# Patient Record
Sex: FEMALE | Race: WHITE | Employment: OTHER | URBAN - METROPOLITAN AREA
[De-identification: names, ages, dates, MRNs, and addresses within clinical notes are randomized per-mention and may not be internally consistent; named-entity substitution may affect disease eponyms.]

---

## 2017-11-15 LAB
AVERAGE GLUCOSE: NORMAL
HBA1C MFR BLD: 5.3 %

## 2018-04-25 ENCOUNTER — ANESTHESIA EVENT (OUTPATIENT)
Dept: OPERATING ROOM | Age: 83
DRG: 470 | End: 2018-04-25
Payer: MEDICARE

## 2018-04-25 ENCOUNTER — HOSPITAL ENCOUNTER (OUTPATIENT)
Dept: PREADMISSION TESTING | Age: 83
Discharge: HOME | End: 2018-04-25
Payer: MEDICARE

## 2018-04-25 ENCOUNTER — HOSPITAL ENCOUNTER (OUTPATIENT)
Dept: GENERAL RADIOLOGY | Age: 83
Discharge: HOME | End: 2018-04-27
Payer: MEDICARE

## 2018-04-25 VITALS
TEMPERATURE: 97.7 F | OXYGEN SATURATION: 97 % | HEIGHT: 63 IN | DIASTOLIC BLOOD PRESSURE: 75 MMHG | HEART RATE: 62 BPM | WEIGHT: 154 LBS | SYSTOLIC BLOOD PRESSURE: 177 MMHG | BODY MASS INDEX: 27.29 KG/M2 | RESPIRATION RATE: 16 BRPM

## 2018-04-25 DIAGNOSIS — M17.11 ARTHRITIS OF RIGHT KNEE: ICD-10-CM

## 2018-04-25 PROCEDURE — 71046 X-RAY EXAM CHEST 2 VIEWS: CPT

## 2018-04-25 RX ORDER — HYDROCODONE BITARTRATE AND ACETAMINOPHEN 5; 325 MG/1; MG/1
1 TABLET ORAL EVERY 8 HOURS PRN
Status: ON HOLD | COMMUNITY
End: 2018-05-03 | Stop reason: HOSPADM

## 2018-04-25 RX ORDER — PYRIDOXINE HCL (VITAMIN B6) 50 MG
50 TABLET ORAL DAILY
COMMUNITY
End: 2020-12-29

## 2018-04-25 RX ORDER — ROPIVACAINE HYDROCHLORIDE 5 MG/ML
30 INJECTION, SOLUTION EPIDURAL; INFILTRATION; PERINEURAL ONCE
Status: CANCELLED | OUTPATIENT
Start: 2018-04-25 | End: 2018-04-25

## 2018-04-25 RX ORDER — CELECOXIB 100 MG/1
200 CAPSULE ORAL ONCE
Status: CANCELLED | OUTPATIENT
Start: 2018-04-25 | End: 2018-04-25

## 2018-04-25 RX ORDER — LEVOTHYROXINE SODIUM 0.03 MG/1
25 TABLET ORAL DAILY
COMMUNITY
End: 2019-06-18 | Stop reason: SDUPTHER

## 2018-04-25 RX ORDER — FENTANYL CITRATE 50 UG/ML
50 INJECTION, SOLUTION INTRAMUSCULAR; INTRAVENOUS ONCE
Status: CANCELLED | OUTPATIENT
Start: 2018-04-25 | End: 2018-04-25

## 2018-04-25 RX ORDER — CHOLECALCIFEROL (VITAMIN D3) 125 MCG
500 CAPSULE ORAL DAILY
COMMUNITY
End: 2021-06-29 | Stop reason: ALTCHOICE

## 2018-04-25 RX ORDER — GABAPENTIN 100 MG/1
200 CAPSULE ORAL ONCE
Status: CANCELLED | OUTPATIENT
Start: 2018-04-25 | End: 2018-04-25

## 2018-04-25 RX ORDER — MIDAZOLAM HYDROCHLORIDE 1 MG/ML
1 INJECTION INTRAMUSCULAR; INTRAVENOUS EVERY 5 MIN PRN
Status: CANCELLED | OUTPATIENT
Start: 2018-04-25

## 2018-04-25 RX ORDER — ACETAMINOPHEN 500 MG
1000 TABLET ORAL ONCE
Status: CANCELLED | OUTPATIENT
Start: 2018-04-25 | End: 2018-04-25

## 2018-04-25 RX ORDER — OXYCODONE HYDROCHLORIDE 5 MG/1
5 TABLET ORAL ONCE
Status: CANCELLED | OUTPATIENT
Start: 2018-04-25 | End: 2018-04-25

## 2018-04-25 ASSESSMENT — PAIN DESCRIPTION - LOCATION: LOCATION: KNEE

## 2018-04-25 ASSESSMENT — PAIN DESCRIPTION - PAIN TYPE: TYPE: CHRONIC PAIN

## 2018-04-25 ASSESSMENT — KOOS JR
GOING UP OR DOWN STAIRS: 4
TWISING OR PIVOTING ON KNEE: 3
STANDING UPRIGHT: 3
BENDING TO THE FLOOR TO PICK UP OBJECT: 2
HOW SEVERE IS YOUR KNEE STIFFNESS AFTER FIRST WAKING IN MORNING: 2
STRAIGHTENING KNEE FULLY: 3
RISING FROM SITTING: 2

## 2018-04-25 ASSESSMENT — PAIN DESCRIPTION - ONSET: ONSET: ON-GOING

## 2018-04-25 ASSESSMENT — PAIN DESCRIPTION - DESCRIPTORS: DESCRIPTORS: DISCOMFORT

## 2018-04-25 ASSESSMENT — PAIN DESCRIPTION - ORIENTATION: ORIENTATION: RIGHT

## 2018-04-25 ASSESSMENT — PAIN DESCRIPTION - PROGRESSION: CLINICAL_PROGRESSION: GRADUALLY WORSENING

## 2018-04-25 ASSESSMENT — PAIN SCALES - GENERAL: PAINLEVEL_OUTOF10: 9

## 2018-04-25 ASSESSMENT — PAIN DESCRIPTION - FREQUENCY: FREQUENCY: CONTINUOUS

## 2018-05-01 ENCOUNTER — APPOINTMENT (OUTPATIENT)
Dept: GENERAL RADIOLOGY | Age: 83
DRG: 470 | End: 2018-05-01
Attending: ORTHOPAEDIC SURGERY
Payer: MEDICARE

## 2018-05-01 ENCOUNTER — HOSPITAL ENCOUNTER (INPATIENT)
Age: 83
LOS: 2 days | Discharge: NURSING, REHAB OR LONG-TERM CARE | DRG: 470 | End: 2018-05-03
Attending: ORTHOPAEDIC SURGERY | Admitting: ORTHOPAEDIC SURGERY
Payer: MEDICARE

## 2018-05-01 ENCOUNTER — ANESTHESIA (OUTPATIENT)
Dept: OPERATING ROOM | Age: 83
DRG: 470 | End: 2018-05-01
Payer: MEDICARE

## 2018-05-01 VITALS — SYSTOLIC BLOOD PRESSURE: 95 MMHG | DIASTOLIC BLOOD PRESSURE: 49 MMHG | TEMPERATURE: 93.7 F | OXYGEN SATURATION: 99 %

## 2018-05-01 DIAGNOSIS — M17.11 ARTHRITIS OF RIGHT KNEE: Primary | ICD-10-CM

## 2018-05-01 PROCEDURE — 6360000002 HC RX W HCPCS: Performed by: PHYSICIAN ASSISTANT

## 2018-05-01 PROCEDURE — 88311 DECALCIFY TISSUE: CPT

## 2018-05-01 PROCEDURE — 6370000000 HC RX 637 (ALT 250 FOR IP): Performed by: INTERNAL MEDICINE

## 2018-05-01 PROCEDURE — 7100000000 HC PACU RECOVERY - FIRST 15 MIN: Performed by: ORTHOPAEDIC SURGERY

## 2018-05-01 PROCEDURE — 2500000003 HC RX 250 WO HCPCS: Performed by: PHYSICIAN ASSISTANT

## 2018-05-01 PROCEDURE — 6370000000 HC RX 637 (ALT 250 FOR IP): Performed by: ANESTHESIOLOGY

## 2018-05-01 PROCEDURE — 3600000015 HC SURGERY LEVEL 5 ADDTL 15MIN: Performed by: ORTHOPAEDIC SURGERY

## 2018-05-01 PROCEDURE — 1200000000 HC SEMI PRIVATE

## 2018-05-01 PROCEDURE — 3600000005 HC SURGERY LEVEL 5 BASE: Performed by: ORTHOPAEDIC SURGERY

## 2018-05-01 PROCEDURE — 97165 OT EVAL LOW COMPLEX 30 MIN: CPT

## 2018-05-01 PROCEDURE — 73560 X-RAY EXAM OF KNEE 1 OR 2: CPT

## 2018-05-01 PROCEDURE — 97161 PT EVAL LOW COMPLEX 20 MIN: CPT

## 2018-05-01 PROCEDURE — G8987 SELF CARE CURRENT STATUS: HCPCS

## 2018-05-01 PROCEDURE — 3E0T3BZ INTRODUCTION OF ANESTHETIC AGENT INTO PERIPHERAL NERVES AND PLEXI, PERCUTANEOUS APPROACH: ICD-10-PCS | Performed by: ANESTHESIOLOGY

## 2018-05-01 PROCEDURE — 76942 ECHO GUIDE FOR BIOPSY: CPT | Performed by: ANESTHESIOLOGY

## 2018-05-01 PROCEDURE — 3700000001 HC ADD 15 MINUTES (ANESTHESIA): Performed by: ORTHOPAEDIC SURGERY

## 2018-05-01 PROCEDURE — G8988 SELF CARE GOAL STATUS: HCPCS

## 2018-05-01 PROCEDURE — 2500000003 HC RX 250 WO HCPCS: Performed by: NURSE ANESTHETIST, CERTIFIED REGISTERED

## 2018-05-01 PROCEDURE — C1713 ANCHOR/SCREW BN/BN,TIS/BN: HCPCS | Performed by: ORTHOPAEDIC SURGERY

## 2018-05-01 PROCEDURE — 6360000002 HC RX W HCPCS: Performed by: ORTHOPAEDIC SURGERY

## 2018-05-01 PROCEDURE — 2500000003 HC RX 250 WO HCPCS: Performed by: ORTHOPAEDIC SURGERY

## 2018-05-01 PROCEDURE — 0SRC0J9 REPLACEMENT OF RIGHT KNEE JOINT WITH SYNTHETIC SUBSTITUTE, CEMENTED, OPEN APPROACH: ICD-10-PCS | Performed by: ORTHOPAEDIC SURGERY

## 2018-05-01 PROCEDURE — 88305 TISSUE EXAM BY PATHOLOGIST: CPT

## 2018-05-01 PROCEDURE — 2500000003 HC RX 250 WO HCPCS: Performed by: ANESTHESIOLOGY

## 2018-05-01 PROCEDURE — 3700000000 HC ANESTHESIA ATTENDED CARE: Performed by: ORTHOPAEDIC SURGERY

## 2018-05-01 PROCEDURE — 6360000002 HC RX W HCPCS: Performed by: NURSE ANESTHETIST, CERTIFIED REGISTERED

## 2018-05-01 PROCEDURE — 2580000003 HC RX 258: Performed by: PHYSICIAN ASSISTANT

## 2018-05-01 PROCEDURE — 2720000010 HC SURG SUPPLY STERILE: Performed by: ORTHOPAEDIC SURGERY

## 2018-05-01 PROCEDURE — C1776 JOINT DEVICE (IMPLANTABLE): HCPCS | Performed by: ORTHOPAEDIC SURGERY

## 2018-05-01 PROCEDURE — 7100000001 HC PACU RECOVERY - ADDTL 15 MIN: Performed by: ORTHOPAEDIC SURGERY

## 2018-05-01 PROCEDURE — 2709999900 HC NON-CHARGEABLE SUPPLY: Performed by: ORTHOPAEDIC SURGERY

## 2018-05-01 PROCEDURE — 6360000002 HC RX W HCPCS: Performed by: ANESTHESIOLOGY

## 2018-05-01 PROCEDURE — 6370000000 HC RX 637 (ALT 250 FOR IP): Performed by: PHYSICIAN ASSISTANT

## 2018-05-01 PROCEDURE — 2580000003 HC RX 258: Performed by: NURSE ANESTHETIST, CERTIFIED REGISTERED

## 2018-05-01 DEVICE — BASEPLATE TIB SZ 3 KNEE TRITANIUM CEM PRI LO PROF TRIATHLON: Type: IMPLANTABLE DEVICE | Site: KNEE | Status: FUNCTIONAL

## 2018-05-01 DEVICE — COMPONENT PAT DIA29MM THK9MM SUP INFERIOR KNEE CONVENTIONAL: Type: IMPLANTABLE DEVICE | Site: KNEE | Status: FUNCTIONAL

## 2018-05-01 DEVICE — IMPLANTABLE DEVICE: Type: IMPLANTABLE DEVICE | Site: KNEE | Status: FUNCTIONAL

## 2018-05-01 DEVICE — CEMENT BNE 40 GM RADIOPAQUE BA SIMPLEX P: Type: IMPLANTABLE DEVICE | Status: FUNCTIONAL

## 2018-05-01 DEVICE — COMPONENT PATELLAR KNEE N2VAC: Type: IMPLANTABLE DEVICE | Site: KNEE | Status: FUNCTIONAL

## 2018-05-01 DEVICE — COMPONENT TOT KNEE CAPPED STD STRYKNEES] STRYKER CORP]: Type: IMPLANTABLE DEVICE | Site: KNEE | Status: FUNCTIONAL

## 2018-05-01 RX ORDER — VERAPAMIL HYDROCHLORIDE 240 MG/1
240 TABLET, FILM COATED, EXTENDED RELEASE ORAL DAILY
Status: DISCONTINUED | OUTPATIENT
Start: 2018-05-01 | End: 2018-05-03 | Stop reason: HOSPADM

## 2018-05-01 RX ORDER — ROPIVACAINE HYDROCHLORIDE 5 MG/ML
30 INJECTION, SOLUTION EPIDURAL; INFILTRATION; PERINEURAL ONCE
Status: COMPLETED | OUTPATIENT
Start: 2018-05-01 | End: 2018-05-01

## 2018-05-01 RX ORDER — LIDOCAINE HYDROCHLORIDE 10 MG/ML
INJECTION, SOLUTION INFILTRATION; PERINEURAL
Status: DISCONTINUED
Start: 2018-05-01 | End: 2018-05-01

## 2018-05-01 RX ORDER — SODIUM CHLORIDE 0.9 % (FLUSH) 0.9 %
10 SYRINGE (ML) INJECTION EVERY 12 HOURS SCHEDULED
Status: DISCONTINUED | OUTPATIENT
Start: 2018-05-01 | End: 2018-05-01 | Stop reason: HOSPADM

## 2018-05-01 RX ORDER — OXYCODONE HYDROCHLORIDE 5 MG/1
TABLET ORAL
Status: DISPENSED
Start: 2018-05-01 | End: 2018-05-01

## 2018-05-01 RX ORDER — PROPOFOL 10 MG/ML
INJECTION, EMULSION INTRAVENOUS CONTINUOUS PRN
Status: DISCONTINUED | OUTPATIENT
Start: 2018-05-01 | End: 2018-05-01 | Stop reason: SDUPTHER

## 2018-05-01 RX ORDER — CELECOXIB 100 MG/1
200 CAPSULE ORAL ONCE
Status: COMPLETED | OUTPATIENT
Start: 2018-05-01 | End: 2018-05-01

## 2018-05-01 RX ORDER — BUPIVACAINE HYDROCHLORIDE 7.5 MG/ML
INJECTION, SOLUTION INTRASPINAL PRN
Status: DISCONTINUED | OUTPATIENT
Start: 2018-05-01 | End: 2018-05-01 | Stop reason: SDUPTHER

## 2018-05-01 RX ORDER — OXYCODONE HYDROCHLORIDE 5 MG/1
5 TABLET ORAL ONCE
Status: COMPLETED | OUTPATIENT
Start: 2018-05-01 | End: 2018-05-01

## 2018-05-01 RX ORDER — SODIUM CHLORIDE 9 MG/ML
INJECTION, SOLUTION INTRAVENOUS CONTINUOUS
Status: DISCONTINUED | OUTPATIENT
Start: 2018-05-01 | End: 2018-05-01

## 2018-05-01 RX ORDER — ROPIVACAINE HYDROCHLORIDE 5 MG/ML
INJECTION, SOLUTION EPIDURAL; INFILTRATION; PERINEURAL
Status: DISCONTINUED
Start: 2018-05-01 | End: 2018-05-01

## 2018-05-01 RX ORDER — OXYCODONE HYDROCHLORIDE AND ACETAMINOPHEN 5; 325 MG/1; MG/1
1 TABLET ORAL EVERY 4 HOURS PRN
Status: DISCONTINUED | OUTPATIENT
Start: 2018-05-01 | End: 2018-05-02

## 2018-05-01 RX ORDER — FENTANYL CITRATE 50 UG/ML
50 INJECTION, SOLUTION INTRAMUSCULAR; INTRAVENOUS ONCE
Status: COMPLETED | OUTPATIENT
Start: 2018-05-01 | End: 2018-05-01

## 2018-05-01 RX ORDER — LEVOTHYROXINE SODIUM 0.05 MG/1
50 TABLET ORAL DAILY
Status: ON HOLD | COMMUNITY
Start: 2018-02-27 | End: 2018-05-01

## 2018-05-01 RX ORDER — ACETAMINOPHEN 500 MG
1000 TABLET ORAL ONCE
Status: COMPLETED | OUTPATIENT
Start: 2018-05-01 | End: 2018-05-01

## 2018-05-01 RX ORDER — GABAPENTIN 100 MG/1
CAPSULE ORAL
Status: DISCONTINUED
Start: 2018-05-01 | End: 2018-05-01

## 2018-05-01 RX ORDER — DOCUSATE SODIUM 100 MG/1
100 CAPSULE, LIQUID FILLED ORAL 2 TIMES DAILY
Status: DISCONTINUED | OUTPATIENT
Start: 2018-05-01 | End: 2018-05-03 | Stop reason: HOSPADM

## 2018-05-01 RX ORDER — ACETAMINOPHEN 325 MG/1
650 TABLET ORAL EVERY 4 HOURS PRN
Status: DISCONTINUED | OUTPATIENT
Start: 2018-05-01 | End: 2018-05-03 | Stop reason: HOSPADM

## 2018-05-01 RX ORDER — CELECOXIB 100 MG/1
CAPSULE ORAL
Status: DISCONTINUED
Start: 2018-05-01 | End: 2018-05-01

## 2018-05-01 RX ORDER — ATORVASTATIN CALCIUM 20 MG/1
20 TABLET, FILM COATED ORAL DAILY
Status: DISCONTINUED | OUTPATIENT
Start: 2018-05-01 | End: 2018-05-03 | Stop reason: HOSPADM

## 2018-05-01 RX ORDER — MIDAZOLAM HYDROCHLORIDE 1 MG/ML
INJECTION INTRAMUSCULAR; INTRAVENOUS
Status: DISCONTINUED
Start: 2018-05-01 | End: 2018-05-01

## 2018-05-01 RX ORDER — PROPOFOL 10 MG/ML
INJECTION, EMULSION INTRAVENOUS PRN
Status: DISCONTINUED | OUTPATIENT
Start: 2018-05-01 | End: 2018-05-01 | Stop reason: SDUPTHER

## 2018-05-01 RX ORDER — ONDANSETRON 2 MG/ML
4 INJECTION INTRAMUSCULAR; INTRAVENOUS
Status: DISCONTINUED | OUTPATIENT
Start: 2018-05-01 | End: 2018-05-01 | Stop reason: HOSPADM

## 2018-05-01 RX ORDER — BISACODYL 10 MG
10 SUPPOSITORY, RECTAL RECTAL DAILY PRN
Status: DISCONTINUED | OUTPATIENT
Start: 2018-05-01 | End: 2018-05-03 | Stop reason: HOSPADM

## 2018-05-01 RX ORDER — MIDAZOLAM HYDROCHLORIDE 1 MG/ML
1 INJECTION INTRAMUSCULAR; INTRAVENOUS EVERY 5 MIN PRN
Status: DISCONTINUED | OUTPATIENT
Start: 2018-05-01 | End: 2018-05-01 | Stop reason: HOSPADM

## 2018-05-01 RX ORDER — LIDOCAINE HYDROCHLORIDE 10 MG/ML
5 INJECTION, SOLUTION INFILTRATION; PERINEURAL ONCE
Status: COMPLETED | OUTPATIENT
Start: 2018-05-01 | End: 2018-05-01

## 2018-05-01 RX ORDER — TERAZOSIN 2 MG/1
2 CAPSULE ORAL DAILY
Status: ON HOLD | COMMUNITY
Start: 2018-04-12 | End: 2018-05-01

## 2018-05-01 RX ORDER — SODIUM CHLORIDE 0.9 % (FLUSH) 0.9 %
10 SYRINGE (ML) INJECTION EVERY 12 HOURS SCHEDULED
Status: DISCONTINUED | OUTPATIENT
Start: 2018-05-01 | End: 2018-05-03 | Stop reason: HOSPADM

## 2018-05-01 RX ORDER — FENTANYL CITRATE 50 UG/ML
INJECTION, SOLUTION INTRAMUSCULAR; INTRAVENOUS
Status: DISCONTINUED
Start: 2018-05-01 | End: 2018-05-01

## 2018-05-01 RX ORDER — DEXAMETHASONE SODIUM PHOSPHATE 10 MG/ML
INJECTION, SOLUTION INTRAMUSCULAR; INTRAVENOUS
Status: DISCONTINUED
Start: 2018-05-01 | End: 2018-05-01 | Stop reason: WASHOUT

## 2018-05-01 RX ORDER — PANTOPRAZOLE SODIUM 40 MG/1
40 TABLET, DELAYED RELEASE ORAL
Status: DISCONTINUED | OUTPATIENT
Start: 2018-05-02 | End: 2018-05-03 | Stop reason: HOSPADM

## 2018-05-01 RX ORDER — SODIUM CHLORIDE 0.9 % (FLUSH) 0.9 %
10 SYRINGE (ML) INJECTION PRN
Status: DISCONTINUED | OUTPATIENT
Start: 2018-05-01 | End: 2018-05-03 | Stop reason: HOSPADM

## 2018-05-01 RX ORDER — ONDANSETRON 2 MG/ML
4 INJECTION INTRAMUSCULAR; INTRAVENOUS EVERY 6 HOURS PRN
Status: DISCONTINUED | OUTPATIENT
Start: 2018-05-01 | End: 2018-05-03 | Stop reason: HOSPADM

## 2018-05-01 RX ORDER — GABAPENTIN 100 MG/1
200 CAPSULE ORAL ONCE
Status: COMPLETED | OUTPATIENT
Start: 2018-05-01 | End: 2018-05-01

## 2018-05-01 RX ORDER — FENTANYL CITRATE 50 UG/ML
25 INJECTION, SOLUTION INTRAMUSCULAR; INTRAVENOUS EVERY 5 MIN PRN
Status: DISCONTINUED | OUTPATIENT
Start: 2018-05-01 | End: 2018-05-01 | Stop reason: HOSPADM

## 2018-05-01 RX ORDER — DOXAZOSIN 2 MG/1
2 TABLET ORAL DAILY
Status: DISCONTINUED | OUTPATIENT
Start: 2018-05-01 | End: 2018-05-03 | Stop reason: HOSPADM

## 2018-05-01 RX ORDER — VERAPAMIL HYDROCHLORIDE 240 MG/1
240 CAPSULE, EXTENDED RELEASE ORAL DAILY
Status: ON HOLD | COMMUNITY
Start: 2018-04-12 | End: 2018-05-01

## 2018-05-01 RX ORDER — MORPHINE SULFATE 2 MG/ML
2 INJECTION, SOLUTION INTRAMUSCULAR; INTRAVENOUS
Status: DISCONTINUED | OUTPATIENT
Start: 2018-05-01 | End: 2018-05-03 | Stop reason: HOSPADM

## 2018-05-01 RX ORDER — FENTANYL CITRATE 50 UG/ML
50 INJECTION, SOLUTION INTRAMUSCULAR; INTRAVENOUS EVERY 5 MIN PRN
Status: DISCONTINUED | OUTPATIENT
Start: 2018-05-01 | End: 2018-05-01 | Stop reason: HOSPADM

## 2018-05-01 RX ORDER — SODIUM CHLORIDE, SODIUM LACTATE, POTASSIUM CHLORIDE, CALCIUM CHLORIDE 600; 310; 30; 20 MG/100ML; MG/100ML; MG/100ML; MG/100ML
INJECTION, SOLUTION INTRAVENOUS CONTINUOUS PRN
Status: DISCONTINUED | OUTPATIENT
Start: 2018-05-01 | End: 2018-05-01 | Stop reason: SDUPTHER

## 2018-05-01 RX ORDER — MORPHINE SULFATE 2 MG/ML
1 INJECTION, SOLUTION INTRAMUSCULAR; INTRAVENOUS
Status: DISCONTINUED | OUTPATIENT
Start: 2018-05-01 | End: 2018-05-03 | Stop reason: HOSPADM

## 2018-05-01 RX ORDER — LEVOTHYROXINE SODIUM 0.03 MG/1
25 TABLET ORAL DAILY
Status: DISCONTINUED | OUTPATIENT
Start: 2018-05-01 | End: 2018-05-03 | Stop reason: HOSPADM

## 2018-05-01 RX ORDER — SODIUM CHLORIDE 0.9 % (FLUSH) 0.9 %
10 SYRINGE (ML) INJECTION PRN
Status: DISCONTINUED | OUTPATIENT
Start: 2018-05-01 | End: 2018-05-01 | Stop reason: HOSPADM

## 2018-05-01 RX ORDER — ACETAMINOPHEN 500 MG
TABLET ORAL
Status: DISCONTINUED
Start: 2018-05-01 | End: 2018-05-01

## 2018-05-01 RX ORDER — SODIUM CHLORIDE, SODIUM LACTATE, POTASSIUM CHLORIDE, CALCIUM CHLORIDE 600; 310; 30; 20 MG/100ML; MG/100ML; MG/100ML; MG/100ML
INJECTION, SOLUTION INTRAVENOUS CONTINUOUS
Status: DISCONTINUED | OUTPATIENT
Start: 2018-05-01 | End: 2018-05-02

## 2018-05-01 RX ADMIN — OXYCODONE HYDROCHLORIDE AND ACETAMINOPHEN 1 TABLET: 5; 325 TABLET ORAL at 21:50

## 2018-05-01 RX ADMIN — SERTRALINE 50 MG: 50 TABLET, FILM COATED ORAL at 14:49

## 2018-05-01 RX ADMIN — PHENYLEPHRINE HYDROCHLORIDE 100 MCG: 10 INJECTION INTRAVENOUS at 09:02

## 2018-05-01 RX ADMIN — FENTANYL CITRATE 50 MCG: 50 INJECTION, SOLUTION INTRAMUSCULAR; INTRAVENOUS at 06:58

## 2018-05-01 RX ADMIN — PROPOFOL 80 MCG/KG/MIN: 10 INJECTION, EMULSION INTRAVENOUS at 08:15

## 2018-05-01 RX ADMIN — ONDANSETRON 4 MG: 2 INJECTION INTRAMUSCULAR; INTRAVENOUS at 12:29

## 2018-05-01 RX ADMIN — PROPOFOL 40 MG: 10 INJECTION, EMULSION INTRAVENOUS at 08:15

## 2018-05-01 RX ADMIN — LIDOCAINE HYDROCHLORIDE 5 ML: 10 INJECTION, SOLUTION INFILTRATION; PERINEURAL at 07:00

## 2018-05-01 RX ADMIN — TRANEXAMIC ACID 1000 MG: 100 INJECTION, SOLUTION INTRAVENOUS at 11:03

## 2018-05-01 RX ADMIN — SODIUM CHLORIDE, POTASSIUM CHLORIDE, SODIUM LACTATE AND CALCIUM CHLORIDE: 600; 310; 30; 20 INJECTION, SOLUTION INTRAVENOUS at 09:00

## 2018-05-01 RX ADMIN — SODIUM CHLORIDE: 9 INJECTION, SOLUTION INTRAVENOUS at 07:59

## 2018-05-01 RX ADMIN — ATORVASTATIN CALCIUM 20 MG: 20 TABLET, FILM COATED ORAL at 14:49

## 2018-05-01 RX ADMIN — CEFAZOLIN SODIUM 1 G: 1 SOLUTION INTRAVENOUS at 16:13

## 2018-05-01 RX ADMIN — VERAPAMIL HYDROCHLORIDE 240 MG: 240 TABLET, FILM COATED, EXTENDED RELEASE ORAL at 14:49

## 2018-05-01 RX ADMIN — ROPIVACAINE HYDROCHLORIDE 30 ML: 5 INJECTION, SOLUTION EPIDURAL; INFILTRATION; PERINEURAL at 07:08

## 2018-05-01 RX ADMIN — DOXAZOSIN 2 MG: 2 TABLET ORAL at 14:49

## 2018-05-01 RX ADMIN — DOCUSATE SODIUM 100 MG: 100 CAPSULE, LIQUID FILLED ORAL at 21:50

## 2018-05-01 RX ADMIN — GABAPENTIN 200 MG: 100 CAPSULE ORAL at 06:17

## 2018-05-01 RX ADMIN — MIDAZOLAM HYDROCHLORIDE 1 MG: 1 INJECTION, SOLUTION INTRAMUSCULAR; INTRAVENOUS at 06:58

## 2018-05-01 RX ADMIN — CEFAZOLIN SODIUM 2 G: 2 SOLUTION INTRAVENOUS at 07:59

## 2018-05-01 RX ADMIN — BUPIVACAINE HYDROCHLORIDE IN DEXTROSE 1.4 ML: 7.5 INJECTION, SOLUTION SUBARACHNOID at 08:11

## 2018-05-01 RX ADMIN — ACETAMINOPHEN 1000 MG: 500 TABLET ORAL at 06:16

## 2018-05-01 RX ADMIN — LEVOTHYROXINE SODIUM 25 MCG: 25 TABLET ORAL at 14:49

## 2018-05-01 RX ADMIN — OXYCODONE HYDROCHLORIDE 5 MG: 5 TABLET ORAL at 06:17

## 2018-05-01 RX ADMIN — CELECOXIB 200 MG: 100 CAPSULE ORAL at 06:17

## 2018-05-01 RX ADMIN — OXYCODONE HYDROCHLORIDE AND ACETAMINOPHEN 1 TABLET: 5; 325 TABLET ORAL at 16:29

## 2018-05-01 RX ADMIN — TRANEXAMIC ACID 1 G: 1 INJECTION, SOLUTION INTRAVENOUS at 08:03

## 2018-05-01 ASSESSMENT — PAIN DESCRIPTION - PAIN TYPE
TYPE: SURGICAL PAIN

## 2018-05-01 ASSESSMENT — PULMONARY FUNCTION TESTS
PIF_VALUE: 2
PIF_VALUE: 0
PIF_VALUE: 2
PIF_VALUE: 0
PIF_VALUE: 2
PIF_VALUE: 0
PIF_VALUE: 2
PIF_VALUE: 0
PIF_VALUE: 2
PIF_VALUE: 1
PIF_VALUE: 2
PIF_VALUE: 2
PIF_VALUE: 0
PIF_VALUE: 2
PIF_VALUE: 0
PIF_VALUE: 2
PIF_VALUE: 0
PIF_VALUE: 0
PIF_VALUE: 2
PIF_VALUE: 0
PIF_VALUE: 2
PIF_VALUE: 0
PIF_VALUE: 0
PIF_VALUE: 2
PIF_VALUE: 0
PIF_VALUE: 2
PIF_VALUE: 0
PIF_VALUE: 2
PIF_VALUE: 0
PIF_VALUE: 0
PIF_VALUE: 2
PIF_VALUE: 0
PIF_VALUE: 2
PIF_VALUE: 0
PIF_VALUE: 2
PIF_VALUE: 0
PIF_VALUE: 2
PIF_VALUE: 0
PIF_VALUE: 2
PIF_VALUE: 0
PIF_VALUE: 0
PIF_VALUE: 2
PIF_VALUE: 0
PIF_VALUE: 2
PIF_VALUE: 2
PIF_VALUE: 0
PIF_VALUE: 0
PIF_VALUE: 2
PIF_VALUE: 0
PIF_VALUE: 2

## 2018-05-01 ASSESSMENT — PAIN SCALES - GENERAL
PAINLEVEL_OUTOF10: 3
PAINLEVEL_OUTOF10: 0
PAINLEVEL_OUTOF10: 7
PAINLEVEL_OUTOF10: 5
PAINLEVEL_OUTOF10: 9
PAINLEVEL_OUTOF10: 6
PAINLEVEL_OUTOF10: 9
PAINLEVEL_OUTOF10: 0
PAINLEVEL_OUTOF10: 9
PAINLEVEL_OUTOF10: 7
PAINLEVEL_OUTOF10: 0
PAINLEVEL_OUTOF10: 9

## 2018-05-01 ASSESSMENT — PAIN - FUNCTIONAL ASSESSMENT: PAIN_FUNCTIONAL_ASSESSMENT: 0-10

## 2018-05-01 ASSESSMENT — PAIN DESCRIPTION - LOCATION: LOCATION: KNEE

## 2018-05-01 ASSESSMENT — PAIN DESCRIPTION - ORIENTATION: ORIENTATION: RIGHT

## 2018-05-01 ASSESSMENT — PAIN DESCRIPTION - FREQUENCY: FREQUENCY: CONTINUOUS

## 2018-05-01 ASSESSMENT — PAIN DESCRIPTION - DESCRIPTORS: DESCRIPTORS: DISCOMFORT

## 2018-05-02 PROBLEM — Z96.651 STATUS POST RIGHT KNEE REPLACEMENT: Status: ACTIVE | Noted: 2018-05-02

## 2018-05-02 LAB
ANION GAP SERPL CALCULATED.3IONS-SCNC: 12 MMOL/L (ref 7–16)
BASOPHILS ABSOLUTE: 0.01 E9/L (ref 0–0.2)
BASOPHILS RELATIVE PERCENT: 0.1 % (ref 0–2)
BUN BLDV-MCNC: 17 MG/DL (ref 8–23)
CALCIUM SERPL-MCNC: 8.5 MG/DL (ref 8.6–10.2)
CHLORIDE BLD-SCNC: 103 MMOL/L (ref 98–107)
CO2: 24 MMOL/L (ref 22–29)
CREAT SERPL-MCNC: 0.7 MG/DL (ref 0.5–1)
EOSINOPHILS ABSOLUTE: 0.01 E9/L (ref 0.05–0.5)
EOSINOPHILS RELATIVE PERCENT: 0.1 % (ref 0–6)
GFR AFRICAN AMERICAN: >60
GFR NON-AFRICAN AMERICAN: >60 ML/MIN/1.73
GLUCOSE BLD-MCNC: 150 MG/DL (ref 74–109)
HCT VFR BLD CALC: 35.7 % (ref 34–48)
HEMOGLOBIN: 11.9 G/DL (ref 11.5–15.5)
IMMATURE GRANULOCYTES #: 0.03 E9/L
IMMATURE GRANULOCYTES %: 0.3 % (ref 0–5)
LYMPHOCYTES ABSOLUTE: 0.57 E9/L (ref 1.5–4)
LYMPHOCYTES RELATIVE PERCENT: 5.5 % (ref 20–42)
MCH RBC QN AUTO: 32.8 PG (ref 26–35)
MCHC RBC AUTO-ENTMCNC: 33.3 % (ref 32–34.5)
MCV RBC AUTO: 98.3 FL (ref 80–99.9)
MONOCYTES ABSOLUTE: 1.12 E9/L (ref 0.1–0.95)
MONOCYTES RELATIVE PERCENT: 10.8 % (ref 2–12)
NEUTROPHILS ABSOLUTE: 8.65 E9/L (ref 1.8–7.3)
NEUTROPHILS RELATIVE PERCENT: 83.2 % (ref 43–80)
PDW BLD-RTO: 12.1 FL (ref 11.5–15)
PLATELET # BLD: 156 E9/L (ref 130–450)
PMV BLD AUTO: 11.9 FL (ref 7–12)
POTASSIUM REFLEX MAGNESIUM: 3.7 MMOL/L (ref 3.5–5)
RBC # BLD: 3.63 E12/L (ref 3.5–5.5)
RBC # BLD: NORMAL 10*6/UL
SODIUM BLD-SCNC: 139 MMOL/L (ref 132–146)
WBC # BLD: 10.4 E9/L (ref 4.5–11.5)

## 2018-05-02 PROCEDURE — 97530 THERAPEUTIC ACTIVITIES: CPT

## 2018-05-02 PROCEDURE — 97110 THERAPEUTIC EXERCISES: CPT

## 2018-05-02 PROCEDURE — 6370000000 HC RX 637 (ALT 250 FOR IP): Performed by: PHYSICIAN ASSISTANT

## 2018-05-02 PROCEDURE — 80048 BASIC METABOLIC PNL TOTAL CA: CPT

## 2018-05-02 PROCEDURE — 6370000000 HC RX 637 (ALT 250 FOR IP): Performed by: INTERNAL MEDICINE

## 2018-05-02 PROCEDURE — 36415 COLL VENOUS BLD VENIPUNCTURE: CPT

## 2018-05-02 PROCEDURE — 2580000003 HC RX 258: Performed by: PHYSICIAN ASSISTANT

## 2018-05-02 PROCEDURE — 6360000002 HC RX W HCPCS: Performed by: PHYSICIAN ASSISTANT

## 2018-05-02 PROCEDURE — 85025 COMPLETE CBC W/AUTO DIFF WBC: CPT

## 2018-05-02 PROCEDURE — 97535 SELF CARE MNGMENT TRAINING: CPT

## 2018-05-02 PROCEDURE — 1200000000 HC SEMI PRIVATE

## 2018-05-02 RX ORDER — OXYCODONE HYDROCHLORIDE AND ACETAMINOPHEN 5; 325 MG/1; MG/1
2 TABLET ORAL EVERY 4 HOURS PRN
Status: DISCONTINUED | OUTPATIENT
Start: 2018-05-02 | End: 2018-05-03 | Stop reason: HOSPADM

## 2018-05-02 RX ORDER — LISINOPRIL 10 MG/1
10 TABLET ORAL DAILY
Status: DISCONTINUED | OUTPATIENT
Start: 2018-05-02 | End: 2018-05-03 | Stop reason: HOSPADM

## 2018-05-02 RX ADMIN — PANTOPRAZOLE SODIUM 40 MG: 40 TABLET, DELAYED RELEASE ORAL at 06:02

## 2018-05-02 RX ADMIN — MORPHINE SULFATE 2 MG: 2 INJECTION, SOLUTION INTRAMUSCULAR; INTRAVENOUS at 06:02

## 2018-05-02 RX ADMIN — DOCUSATE SODIUM 100 MG: 100 CAPSULE, LIQUID FILLED ORAL at 08:05

## 2018-05-02 RX ADMIN — DOCUSATE SODIUM 100 MG: 100 CAPSULE, LIQUID FILLED ORAL at 20:46

## 2018-05-02 RX ADMIN — ATORVASTATIN CALCIUM 20 MG: 20 TABLET, FILM COATED ORAL at 08:04

## 2018-05-02 RX ADMIN — OXYCODONE HYDROCHLORIDE AND ACETAMINOPHEN 2 TABLET: 5; 325 TABLET ORAL at 17:11

## 2018-05-02 RX ADMIN — SERTRALINE 50 MG: 50 TABLET, FILM COATED ORAL at 08:05

## 2018-05-02 RX ADMIN — SODIUM CHLORIDE, POTASSIUM CHLORIDE, SODIUM LACTATE AND CALCIUM CHLORIDE: 600; 310; 30; 20 INJECTION, SOLUTION INTRAVENOUS at 03:23

## 2018-05-02 RX ADMIN — DOXAZOSIN 2 MG: 2 TABLET ORAL at 08:05

## 2018-05-02 RX ADMIN — CEFAZOLIN SODIUM 1 G: 1 SOLUTION INTRAVENOUS at 08:05

## 2018-05-02 RX ADMIN — OXYCODONE HYDROCHLORIDE AND ACETAMINOPHEN 1 TABLET: 5; 325 TABLET ORAL at 03:20

## 2018-05-02 RX ADMIN — ASPIRIN 325 MG: 325 TABLET, DELAYED RELEASE ORAL at 20:47

## 2018-05-02 RX ADMIN — MORPHINE SULFATE 2 MG: 2 INJECTION, SOLUTION INTRAMUSCULAR; INTRAVENOUS at 00:21

## 2018-05-02 RX ADMIN — LEVOTHYROXINE SODIUM 25 MCG: 25 TABLET ORAL at 06:02

## 2018-05-02 RX ADMIN — Medication 10 ML: at 06:03

## 2018-05-02 RX ADMIN — OXYCODONE HYDROCHLORIDE AND ACETAMINOPHEN 2 TABLET: 5; 325 TABLET ORAL at 08:04

## 2018-05-02 RX ADMIN — Medication 10 ML: at 20:46

## 2018-05-02 RX ADMIN — Medication 10 ML: at 00:22

## 2018-05-02 RX ADMIN — OXYCODONE HYDROCHLORIDE AND ACETAMINOPHEN 2 TABLET: 5; 325 TABLET ORAL at 12:39

## 2018-05-02 RX ADMIN — ASPIRIN 325 MG: 325 TABLET, DELAYED RELEASE ORAL at 08:04

## 2018-05-02 RX ADMIN — OXYCODONE HYDROCHLORIDE AND ACETAMINOPHEN 2 TABLET: 5; 325 TABLET ORAL at 21:36

## 2018-05-02 RX ADMIN — CEFAZOLIN SODIUM 1 G: 1 SOLUTION INTRAVENOUS at 00:35

## 2018-05-02 RX ADMIN — VERAPAMIL HYDROCHLORIDE 240 MG: 240 TABLET, FILM COATED, EXTENDED RELEASE ORAL at 08:05

## 2018-05-02 RX ADMIN — LISINOPRIL 10 MG: 10 TABLET ORAL at 13:49

## 2018-05-02 ASSESSMENT — PAIN DESCRIPTION - LOCATION
LOCATION: KNEE

## 2018-05-02 ASSESSMENT — PAIN DESCRIPTION - PAIN TYPE
TYPE: SURGICAL PAIN

## 2018-05-02 ASSESSMENT — PAIN DESCRIPTION - ORIENTATION
ORIENTATION: RIGHT

## 2018-05-02 ASSESSMENT — PAIN SCALES - GENERAL
PAINLEVEL_OUTOF10: 8
PAINLEVEL_OUTOF10: 8
PAINLEVEL_OUTOF10: 7
PAINLEVEL_OUTOF10: 6
PAINLEVEL_OUTOF10: 7
PAINLEVEL_OUTOF10: 6
PAINLEVEL_OUTOF10: 6
PAINLEVEL_OUTOF10: 9
PAINLEVEL_OUTOF10: 7
PAINLEVEL_OUTOF10: 0
PAINLEVEL_OUTOF10: 8
PAINLEVEL_OUTOF10: 8
PAINLEVEL_OUTOF10: 7
PAINLEVEL_OUTOF10: 10

## 2018-05-02 ASSESSMENT — PAIN DESCRIPTION - FREQUENCY
FREQUENCY: CONTINUOUS

## 2018-05-02 ASSESSMENT — PAIN DESCRIPTION - ONSET
ONSET: ON-GOING

## 2018-05-02 ASSESSMENT — PAIN DESCRIPTION - DESCRIPTORS
DESCRIPTORS: ACHING;CONSTANT;DISCOMFORT
DESCRIPTORS: ACHING;CONSTANT;DISCOMFORT
DESCRIPTORS: ACHING;DISCOMFORT
DESCRIPTORS: ACHING;CONSTANT;DISCOMFORT
DESCRIPTORS: ACHING;CONSTANT;DISCOMFORT
DESCRIPTORS: DISCOMFORT
DESCRIPTORS: DISCOMFORT

## 2018-05-03 VITALS
OXYGEN SATURATION: 95 % | BODY MASS INDEX: 26.58 KG/M2 | HEART RATE: 75 BPM | SYSTOLIC BLOOD PRESSURE: 110 MMHG | WEIGHT: 150 LBS | TEMPERATURE: 98.5 F | DIASTOLIC BLOOD PRESSURE: 55 MMHG | RESPIRATION RATE: 16 BRPM | HEIGHT: 63 IN

## 2018-05-03 LAB
ANION GAP SERPL CALCULATED.3IONS-SCNC: 9 MMOL/L (ref 7–16)
BASOPHILS ABSOLUTE: 0.01 E9/L (ref 0–0.2)
BASOPHILS RELATIVE PERCENT: 0.1 % (ref 0–2)
BUN BLDV-MCNC: 19 MG/DL (ref 8–23)
CALCIUM SERPL-MCNC: 8.5 MG/DL (ref 8.6–10.2)
CHLORIDE BLD-SCNC: 100 MMOL/L (ref 98–107)
CO2: 26 MMOL/L (ref 22–29)
CREAT SERPL-MCNC: 0.8 MG/DL (ref 0.5–1)
EOSINOPHILS ABSOLUTE: 0.08 E9/L (ref 0.05–0.5)
EOSINOPHILS RELATIVE PERCENT: 0.9 % (ref 0–6)
GFR AFRICAN AMERICAN: >60
GFR NON-AFRICAN AMERICAN: >60 ML/MIN/1.73
GLUCOSE BLD-MCNC: 116 MG/DL (ref 74–109)
HCT VFR BLD CALC: 32.7 % (ref 34–48)
HEMOGLOBIN: 10.8 G/DL (ref 11.5–15.5)
IMMATURE GRANULOCYTES #: 0.05 E9/L
IMMATURE GRANULOCYTES %: 0.6 % (ref 0–5)
LYMPHOCYTES ABSOLUTE: 1.06 E9/L (ref 1.5–4)
LYMPHOCYTES RELATIVE PERCENT: 12.5 % (ref 20–42)
MCH RBC QN AUTO: 32.6 PG (ref 26–35)
MCHC RBC AUTO-ENTMCNC: 33 % (ref 32–34.5)
MCV RBC AUTO: 98.8 FL (ref 80–99.9)
MONOCYTES ABSOLUTE: 1.1 E9/L (ref 0.1–0.95)
MONOCYTES RELATIVE PERCENT: 13 % (ref 2–12)
NEUTROPHILS ABSOLUTE: 6.16 E9/L (ref 1.8–7.3)
NEUTROPHILS RELATIVE PERCENT: 72.9 % (ref 43–80)
PDW BLD-RTO: 12.3 FL (ref 11.5–15)
PLATELET # BLD: 145 E9/L (ref 130–450)
PMV BLD AUTO: 11.9 FL (ref 7–12)
POTASSIUM REFLEX MAGNESIUM: 3.9 MMOL/L (ref 3.5–5)
RBC # BLD: 3.31 E12/L (ref 3.5–5.5)
SODIUM BLD-SCNC: 135 MMOL/L (ref 132–146)
WBC # BLD: 8.5 E9/L (ref 4.5–11.5)

## 2018-05-03 PROCEDURE — 36415 COLL VENOUS BLD VENIPUNCTURE: CPT

## 2018-05-03 PROCEDURE — 6370000000 HC RX 637 (ALT 250 FOR IP): Performed by: PHYSICIAN ASSISTANT

## 2018-05-03 PROCEDURE — 2580000003 HC RX 258: Performed by: PHYSICIAN ASSISTANT

## 2018-05-03 PROCEDURE — 85025 COMPLETE CBC W/AUTO DIFF WBC: CPT

## 2018-05-03 PROCEDURE — 97110 THERAPEUTIC EXERCISES: CPT

## 2018-05-03 PROCEDURE — 6370000000 HC RX 637 (ALT 250 FOR IP): Performed by: INTERNAL MEDICINE

## 2018-05-03 PROCEDURE — 97530 THERAPEUTIC ACTIVITIES: CPT

## 2018-05-03 PROCEDURE — 80048 BASIC METABOLIC PNL TOTAL CA: CPT

## 2018-05-03 RX ORDER — LISINOPRIL 10 MG/1
10 TABLET ORAL DAILY
Qty: 30 TABLET | Refills: 3 | Status: SHIPPED | OUTPATIENT
Start: 2018-05-04 | End: 2019-06-18 | Stop reason: SDUPTHER

## 2018-05-03 RX ORDER — OXYCODONE HYDROCHLORIDE AND ACETAMINOPHEN 5; 325 MG/1; MG/1
1-2 TABLET ORAL EVERY 4 HOURS PRN
Qty: 50 TABLET | Refills: 0 | Status: SHIPPED | OUTPATIENT
Start: 2018-05-03 | End: 2018-05-10

## 2018-05-03 RX ORDER — PSEUDOEPHEDRINE HCL 30 MG
100 TABLET ORAL 2 TIMES DAILY
Qty: 20 CAPSULE | Refills: 0 | Status: SHIPPED | OUTPATIENT
Start: 2018-05-03 | End: 2018-05-13

## 2018-05-03 RX ADMIN — Medication 10 ML: at 08:37

## 2018-05-03 RX ADMIN — DOXAZOSIN 2 MG: 2 TABLET ORAL at 08:38

## 2018-05-03 RX ADMIN — VERAPAMIL HYDROCHLORIDE 240 MG: 240 TABLET, FILM COATED, EXTENDED RELEASE ORAL at 08:38

## 2018-05-03 RX ADMIN — SERTRALINE 50 MG: 50 TABLET, FILM COATED ORAL at 08:37

## 2018-05-03 RX ADMIN — LISINOPRIL 10 MG: 10 TABLET ORAL at 08:37

## 2018-05-03 RX ADMIN — ATORVASTATIN CALCIUM 20 MG: 20 TABLET, FILM COATED ORAL at 08:37

## 2018-05-03 RX ADMIN — DOCUSATE SODIUM 100 MG: 100 CAPSULE, LIQUID FILLED ORAL at 08:37

## 2018-05-03 RX ADMIN — OXYCODONE HYDROCHLORIDE AND ACETAMINOPHEN 2 TABLET: 5; 325 TABLET ORAL at 06:28

## 2018-05-03 RX ADMIN — OXYCODONE HYDROCHLORIDE AND ACETAMINOPHEN 2 TABLET: 5; 325 TABLET ORAL at 14:19

## 2018-05-03 RX ADMIN — PANTOPRAZOLE SODIUM 40 MG: 40 TABLET, DELAYED RELEASE ORAL at 06:28

## 2018-05-03 RX ADMIN — ASPIRIN 325 MG: 325 TABLET, DELAYED RELEASE ORAL at 08:37

## 2018-05-03 RX ADMIN — LEVOTHYROXINE SODIUM 25 MCG: 25 TABLET ORAL at 06:28

## 2018-05-03 ASSESSMENT — PAIN DESCRIPTION - PROGRESSION: CLINICAL_PROGRESSION: GRADUALLY WORSENING

## 2018-05-03 ASSESSMENT — PAIN DESCRIPTION - LOCATION
LOCATION: KNEE

## 2018-05-03 ASSESSMENT — PAIN DESCRIPTION - DESCRIPTORS
DESCRIPTORS: ACHING;DISCOMFORT;CONSTANT
DESCRIPTORS: ACHING;DISCOMFORT

## 2018-05-03 ASSESSMENT — PAIN SCALES - GENERAL
PAINLEVEL_OUTOF10: 8
PAINLEVEL_OUTOF10: 6
PAINLEVEL_OUTOF10: 0
PAINLEVEL_OUTOF10: 4

## 2018-05-03 ASSESSMENT — PAIN DESCRIPTION - ONSET: ONSET: ON-GOING

## 2018-05-03 ASSESSMENT — PAIN DESCRIPTION - PAIN TYPE
TYPE: SURGICAL PAIN

## 2018-05-03 ASSESSMENT — PAIN DESCRIPTION - FREQUENCY: FREQUENCY: CONTINUOUS

## 2018-05-03 ASSESSMENT — PAIN DESCRIPTION - ORIENTATION
ORIENTATION: RIGHT

## 2018-05-04 ENCOUNTER — HOSPITAL ENCOUNTER (OUTPATIENT)
Age: 83
Discharge: HOME | End: 2018-05-06
Payer: MEDICARE

## 2018-05-04 LAB
ANION GAP SERPL CALCULATED.3IONS-SCNC: 12 MMOL/L (ref 7–16)
BUN BLDV-MCNC: 20 MG/DL (ref 8–23)
CALCIUM SERPL-MCNC: 8.5 MG/DL (ref 8.6–10.2)
CHLORIDE BLD-SCNC: 97 MMOL/L (ref 98–107)
CO2: 27 MMOL/L (ref 22–29)
CREAT SERPL-MCNC: 0.8 MG/DL (ref 0.5–1)
GFR AFRICAN AMERICAN: >60
GFR NON-AFRICAN AMERICAN: >60 ML/MIN/1.73
GLUCOSE BLD-MCNC: 99 MG/DL (ref 74–109)
HCT VFR BLD CALC: 31.3 % (ref 34–48)
HEMOGLOBIN: 10 G/DL (ref 11.5–15.5)
MCH RBC QN AUTO: 31.9 PG (ref 26–35)
MCHC RBC AUTO-ENTMCNC: 31.9 % (ref 32–34.5)
MCV RBC AUTO: 100 FL (ref 80–99.9)
PDW BLD-RTO: 12.7 FL (ref 11.5–15)
PLATELET # BLD: 127 E9/L (ref 130–450)
PMV BLD AUTO: 12.5 FL (ref 7–12)
POTASSIUM SERPL-SCNC: 3.8 MMOL/L (ref 3.5–5)
RBC # BLD: 3.13 E12/L (ref 3.5–5.5)
SODIUM BLD-SCNC: 136 MMOL/L (ref 132–146)
WBC # BLD: 7.7 E9/L (ref 4.5–11.5)

## 2018-05-04 PROCEDURE — 85027 COMPLETE CBC AUTOMATED: CPT

## 2018-05-04 PROCEDURE — 36415 COLL VENOUS BLD VENIPUNCTURE: CPT

## 2018-05-04 PROCEDURE — 80048 BASIC METABOLIC PNL TOTAL CA: CPT

## 2018-10-05 LAB
CHOLESTEROL, TOTAL: 177 MG/DL
CHOLESTEROL/HDL RATIO: 2.1
CREATININE: 0.8 MG/DL
HDLC SERPL-MCNC: 84 MG/DL (ref 35–70)
LDL CHOLESTEROL CALCULATED: 77 MG/DL (ref 0–160)
POTASSIUM (K+): 4
TRIGL SERPL-MCNC: 77 MG/DL
VLDLC SERPL CALC-MCNC: ABNORMAL MG/DL

## 2019-04-01 ENCOUNTER — HOSPITAL ENCOUNTER (OUTPATIENT)
Dept: MAMMOGRAPHY | Age: 84
Discharge: HOME | End: 2019-04-03
Payer: MEDICARE

## 2019-04-01 DIAGNOSIS — Z12.31 VISIT FOR SCREENING MAMMOGRAM: ICD-10-CM

## 2019-04-01 PROCEDURE — 77063 BREAST TOMOSYNTHESIS BI: CPT

## 2019-04-26 VITALS
OXYGEN SATURATION: 98 % | HEIGHT: 63 IN | WEIGHT: 160.38 LBS | TEMPERATURE: 97.3 F | BODY MASS INDEX: 28.42 KG/M2 | SYSTOLIC BLOOD PRESSURE: 148 MMHG | DIASTOLIC BLOOD PRESSURE: 86 MMHG | HEART RATE: 85 BPM

## 2019-05-07 ENCOUNTER — HOSPITAL ENCOUNTER (OUTPATIENT)
Age: 84
Discharge: HOME | End: 2019-05-09
Payer: MEDICARE

## 2019-05-07 LAB
ALBUMIN SERPL-MCNC: 4.2 G/DL (ref 3.5–5.2)
ALP BLD-CCNC: 89 U/L (ref 35–104)
ALT SERPL-CCNC: 17 U/L (ref 0–32)
ANION GAP SERPL CALCULATED.3IONS-SCNC: 12 MMOL/L (ref 7–16)
AST SERPL-CCNC: 24 U/L (ref 0–31)
BASOPHILS ABSOLUTE: 0.04 E9/L (ref 0–0.2)
BASOPHILS RELATIVE PERCENT: 0.9 % (ref 0–2)
BILIRUB SERPL-MCNC: 0.7 MG/DL (ref 0–1.2)
BUN BLDV-MCNC: 22 MG/DL (ref 8–23)
CALCIUM SERPL-MCNC: 9.1 MG/DL (ref 8.6–10.2)
CHLORIDE BLD-SCNC: 104 MMOL/L (ref 98–107)
CHOLESTEROL, FASTING: 162 MG/DL (ref 0–199)
CO2: 26 MMOL/L (ref 22–29)
CREAT SERPL-MCNC: 0.8 MG/DL (ref 0.5–1)
EOSINOPHILS ABSOLUTE: 0.15 E9/L (ref 0.05–0.5)
EOSINOPHILS RELATIVE PERCENT: 3.4 % (ref 0–6)
GFR AFRICAN AMERICAN: >60
GFR NON-AFRICAN AMERICAN: >60 ML/MIN/1.73
GLUCOSE BLD-MCNC: 98 MG/DL (ref 74–99)
HBA1C MFR BLD: 5.2 % (ref 4–5.6)
HCT VFR BLD CALC: 41.9 % (ref 34–48)
HDLC SERPL-MCNC: 73 MG/DL
HEMOGLOBIN: 13.5 G/DL (ref 11.5–15.5)
IMMATURE GRANULOCYTES #: 0.01 E9/L
IMMATURE GRANULOCYTES %: 0.2 % (ref 0–5)
LDL CHOLESTEROL CALCULATED: 75 MG/DL (ref 0–99)
LYMPHOCYTES ABSOLUTE: 1.2 E9/L (ref 1.5–4)
LYMPHOCYTES RELATIVE PERCENT: 27.6 % (ref 20–42)
MAGNESIUM: 2.2 MG/DL (ref 1.6–2.6)
MCH RBC QN AUTO: 32.1 PG (ref 26–35)
MCHC RBC AUTO-ENTMCNC: 32.2 % (ref 32–34.5)
MCV RBC AUTO: 99.5 FL (ref 80–99.9)
MONOCYTES ABSOLUTE: 0.37 E9/L (ref 0.1–0.95)
MONOCYTES RELATIVE PERCENT: 8.5 % (ref 2–12)
NEUTROPHILS ABSOLUTE: 2.58 E9/L (ref 1.8–7.3)
NEUTROPHILS RELATIVE PERCENT: 59.4 % (ref 43–80)
PDW BLD-RTO: 12.5 FL (ref 11.5–15)
PLATELET # BLD: 172 E9/L (ref 130–450)
PMV BLD AUTO: 12.4 FL (ref 7–12)
POTASSIUM SERPL-SCNC: 4.5 MMOL/L (ref 3.5–5)
RBC # BLD: 4.21 E12/L (ref 3.5–5.5)
SODIUM BLD-SCNC: 142 MMOL/L (ref 132–146)
T4 FREE: 1.17 NG/DL (ref 0.93–1.7)
TOTAL PROTEIN: 6.4 G/DL (ref 6.4–8.3)
TRIGLYCERIDE, FASTING: 68 MG/DL (ref 0–149)
TSH SERPL DL<=0.05 MIU/L-ACNC: 4.89 UIU/ML (ref 0.27–4.2)
VLDLC SERPL CALC-MCNC: 14 MG/DL
WBC # BLD: 4.4 E9/L (ref 4.5–11.5)

## 2019-05-07 PROCEDURE — 83036 HEMOGLOBIN GLYCOSYLATED A1C: CPT

## 2019-05-07 PROCEDURE — 85025 COMPLETE CBC W/AUTO DIFF WBC: CPT

## 2019-05-07 PROCEDURE — 80053 COMPREHEN METABOLIC PANEL: CPT

## 2019-05-07 PROCEDURE — 83735 ASSAY OF MAGNESIUM: CPT

## 2019-05-07 PROCEDURE — 36415 COLL VENOUS BLD VENIPUNCTURE: CPT

## 2019-05-07 PROCEDURE — 84443 ASSAY THYROID STIM HORMONE: CPT

## 2019-05-07 PROCEDURE — 84439 ASSAY OF FREE THYROXINE: CPT

## 2019-05-07 PROCEDURE — 80061 LIPID PANEL: CPT

## 2019-05-08 ENCOUNTER — TELEPHONE (OUTPATIENT)
Dept: FAMILY MEDICINE CLINIC | Age: 84
End: 2019-05-08

## 2019-05-08 NOTE — TELEPHONE ENCOUNTER
Pt notiifed   ----- Message from Iris Johnson MD sent at 5/8/2019  7:40 AM EDT -----  Please let the patient know that lab work showed:     A1c for 3 month sugar control was normal, No prediabetes or diabetes. Continue to watch diet from carb's and sugars. Cholesterol was good, continue to watch diet and current medication     thyroid labs suggest slight underactive, may need to consider increase in medication, would recheck in 3 months and if more underactive would increase dose. White blood cell count was mildly decreased. Uncertain cause. Would follow on next lab work     Other electrolytes, liver, kidney and blood counts were ok. Please send a copy of reports to patient. Thanks!

## 2019-05-14 ENCOUNTER — OFFICE VISIT (OUTPATIENT)
Dept: FAMILY MEDICINE CLINIC | Age: 84
End: 2019-05-14
Payer: MEDICARE

## 2019-05-14 VITALS
SYSTOLIC BLOOD PRESSURE: 148 MMHG | OXYGEN SATURATION: 96 % | BODY MASS INDEX: 27.66 KG/M2 | WEIGHT: 162 LBS | TEMPERATURE: 97.7 F | HEART RATE: 78 BPM | HEIGHT: 64 IN | DIASTOLIC BLOOD PRESSURE: 86 MMHG

## 2019-05-14 DIAGNOSIS — E55.9 VITAMIN D DEFICIENCY: ICD-10-CM

## 2019-05-14 DIAGNOSIS — N39.3 STRESS INCONTINENCE OF URINE: ICD-10-CM

## 2019-05-14 DIAGNOSIS — I10 ESSENTIAL HYPERTENSION: Primary | Chronic | ICD-10-CM

## 2019-05-14 DIAGNOSIS — E78.2 MIXED HYPERLIPIDEMIA: Chronic | ICD-10-CM

## 2019-05-14 DIAGNOSIS — Z79.899 LONG TERM CURRENT USE OF THERAPEUTIC DRUG: ICD-10-CM

## 2019-05-14 DIAGNOSIS — M15.9 GENERALIZED OSTEOARTHRITIS: ICD-10-CM

## 2019-05-14 DIAGNOSIS — E89.0 POSTOPERATIVE HYPOTHYROIDISM: ICD-10-CM

## 2019-05-14 DIAGNOSIS — R73.01 IMPAIRED FASTING GLUCOSE: ICD-10-CM

## 2019-05-14 DIAGNOSIS — Z96.651 PRESENCE OF RIGHT ARTIFICIAL KNEE JOINT: ICD-10-CM

## 2019-05-14 DIAGNOSIS — D34 HURTHLE CELL TUMOR: ICD-10-CM

## 2019-05-14 DIAGNOSIS — F32.0 CURRENT MILD EPISODE OF MAJOR DEPRESSIVE DISORDER, UNSPECIFIED WHETHER RECURRENT (HCC): Chronic | ICD-10-CM

## 2019-05-14 DIAGNOSIS — K21.9 GASTROESOPHAGEAL REFLUX DISEASE WITHOUT ESOPHAGITIS: Chronic | ICD-10-CM

## 2019-05-14 PROCEDURE — 99214 OFFICE O/P EST MOD 30 MIN: CPT | Performed by: INTERNAL MEDICINE

## 2019-05-14 RX ORDER — HYDROCODONE BITARTRATE AND ACETAMINOPHEN 5; 325 MG/1; MG/1
1 TABLET ORAL DAILY
COMMUNITY
Start: 2019-04-05 | End: 2019-05-14 | Stop reason: SDUPTHER

## 2019-05-14 RX ORDER — HYDROCODONE BITARTRATE AND ACETAMINOPHEN 5; 325 MG/1; MG/1
1 TABLET ORAL EVERY 8 HOURS PRN
Qty: 45 TABLET | Refills: 0 | Status: SHIPPED | OUTPATIENT
Start: 2019-05-14 | End: 2019-06-13

## 2019-05-14 RX ORDER — TERAZOSIN 1 MG/1
2 CAPSULE ORAL DAILY
Qty: 90 CAPSULE | Refills: 0 | Status: SHIPPED | OUTPATIENT
Start: 2019-05-14 | End: 2019-06-18

## 2019-05-14 ASSESSMENT — ENCOUNTER SYMPTOMS
EYE PAIN: 0
DIARRHEA: 0
VOMITING: 0
SHORTNESS OF BREATH: 0
NAUSEA: 0
ABDOMINAL PAIN: 0
COUGH: 0
CHEST TIGHTNESS: 0
RHINORRHEA: 0
CONSTIPATION: 0
BLOOD IN STOOL: 0
SORE THROAT: 0

## 2019-05-14 NOTE — PROGRESS NOTES
[facility]) Physicians   Internal Medicine     2019  Lilibeth Thakkar : 1933 Sex: female  Age:85 y.o. Chief Complaint   Patient presents with    Hypertension     elevated        HPI:   Patient presents to office for review and management of chronic medical conditions.    - States having some back discomfort. Unchanged. No numbness, tingling, weakness. No fever or chills. States lower  back bilateral. Declines work up. - States has GERD, on omeprazole - Symptoms have returned with decreased in omeprazole dose from  40mg. Improved with increase.    - States has had right total knee arthoplasty for arthritis. Still having right Knee pain. States also has  some back pain. States following with ortho (Dr. Rupert Pierson). States had compression stockings. States also  using heating pad to back. States needs refill of meds. Takes Norco mostly at night - helping.    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 No side effects reported. Administration instructions reviewed. Last Lab (2019) - borederline underactive     - States has high blood pressure, does check blood pressure at home occasionally at home. range  140-150's. On verapamil and lisinopril and terazosin. - States has high cholesterol, try's to watch diet, on cholesterol meds - atorvastatin. - States depression symptoms are stable. On Zoloft. Medication has helped. No side effects. States  stable. States  season is a difficult time of year. Decline counselling.    - States has urinary incontinence - stable with meds (terazosin).     Health Maintenance   - immunizations:   Influenza Vaccination - (2016) , (10/2017), (10/2018) - RA  Pneumonia Vaccination - (2015) - prevnar, (2016) - pneumonoccal  Zoster/Shingles Vaccine  Tetanus Vaccination - (12/15/2015)    - Screenings:   Bone Density Scan - (2019)   Pelvic/Pap Exam  Mammogram - (2014), (2019) - neg Colonoscopy - (2011) - neg per patient    Couseled on Home Safety - (11/28/2017)    Carotid Artery Study - (3/2016) - <50% b/l, also showed thyroid nodules    ROS:  Review of Systems   Constitutional: Negative for appetite change, chills, fever and unexpected weight change. HENT: Negative for congestion, rhinorrhea and sore throat. Eyes: Negative for pain and visual disturbance. Respiratory: Negative for cough, chest tightness and shortness of breath. Cardiovascular: Negative for chest pain and palpitations. Gastrointestinal: Negative for abdominal pain, blood in stool, constipation, diarrhea, nausea and vomiting. Genitourinary: Negative for difficulty urinating, dysuria, frequency, pelvic pain, urgency and vaginal bleeding. Musculoskeletal: Negative for arthralgias and myalgias. Skin: Negative for rash. Neurological: Negative for dizziness, syncope, weakness, light-headedness, numbness and headaches. Hematological: Negative for adenopathy. Psychiatric/Behavioral: Negative for suicidal ideas. The patient is not nervous/anxious. Current Outpatient Medications:     aspirin 81 MG tablet, Take 81 mg by mouth daily, Disp: , Rfl:     terazosin (HYTRIN) 1 MG capsule, Take 2 capsules by mouth daily Instructed to take with sip water am of procedure, Disp: 90 capsule, Rfl: 0    HYDROcodone-acetaminophen (NORCO) 5-325 MG per tablet, Take 1 tablet by mouth every 8 hours as needed for Pain for up to 30 days. , Disp: 45 tablet, Rfl: 0    lisinopril (PRINIVIL;ZESTRIL) 10 MG tablet, Take 1 tablet by mouth daily, Disp: 30 tablet, Rfl: 3    levothyroxine (SYNTHROID) 25 MCG tablet, Take 25 mcg by mouth Daily, Disp: , Rfl:     pyridoxine (B-6) 100 MG tablet, Take 50 mg by mouth daily, Disp: , Rfl:     vitamin B-12 (CYANOCOBALAMIN) 500 MCG tablet, Take 500 mcg by mouth daily, Disp: , Rfl:     Multiple Vitamins-Minerals (CENTRUM SILVER PO), Take 500 mg by mouth daily, Disp: , Rfl:    omeprazole (PRILOSEC) 40 MG capsule, Take 40 mg by mouth daily Instructed to take with sip water am of procedure, Disp: , Rfl:     sertraline (ZOLOFT) 50 MG tablet, 50 mg daily Ordered 1 1/2 tab daily. Instructed to take with sip water am of procedure, Disp: , Rfl:     verapamil (CALAN-SR) 240 MG CR tablet, Take 1 tablet by mouth daily. (Patient taking differently: Take 240 mg by mouth daily Instructed to take with sip water am of procedure), Disp: 30 tablet, Rfl: 1    ascorbic acid (VITAMIN C) 500 MG tablet, Take 1,000 mg by mouth daily. , Disp: , Rfl:     vitamin E 1000 UNITS capsule, Take 1,000 Units by mouth daily. , Disp: , Rfl:     Cholecalciferol (VITAMIN D3) 1000 units TABS, Take 1,000 Units by mouth daily , Disp: , Rfl:     atorvastatin (LIPITOR) 20 MG tablet, Take 20 mg by mouth daily. 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    Depression     GERD (gastroesophageal reflux disease)     Hyperlipemia     Hypertension     Impaired fasting glucose 5/14/2019    Postoperative hypothyroidism 5/14/2019    Presence of right artificial knee joint 5/14/2019    Thyroid disease        Past Surgical History:   Procedure Laterality Date    CHOLECYSTECTOMY      EYE SURGERY      bilat cataract    HYSTERECTOMY      LUMBAR LAMINECTOMY  05/29/2013    L3-L4 with microdiscectomy    [facility] TOTAL KNEE ARTHROPLASTY Right 5/1/2018    RIGHT KNEE TOTAL ARTHROPLASTY ([facility])---PRP---[facility]--- performed by Nikhil Peres [facility] at [facility]       Family History   Problem Relation Age of Onset    Cancer Mother         stomach    Heart Attack Father     Coronary Art Dis Father        Social History     Socioeconomic History    Marital status:       Spouse name: Not on file    Number of children: Not on file    Years of education: Not on file    Highest education level: Not on file   Occupational History    Not on file   Social Needs    Financial resource strain: Not on file    Food insecurity:     Worry: Not on file     Inability: Not on file    Transportation needs:     Medical: Not on file     Non-medical: Not on file   Tobacco Use    Smoking status: Never Smoker    Smokeless tobacco: Never Used   Substance and Sexual Activity    Alcohol use: No    Drug use: No    Sexual activity: Not on file   Lifestyle    Physical activity:     Days per week: Not on file     Minutes per session: Not on file    Stress: Not on file   Relationships    Social connections:     Talks on phone: Not on file     Gets together: Not on file     Attends Rastafari service: Not on file     Active member of club or organization: Not on file     Attends meetings of clubs or organizations: Not on file     Relationship status: Not on file    Intimate partner violence:     Fear of current or ex partner: Not on file     Emotionally abused: Not on file     Physically abused: Not on file     Forced sexual activity: Not on file   Other Topics Concern    Not on file   Social History Narrative    Not on file       Vitals:    05/14/19 1332   BP: (!) 148/86   Pulse: 78   Temp: 97.7 °F (36.5 °C)   SpO2: 96%   Weight: 162 lb (73.5 kg)   Height: 5' 4\" (1.626 m)       Exam:  Physical Exam   Constitutional: She is oriented to person, place, and time. She appears well-developed and well-nourished. HENT:   Head: Normocephalic and atraumatic. Right Ear: External ear normal.   Left Ear: External ear normal.   Mouth/Throat: Oropharynx is clear and moist.   Eyes: Pupils are equal, round, and reactive to light. EOM are normal.   Neck: Normal range of motion. Neck supple. No thyromegaly present. Cardiovascular: Normal rate, regular rhythm and normal heart sounds. No murmur heard. Pulmonary/Chest: Effort normal and breath sounds normal. She has no wheezes. Abdominal: Soft.  Bowel sounds are normal. She exhibits no distension. There is no tenderness. There is no rebound and no guarding. Musculoskeletal: She exhibits no edema. Lumbar back: She exhibits decreased range of motion. Lymphadenopathy:     She has no cervical adenopathy. Neurological: She is alert and oriented to person, place, and time. No cranial nerve deficit. Skin: Skin is warm and dry. Psychiatric: She has a normal mood and affect. Judgment normal.       Assessment and Plan:    Na Palencia was seen today for hypertension. Diagnoses and all orders for this visit:    Essential hypertension  - continue present treatment  - monitor blood pressure at home  - watch diet - decreased salt. - on lisinopril  - on verapamil   - on terasozin   - Elevated today, would suggest to increase lisinopril - declines    Mixed hyperlipidemia  - continue present treatment  - watch diet  - on atorvastatin   - follow labs (5/2019) - stable     Current mild episode of major depressive disorder, unspecified whether recurrent ([address]  - continue present treatment  - cont zoloft 75mg  - no suicidal thoughts    Gastroesophageal reflux disease without esophagitis  - continue present treatment  - watch diet  - on omeprazole 40mg     Generalized osteoarthritis  - continue present treatment  - following with ortho  - stable    OARRS report reviewed (5/14/19)  Controlled substance agreement updated (1/23/19)    Patient requests narcotic medication refill. I have reviewed the current medications  and prior notes regarding the need for these refills. I believe the need for refill is  warranted at this time. I have reviewed the OARRS report and found no suspicion of  drug seeking behavior. I have discussed the side effects and narcotic policy with this  patient and the patient has demonstrated understanding. A follow up appointment  will be scheduled with me.     Controlled Substances Monitoring:     RX Monitoring 5/14/2019   Attestation The Prescription Monitoring Report for this patient was reviewed today. Chronic Pain Routine Monitoring Possible medication side effects, risk of tolerance/dependence & alternative treatments discussed. ;Obtaining appropriate analgesic effect of treatment. 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  - s/p partial thyroidectomy  - reviewed path - Hurthle cell  - monitor labs - slight underactive as of (5/2019)  - increased to 50mcg (5/2017)    Postoperative hypothyroidism  - continue present treatment   - s/p partial thyroidectomy  - monitor labs - slight underactive as of (5/2019)  - on synthroid and increased to 50mcg (5/2017)    Impaired fasting glucose  -continue present treatment  - A1c (5/2019) - normal  - watch diet    Vitamin D deficiency  - Continue present treatment   - on otc supplement   - stable     Presence of right artificial knee joint    Long term current use of therapeutic drug  - Chronic PPI therapy   - follow bone and B12        Return in about 6 weeks (around 6/25/2019) for check up and review. No orders of the defined types were placed in this encounter.       Iris Johnson MD  5/14/2019  4:20 PM

## 2019-06-18 ENCOUNTER — OFFICE VISIT (OUTPATIENT)
Dept: FAMILY MEDICINE CLINIC | Age: 84
End: 2019-06-18
Payer: MEDICARE

## 2019-06-18 VITALS
SYSTOLIC BLOOD PRESSURE: 134 MMHG | HEART RATE: 74 BPM | WEIGHT: 158.13 LBS | HEIGHT: 64 IN | DIASTOLIC BLOOD PRESSURE: 64 MMHG | TEMPERATURE: 98.7 F | BODY MASS INDEX: 27 KG/M2 | OXYGEN SATURATION: 96 %

## 2019-06-18 DIAGNOSIS — I10 ESSENTIAL HYPERTENSION: Primary | Chronic | ICD-10-CM

## 2019-06-18 DIAGNOSIS — E78.2 MIXED HYPERLIPIDEMIA: Chronic | ICD-10-CM

## 2019-06-18 DIAGNOSIS — K21.9 GASTROESOPHAGEAL REFLUX DISEASE WITHOUT ESOPHAGITIS: Chronic | ICD-10-CM

## 2019-06-18 DIAGNOSIS — M15.9 GENERALIZED OSTEOARTHRITIS: ICD-10-CM

## 2019-06-18 DIAGNOSIS — E89.0 POSTOPERATIVE HYPOTHYROIDISM: ICD-10-CM

## 2019-06-18 DIAGNOSIS — Z96.651 PRESENCE OF RIGHT ARTIFICIAL KNEE JOINT: ICD-10-CM

## 2019-06-18 DIAGNOSIS — E55.9 VITAMIN D DEFICIENCY: ICD-10-CM

## 2019-06-18 DIAGNOSIS — Z79.899 LONG TERM CURRENT USE OF THERAPEUTIC DRUG: ICD-10-CM

## 2019-06-18 DIAGNOSIS — N39.3 STRESS INCONTINENCE OF URINE: ICD-10-CM

## 2019-06-18 DIAGNOSIS — F32.0 CURRENT MILD EPISODE OF MAJOR DEPRESSIVE DISORDER, UNSPECIFIED WHETHER RECURRENT (HCC): Chronic | ICD-10-CM

## 2019-06-18 DIAGNOSIS — D34 HURTHLE CELL TUMOR: ICD-10-CM

## 2019-06-18 DIAGNOSIS — R73.01 IMPAIRED FASTING GLUCOSE: ICD-10-CM

## 2019-06-18 PROCEDURE — 99213 OFFICE O/P EST LOW 20 MIN: CPT | Performed by: INTERNAL MEDICINE

## 2019-06-18 RX ORDER — LISINOPRIL 10 MG/1
10 TABLET ORAL DAILY
Qty: 90 TABLET | Refills: 1 | Status: SHIPPED | OUTPATIENT
Start: 2019-06-18 | End: 2019-09-10 | Stop reason: SDUPTHER

## 2019-06-18 RX ORDER — VERAPAMIL HYDROCHLORIDE 240 MG/1
240 TABLET, FILM COATED, EXTENDED RELEASE ORAL DAILY
Qty: 90 TABLET | Refills: 1 | Status: SHIPPED | OUTPATIENT
Start: 2019-06-18 | End: 2019-12-09 | Stop reason: SDUPTHER

## 2019-06-18 RX ORDER — HYDROCODONE BITARTRATE AND ACETAMINOPHEN 5; 325 MG/1; MG/1
1 TABLET ORAL EVERY 8 HOURS PRN
COMMUNITY
End: 2019-06-18 | Stop reason: SDUPTHER

## 2019-06-18 RX ORDER — TERAZOSIN 2 MG/1
2 CAPSULE ORAL NIGHTLY
Qty: 90 CAPSULE | Refills: 1 | Status: SHIPPED | OUTPATIENT
Start: 2019-06-18 | End: 2019-07-30 | Stop reason: SDUPTHER

## 2019-06-18 RX ORDER — HYDROCODONE BITARTRATE AND ACETAMINOPHEN 5; 325 MG/1; MG/1
1 TABLET ORAL EVERY 8 HOURS PRN
Qty: 45 TABLET | Refills: 0 | Status: SHIPPED | OUTPATIENT
Start: 2019-06-18 | End: 2019-07-18

## 2019-06-18 RX ORDER — OMEPRAZOLE 40 MG/1
40 CAPSULE, DELAYED RELEASE ORAL DAILY
Qty: 90 CAPSULE | Refills: 1 | Status: SHIPPED | OUTPATIENT
Start: 2019-06-18 | End: 2019-12-09 | Stop reason: SDUPTHER

## 2019-06-18 RX ORDER — LEVOTHYROXINE SODIUM 0.05 MG/1
50 TABLET ORAL DAILY
Qty: 90 TABLET | Refills: 1 | Status: SHIPPED | OUTPATIENT
Start: 2019-06-18 | End: 2019-12-09 | Stop reason: SDUPTHER

## 2019-06-18 RX ORDER — TERAZOSIN 2 MG/1
1 CAPSULE ORAL NIGHTLY
COMMUNITY
Start: 2019-05-14 | End: 2019-06-18 | Stop reason: SDUPTHER

## 2019-06-18 ASSESSMENT — ENCOUNTER SYMPTOMS
ABDOMINAL PAIN: 0
COUGH: 0
SORE THROAT: 0
DIARRHEA: 0
SHORTNESS OF BREATH: 0
EYE PAIN: 0
BLOOD IN STOOL: 0
CONSTIPATION: 0
NAUSEA: 0
CHEST TIGHTNESS: 0
VOMITING: 0
RHINORRHEA: 0

## 2019-06-18 NOTE — PROGRESS NOTES
[facility]) Physicians   Internal Medicine     2019  Hugh Dove : 1933 Sex: female  Age:85 y.o. Chief Complaint   Patient presents with    Knee Pain     RIGHT KNEE    Back Pain     CHRONIC        HPI:   Patient presents to office for review and management of chronic medical conditions.    - States having some back discomfort. Unchanged. No numbness, tingling, weakness. No fever or chills. States lower  back bilateral. Declines work up. - States has GERD, on omeprazole - Stable with omeprazole dose. - States has had right total knee arthoplasty for arthritis. Still having right Knee pain. States also has  some back pain. States following with ortho (Dr. Monique Chong). States had compression stockings. States also  using heating pad to back. States needs refill of meds. Takes Norco mostly at night - helping. No reported side effects or complications reported. 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 No side effects reported. Administration instructions reviewed. Last Lab (2019) - borederline underactive     - States has high blood pressure, does check blood pressure at home occasionally at home. range  140-150's. On verapamil and lisinopril and terazosin. - States has high cholesterol, try's to watch diet, on cholesterol meds - atorvastatin. - States depression symptoms are stable. On Zoloft. Medication has helped. No side effects. States  stable. States  season is a difficult time of year. Declines counselling.    - States has urinary incontinence - stable with meds (terazosin).     Health Maintenance   - immunizations:   Influenza Vaccination - (2016) , (10/2017), (10/2018) - RA  Pneumonia Vaccination - (2015) - prevnar, (2016) - pneumonoccal  Zoster/Shingles Vaccine  Tetanus Vaccination - (12/15/2015)    - Screenings:   Bone Density Scan - (2019)   Pelvic/Pap Exam  Mammogram - (2014), (4/2019) - neg     Colonoscopy - (2011) - neg per patient    Couseled on Home Safety - (11/28/2017)    Carotid Artery Study - (3/2016) - <50% b/l, also showed thyroid nodules    ROS:  Review of Systems   Constitutional: Negative for appetite change, chills, fever and unexpected weight change. HENT: Negative for congestion, rhinorrhea and sore throat. Eyes: Negative for pain and visual disturbance. Respiratory: Negative for cough, chest tightness and shortness of breath. Cardiovascular: Negative for chest pain and palpitations. Gastrointestinal: Negative for abdominal pain, blood in stool, constipation, diarrhea, nausea and vomiting. Genitourinary: Negative for difficulty urinating, dysuria, frequency, pelvic pain, urgency and vaginal bleeding. Musculoskeletal: Negative for arthralgias and myalgias. Skin: Negative for rash. Neurological: Negative for dizziness, syncope, weakness, light-headedness, numbness and headaches. Hematological: Negative for adenopathy. Psychiatric/Behavioral: Negative for suicidal ideas. The patient is not nervous/anxious. Current Outpatient Medications:     HYDROcodone-acetaminophen (NORCO) 5-325 MG per tablet, Take 1 tablet by mouth every 8 hours as needed for Pain for up to 30 days. , Disp: 45 tablet, Rfl: 0    lisinopril (PRINIVIL;ZESTRIL) 10 MG tablet, Take 1 tablet by mouth daily, Disp: 90 tablet, Rfl: 1    omeprazole (PRILOSEC) 40 MG delayed release capsule, Take 1 capsule by mouth daily Instructed to take with sip water am of procedure, Disp: 90 capsule, Rfl: 1    levothyroxine (SYNTHROID) 50 MCG tablet, Take 1 tablet by mouth Daily, Disp: 90 tablet, Rfl: 1    terazosin (HYTRIN) 2 MG capsule, Take 1 capsule by mouth nightly, Disp: 90 capsule, Rfl: 1    verapamil (CALAN SR) 240 MG extended release tablet, Take 1 tablet by mouth daily, Disp: 90 tablet, Rfl: 1    aspirin 81 MG tablet, Take 81 mg by mouth daily, Disp: , Rfl:     pyridoxine (B-6) 100 MG tablet, Take 50 mg by mouth daily, Disp: , Rfl:     vitamin B-12 (CYANOCOBALAMIN) 500 MCG tablet, Take 500 mcg by mouth daily, Disp: , Rfl:     Multiple Vitamins-Minerals (CENTRUM SILVER PO), Take 500 mg by mouth daily, Disp: , Rfl:     sertraline (ZOLOFT) 50 MG tablet, 50 mg daily Ordered 1 1/2 tab daily. Instructed to take with sip water am of procedure, Disp: , Rfl:     ascorbic acid (VITAMIN C) 500 MG tablet, Take 1,000 mg by mouth daily. , Disp: , Rfl:     vitamin E 1000 UNITS capsule, Take 1,000 Units by mouth daily. , Disp: , Rfl:     Cholecalciferol (VITAMIN D3) 1000 units TABS, Take 1,000 Units by mouth daily , Disp: , Rfl:     atorvastatin (LIPITOR) 20 MG tablet, Take 20 mg by mouth daily. 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    Depression     GERD (gastroesophageal reflux disease)     Hyperlipemia     Hypertension     Impaired fasting glucose 5/14/2019    Postoperative hypothyroidism 5/14/2019    Presence of right artificial knee joint 5/14/2019    Thyroid disease        Past Surgical History:   Procedure Laterality Date    CHOLECYSTECTOMY      EYE SURGERY      bilat cataract    HYSTERECTOMY      LUMBAR LAMINECTOMY  05/29/2013    L3-L4 with microdiscectomy    CT TOTAL KNEE ARTHROPLASTY Right 5/1/2018    RIGHT KNEE TOTAL ARTHROPLASTY ([facility])---PRP---[facility]--- performed by Elinor Jimenez MD at [facility] OR       Family History   Problem Relation Age of Onset    Cancer Mother         stomach    Heart Attack Father     Coronary Art Dis Father        Social History     Socioeconomic History    Marital status:       Spouse name: Not on file    Number of children: Not on file    Years of education: Not on file    Highest education level: Not on file   Occupational History    Not on file   Social Needs    Financial resource strain: Not on file    Food insecurity:     Worry: Not on file     Inability: Not on file    Transportation needs:     Medical: Not on file     Non-medical: Not on file   Tobacco Use    Smoking status: Never Smoker    Smokeless tobacco: Never Used   Substance and Sexual Activity    Alcohol use: No    Drug use: No    Sexual activity: Not on file   Lifestyle    Physical activity:     Days per week: Not on file     Minutes per session: Not on file    Stress: Not on file   Relationships    Social connections:     Talks on phone: Not on file     Gets together: Not on file     Attends Hindu service: Not on file     Active member of club or organization: Not on file     Attends meetings of clubs or organizations: Not on file     Relationship status: Not on file    Intimate partner violence:     Fear of current or ex partner: Not on file     Emotionally abused: Not on file     Physically abused: Not on file     Forced sexual activity: Not on file   Other Topics Concern    Not on file   Social History Narrative    Not on file       Vitals:    06/18/19 1352   BP: 134/64   Pulse: 74   Temp: 98.7 °F (37.1 °C)   SpO2: 96%   Weight: 158 lb 2 oz (71.7 kg)   Height: 5' 4\" (1.626 m)       Exam:  Physical Exam   Constitutional: She is oriented to person, place, and time. She appears well-developed and well-nourished. HENT:   Head: Normocephalic and atraumatic. Right Ear: External ear normal.   Left Ear: External ear normal.   Mouth/Throat: Oropharynx is clear and moist.   Eyes: Pupils are equal, round, and reactive to light. EOM are normal.   Neck: Normal range of motion. Neck supple. No thyromegaly present. Cardiovascular: Normal rate, regular rhythm and normal heart sounds. No murmur heard. Pulmonary/Chest: Effort normal and breath sounds normal. She has no wheezes. Abdominal: Soft. Bowel sounds are normal. She exhibits no distension. There is no tenderness. There is no rebound and no guarding. Musculoskeletal: She exhibits no edema. Lumbar back: She exhibits decreased range of motion. Lymphadenopathy:     She has no cervical adenopathy. Neurological: She is alert and oriented to person, place, and time. No cranial nerve deficit. Skin: Skin is warm and dry. Psychiatric: She has a normal mood and affect. Judgment normal.       Assessment and Plan:    Gerstacyean Lipoma was seen today for hypertension. Diagnoses and all orders for this visit:    Essential hypertension  - continue present treatment  - monitor blood pressure at home  - watch diet - decreased salt. - on lisinopril  - on verapamil   - on terasozin   - Elevated today, would suggest to increase lisinopril - declines    Mixed hyperlipidemia  - continue present treatment  - watch diet  - on atorvastatin   - follow labs (5/2019) - stable     Current mild episode of major depressive disorder, unspecified whether recurrent ([address]  - continue present treatment  - cont zoloft 75mg  - no suicidal thoughts    Gastroesophageal reflux disease without esophagitis  - continue present treatment  - watch diet  - on omeprazole 40mg     Generalized osteoarthritis  - continue present treatment  - following with ortho  - stable    OARRS report reviewed (6/18/19)  Controlled substance agreement updated (1/23/19)    Patient requests narcotic medication refill. I have reviewed the current medications  and prior notes regarding the need for these refills. I believe the need for refill is  warranted at this time. I have reviewed the OARRS report and found no suspicion of  drug seeking behavior. I have discussed the side effects and narcotic policy with this  patient and the patient has demonstrated understanding. A follow up appointment  will be scheduled with me.     Controlled Substances Monitoring:     RX Monitoring 6/18/2019   Attestation -   Periodic Controlled Substance Monitoring Possible medication side effects, risk of tolerance/dependence & alternative treatments discussed. ;No signs of potential drug abuse or diversion identified.;Obtaining appropriate analgesic effect of treatment. 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  - s/p partial thyroidectomy  - reviewed path - Hurthle cell  - monitor labs - slight underactive as of (5/2019)  - increased to 50mcg (5/2017)    Postoperative hypothyroidism  - continue present treatment   - s/p partial thyroidectomy  - monitor labs - slight underactive as of (5/2019)  - on synthroid and increased to 50mcg (5/2017)    Impaired fasting glucose  -continue present treatment  - A1c (5/2019) - normal  - watch diet    Vitamin D deficiency  - Continue present treatment   - on otc supplement   - stable     Presence of right artificial knee joint    Long term current use of therapeutic drug  - Chronic PPI therapy   - follow bone and B12        Return in about 6 weeks (around 7/30/2019) for check up and review. No orders of the defined types were placed in this encounter.       Erica Kirk MD  6/18/2019  5:11 PM

## 2019-07-30 ENCOUNTER — OFFICE VISIT (OUTPATIENT)
Dept: FAMILY MEDICINE CLINIC | Age: 84
End: 2019-07-30
Payer: MEDICARE

## 2019-07-30 ENCOUNTER — HOSPITAL ENCOUNTER (OUTPATIENT)
Age: 84
Discharge: HOME | End: 2019-08-01
Payer: MEDICARE

## 2019-07-30 VITALS
HEART RATE: 84 BPM | SYSTOLIC BLOOD PRESSURE: 150 MMHG | TEMPERATURE: 97.3 F | WEIGHT: 155 LBS | HEIGHT: 63 IN | OXYGEN SATURATION: 95 % | BODY MASS INDEX: 27.46 KG/M2 | DIASTOLIC BLOOD PRESSURE: 80 MMHG

## 2019-07-30 DIAGNOSIS — K21.9 GASTROESOPHAGEAL REFLUX DISEASE WITHOUT ESOPHAGITIS: Chronic | ICD-10-CM

## 2019-07-30 DIAGNOSIS — M54.16 RIGHT LUMBAR RADICULOPATHY: ICD-10-CM

## 2019-07-30 DIAGNOSIS — R73.01 IMPAIRED FASTING GLUCOSE: ICD-10-CM

## 2019-07-30 DIAGNOSIS — M15.9 GENERALIZED OSTEOARTHRITIS: ICD-10-CM

## 2019-07-30 DIAGNOSIS — N39.3 STRESS INCONTINENCE OF URINE: ICD-10-CM

## 2019-07-30 DIAGNOSIS — E78.2 MIXED HYPERLIPIDEMIA: Chronic | ICD-10-CM

## 2019-07-30 DIAGNOSIS — F32.0 CURRENT MILD EPISODE OF MAJOR DEPRESSIVE DISORDER, UNSPECIFIED WHETHER RECURRENT (HCC): Chronic | ICD-10-CM

## 2019-07-30 DIAGNOSIS — I10 ESSENTIAL HYPERTENSION: Primary | Chronic | ICD-10-CM

## 2019-07-30 DIAGNOSIS — E55.9 VITAMIN D DEFICIENCY: ICD-10-CM

## 2019-07-30 DIAGNOSIS — D34 HURTHLE CELL TUMOR: ICD-10-CM

## 2019-07-30 DIAGNOSIS — Z96.651 PRESENCE OF RIGHT ARTIFICIAL KNEE JOINT: ICD-10-CM

## 2019-07-30 DIAGNOSIS — E89.0 POSTOPERATIVE HYPOTHYROIDISM: ICD-10-CM

## 2019-07-30 DIAGNOSIS — Z79.899 LONG TERM CURRENT USE OF THERAPEUTIC DRUG: ICD-10-CM

## 2019-07-30 LAB
BILIRUBIN, POC: NORMAL
BLOOD URINE, POC: NORMAL
CLARITY, POC: CLEAR
COLOR, POC: YELLOW
GLUCOSE URINE, POC: NORMAL
KETONES, POC: NORMAL
LEUKOCYTE EST, POC: NORMAL
NITRITE, POC: POSITIVE
PH, POC: 6
PROTEIN, POC: NORMAL
SPECIFIC GRAVITY, POC: 1.02
UROBILINOGEN, POC: 1

## 2019-07-30 PROCEDURE — 81002 URINALYSIS NONAUTO W/O SCOPE: CPT | Performed by: INTERNAL MEDICINE

## 2019-07-30 PROCEDURE — 99214 OFFICE O/P EST MOD 30 MIN: CPT | Performed by: INTERNAL MEDICINE

## 2019-07-30 PROCEDURE — 87088 URINE BACTERIA CULTURE: CPT

## 2019-07-30 PROCEDURE — 87186 SC STD MICRODIL/AGAR DIL: CPT

## 2019-07-30 RX ORDER — SULFAMETHOXAZOLE AND TRIMETHOPRIM 800; 160 MG/1; MG/1
1 TABLET ORAL 2 TIMES DAILY
Qty: 14 TABLET | Refills: 0 | Status: SHIPPED | OUTPATIENT
Start: 2019-07-30 | End: 2019-08-06

## 2019-07-30 RX ORDER — TERAZOSIN 2 MG/1
2 CAPSULE ORAL NIGHTLY
Qty: 90 CAPSULE | Refills: 1 | Status: SHIPPED | OUTPATIENT
Start: 2019-07-30 | End: 2020-01-20 | Stop reason: SDUPTHER

## 2019-07-30 RX ORDER — HYDROCODONE BITARTRATE AND ACETAMINOPHEN 5; 325 MG/1; MG/1
1 TABLET ORAL EVERY 6 HOURS PRN
Qty: 90 TABLET | Refills: 0 | Status: SHIPPED | OUTPATIENT
Start: 2019-07-30 | End: 2019-08-29

## 2019-07-30 RX ORDER — HYDROCODONE BITARTRATE AND ACETAMINOPHEN 5; 325 MG/1; MG/1
1 TABLET ORAL EVERY 6 HOURS PRN
COMMUNITY
End: 2019-07-30 | Stop reason: SDUPTHER

## 2019-07-30 ASSESSMENT — ENCOUNTER SYMPTOMS
NAUSEA: 0
VOMITING: 0
BLOOD IN STOOL: 0
RHINORRHEA: 0
EYE PAIN: 0
CHEST TIGHTNESS: 0
COUGH: 0
ABDOMINAL PAIN: 0
SORE THROAT: 0
SHORTNESS OF BREATH: 0
CONSTIPATION: 0
DIARRHEA: 0

## 2019-08-01 ENCOUNTER — TELEPHONE (OUTPATIENT)
Dept: PRIMARY CARE CLINIC | Age: 84
End: 2019-08-01

## 2019-08-01 LAB
ORGANISM: ABNORMAL
URINE CULTURE, ROUTINE: ABNORMAL
URINE CULTURE, ROUTINE: ABNORMAL

## 2019-09-10 ENCOUNTER — OFFICE VISIT (OUTPATIENT)
Dept: FAMILY MEDICINE CLINIC | Age: 84
End: 2019-09-10
Payer: MEDICARE

## 2019-09-10 VITALS
HEIGHT: 64 IN | OXYGEN SATURATION: 96 % | BODY MASS INDEX: 26.63 KG/M2 | SYSTOLIC BLOOD PRESSURE: 162 MMHG | WEIGHT: 156 LBS | DIASTOLIC BLOOD PRESSURE: 68 MMHG | TEMPERATURE: 97.9 F | HEART RATE: 71 BPM

## 2019-09-10 DIAGNOSIS — R73.01 IMPAIRED FASTING GLUCOSE: ICD-10-CM

## 2019-09-10 DIAGNOSIS — K21.9 GASTROESOPHAGEAL REFLUX DISEASE WITHOUT ESOPHAGITIS: Chronic | ICD-10-CM

## 2019-09-10 DIAGNOSIS — E55.9 VITAMIN D DEFICIENCY: ICD-10-CM

## 2019-09-10 DIAGNOSIS — Z96.651 PRESENCE OF RIGHT ARTIFICIAL KNEE JOINT: ICD-10-CM

## 2019-09-10 DIAGNOSIS — F32.0 CURRENT MILD EPISODE OF MAJOR DEPRESSIVE DISORDER, UNSPECIFIED WHETHER RECURRENT (HCC): Chronic | ICD-10-CM

## 2019-09-10 DIAGNOSIS — M54.16 RIGHT LUMBAR RADICULOPATHY: ICD-10-CM

## 2019-09-10 DIAGNOSIS — M15.9 GENERALIZED OSTEOARTHRITIS: ICD-10-CM

## 2019-09-10 DIAGNOSIS — N39.3 STRESS INCONTINENCE OF URINE: ICD-10-CM

## 2019-09-10 DIAGNOSIS — E78.2 MIXED HYPERLIPIDEMIA: Chronic | ICD-10-CM

## 2019-09-10 DIAGNOSIS — D34 HURTHLE CELL TUMOR: ICD-10-CM

## 2019-09-10 DIAGNOSIS — Z23 NEED FOR INFLUENZA VACCINATION: Primary | ICD-10-CM

## 2019-09-10 DIAGNOSIS — I10 ESSENTIAL HYPERTENSION: Chronic | ICD-10-CM

## 2019-09-10 DIAGNOSIS — E89.0 POSTOPERATIVE HYPOTHYROIDISM: ICD-10-CM

## 2019-09-10 DIAGNOSIS — R06.09 DYSPNEA ON EXERTION: ICD-10-CM

## 2019-09-10 DIAGNOSIS — Z79.899 LONG TERM CURRENT USE OF THERAPEUTIC DRUG: ICD-10-CM

## 2019-09-10 PROCEDURE — 93000 ELECTROCARDIOGRAM COMPLETE: CPT | Performed by: INTERNAL MEDICINE

## 2019-09-10 PROCEDURE — 90653 IIV ADJUVANT VACCINE IM: CPT | Performed by: INTERNAL MEDICINE

## 2019-09-10 PROCEDURE — G0008 ADMIN INFLUENZA VIRUS VAC: HCPCS | Performed by: INTERNAL MEDICINE

## 2019-09-10 PROCEDURE — 99214 OFFICE O/P EST MOD 30 MIN: CPT | Performed by: INTERNAL MEDICINE

## 2019-09-10 RX ORDER — HYDROCODONE BITARTRATE AND ACETAMINOPHEN 5; 325 MG/1; MG/1
1 TABLET ORAL EVERY 8 HOURS PRN
COMMUNITY
End: 2019-09-10 | Stop reason: SDUPTHER

## 2019-09-10 RX ORDER — LISINOPRIL 20 MG/1
20 TABLET ORAL DAILY
Qty: 90 TABLET | Refills: 0 | Status: SHIPPED | OUTPATIENT
Start: 2019-09-10 | End: 2019-12-09 | Stop reason: SDUPTHER

## 2019-09-10 RX ORDER — HYDROCODONE BITARTRATE AND ACETAMINOPHEN 5; 325 MG/1; MG/1
1 TABLET ORAL EVERY 8 HOURS PRN
Qty: 45 TABLET | Refills: 0 | Status: SHIPPED | OUTPATIENT
Start: 2019-09-10 | End: 2019-10-10

## 2019-09-10 RX ORDER — SERTRALINE HYDROCHLORIDE 100 MG/1
100 TABLET, FILM COATED ORAL DAILY
Qty: 90 TABLET | Refills: 0 | Status: SHIPPED | OUTPATIENT
Start: 2019-09-10 | End: 2019-12-09 | Stop reason: SDUPTHER

## 2019-09-10 ASSESSMENT — ENCOUNTER SYMPTOMS
BLOOD IN STOOL: 0
RHINORRHEA: 0
EYE PAIN: 0
VOMITING: 0
COUGH: 0
ABDOMINAL PAIN: 0
SHORTNESS OF BREATH: 0
NAUSEA: 0
CONSTIPATION: 0
CHEST TIGHTNESS: 0
SORE THROAT: 0
DIARRHEA: 0

## 2019-09-10 NOTE — PROGRESS NOTES
capsule, Rfl: 1    omeprazole (PRILOSEC) 40 MG delayed release capsule, Take 1 capsule by mouth daily Instructed to take with sip water am of procedure, Disp: 90 capsule, Rfl: 1    levothyroxine (SYNTHROID) 50 MCG tablet, Take 1 tablet by mouth Daily, Disp: 90 tablet, Rfl: 1    verapamil (CALAN SR) 240 MG extended release tablet, Take 1 tablet by mouth daily, Disp: 90 tablet, Rfl: 1    aspirin 81 MG tablet, Take 81 mg by mouth daily, Disp: , Rfl:     pyridoxine (B-6) 50 MG tablet, Take 50 mg by mouth daily , Disp: , Rfl:     vitamin B-12 (CYANOCOBALAMIN) 500 MCG tablet, Take 500 mcg by mouth daily, Disp: , Rfl:     Multiple Vitamins-Minerals (CENTRUM SILVER PO), Take 500 mg by mouth daily, Disp: , Rfl:     ascorbic acid (VITAMIN C) 500 MG tablet, Take 1,000 mg by mouth daily. , Disp: , Rfl:     vitamin E 1000 UNITS capsule, Take 1,000 Units by mouth daily. , Disp: , Rfl:     Cholecalciferol (VITAMIN D3) 1000 units TABS, Take 1,000 Units by mouth daily , Disp: , Rfl:     atorvastatin (LIPITOR) 20 MG tablet, Take 20 mg by mouth daily.   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    Depression     GERD (gastroesophageal reflux disease)     Hyperlipemia     Hypertension     Impaired fasting glucose 5/14/2019    Postoperative hypothyroidism 5/14/2019    Presence of right artificial knee joint 5/14/2019    Thyroid disease        Past Surgical History:   Procedure Laterality Date    CHOLECYSTECTOMY      EYE SURGERY      bilat cataract    HYSTERECTOMY      LUMBAR LAMINECTOMY  05/29/2013    L3-L4 with microdiscectomy    [place] TOTAL KNEE ARTHROPLASTY Right 5/1/2018    RIGHT KNEE TOTAL ARTHROPLASTY (CHARLES)---PRP---PNB--- performed by Stefanie Richards MD at [facility] OR       Family History   Problem Relation Age of Onset    Cancer Mother         stomach    Heart Attack thyromegaly present. Cardiovascular: Normal rate, regular rhythm and normal heart sounds. No murmur heard. Pulmonary/Chest: Effort normal and breath sounds normal. She has no wheezes. Abdominal: Soft. Bowel sounds are normal. She exhibits no distension. There is no tenderness. There is no rebound and no guarding. Musculoskeletal: She exhibits no edema. Lumbar back: She exhibits decreased range of motion. Lymphadenopathy:     She has no cervical adenopathy. Neurological: She is alert and oriented to person, place, and time. No cranial nerve deficit. Skin: Skin is warm and dry. Psychiatric: She has a normal mood and affect. Judgment normal.       Assessment and Plan:    Radha was seen today for hypertension. Diagnoses and all orders for this visit:    Dyspnea on exertion  - Uncertain etiology at present   - check EKG   - given more elevate BP and dyspnea - refer to cardio  - worsen ER     Essential hypertension  - continue present treatment  - monitor blood pressure at home  - watch diet - decreased salt. - on lisinopril  - on verapamil   - on terasozin   - Elevated today, would suggest to increase lisinopril  - check EKG     Mixed hyperlipidemia  - continue present treatment  - watch diet  - on atorvastatin   - follow labs (5/2019) - stable     Current mild episode of major depressive disorder, unspecified whether recurrent ([facility] [address]  - continue present treatment  - increase zoloft to 100mg  - no suicidal thoughts    Gastroesophageal reflux disease without esophagitis  - continue present treatment  - watch diet  - on omeprazole 40mg     Generalized osteoarthritis  - continue present treatment  - following with ortho  - stable    OARRS report reviewed (9/10/19)  Controlled substance agreement updated (1/23/19)    Patient requests narcotic medication refill. I have reviewed the current medications  and prior notes regarding the need for these refills.  I believe the need for refill is  warranted

## 2019-09-12 ENCOUNTER — TELEPHONE (OUTPATIENT)
Dept: CARDIOLOGY CLINIC | Age: 84
End: 2019-09-12

## 2019-10-15 ENCOUNTER — OFFICE VISIT (OUTPATIENT)
Dept: FAMILY MEDICINE CLINIC | Age: 84
End: 2019-10-15
Payer: MEDICARE

## 2019-10-15 VITALS
OXYGEN SATURATION: 98 % | WEIGHT: 154.5 LBS | HEIGHT: 64 IN | DIASTOLIC BLOOD PRESSURE: 88 MMHG | SYSTOLIC BLOOD PRESSURE: 155 MMHG | BODY MASS INDEX: 26.38 KG/M2 | TEMPERATURE: 97.8 F | HEART RATE: 77 BPM

## 2019-10-15 DIAGNOSIS — J06.9 UPPER RESPIRATORY TRACT INFECTION, UNSPECIFIED TYPE: ICD-10-CM

## 2019-10-15 DIAGNOSIS — I10 ESSENTIAL HYPERTENSION: Primary | Chronic | ICD-10-CM

## 2019-10-15 PROCEDURE — 99213 OFFICE O/P EST LOW 20 MIN: CPT | Performed by: INTERNAL MEDICINE

## 2019-10-15 RX ORDER — METHYLPREDNISOLONE 4 MG/1
TABLET ORAL
Qty: 1 KIT | Refills: 0 | Status: SHIPPED | OUTPATIENT
Start: 2019-10-15 | End: 2019-10-21

## 2019-10-15 RX ORDER — CEFDINIR 300 MG/1
300 CAPSULE ORAL 2 TIMES DAILY
Qty: 14 CAPSULE | Refills: 0 | Status: SHIPPED | OUTPATIENT
Start: 2019-10-15 | End: 2019-10-22

## 2019-10-15 RX ORDER — HYDROCODONE BITARTRATE AND ACETAMINOPHEN 5; 325 MG/1; MG/1
1 TABLET ORAL EVERY 8 HOURS PRN
Qty: 90 TABLET | Refills: 0 | Status: CANCELLED | OUTPATIENT
Start: 2019-10-15 | End: 2019-11-14

## 2019-10-15 ASSESSMENT — ENCOUNTER SYMPTOMS
SHORTNESS OF BREATH: 0
NAUSEA: 0
EYE PAIN: 0
VOMITING: 0
BLOOD IN STOOL: 0
ABDOMINAL PAIN: 0
CHEST TIGHTNESS: 0
DIARRHEA: 0
SORE THROAT: 0
COUGH: 0
CONSTIPATION: 0
RHINORRHEA: 0

## 2019-10-30 ENCOUNTER — OFFICE VISIT (OUTPATIENT)
Dept: CARDIOLOGY CLINIC | Age: 84
End: 2019-10-30
Payer: MEDICARE

## 2019-10-30 VITALS
RESPIRATION RATE: 16 BRPM | HEART RATE: 68 BPM | HEIGHT: 64 IN | BODY MASS INDEX: 26.43 KG/M2 | DIASTOLIC BLOOD PRESSURE: 70 MMHG | SYSTOLIC BLOOD PRESSURE: 120 MMHG | WEIGHT: 154.8 LBS

## 2019-10-30 DIAGNOSIS — I10 HYPERTENSION, UNSPECIFIED TYPE: Primary | Chronic | ICD-10-CM

## 2019-10-30 PROCEDURE — 93000 ELECTROCARDIOGRAM COMPLETE: CPT | Performed by: INTERNAL MEDICINE

## 2019-10-30 PROCEDURE — 99214 OFFICE O/P EST MOD 30 MIN: CPT | Performed by: INTERNAL MEDICINE

## 2019-10-30 RX ORDER — HYDROCODONE BITARTRATE AND ACETAMINOPHEN 5; 325 MG/1; MG/1
1 TABLET ORAL EVERY 6 HOURS PRN
COMMUNITY
End: 2019-11-05 | Stop reason: SDUPTHER

## 2019-11-05 ENCOUNTER — NURSE ONLY (OUTPATIENT)
Dept: FAMILY MEDICINE CLINIC | Age: 84
End: 2019-11-05

## 2019-11-05 VITALS — SYSTOLIC BLOOD PRESSURE: 184 MMHG | OXYGEN SATURATION: 98 % | HEART RATE: 70 BPM | DIASTOLIC BLOOD PRESSURE: 80 MMHG

## 2019-11-05 DIAGNOSIS — M15.9 GENERALIZED OSTEOARTHRITIS: ICD-10-CM

## 2019-11-05 DIAGNOSIS — I10 ESSENTIAL HYPERTENSION: Primary | Chronic | ICD-10-CM

## 2019-11-05 PROCEDURE — 99999 PR OFFICE/OUTPT VISIT,PROCEDURE ONLY: CPT | Performed by: INTERNAL MEDICINE

## 2019-11-05 RX ORDER — HYDROCODONE BITARTRATE AND ACETAMINOPHEN 5; 325 MG/1; MG/1
1 TABLET ORAL EVERY 8 HOURS PRN
Qty: 90 TABLET | Refills: 0 | Status: SHIPPED | OUTPATIENT
Start: 2019-11-11 | End: 2019-12-09 | Stop reason: SDUPTHER

## 2019-11-11 RX ORDER — ATORVASTATIN CALCIUM 20 MG/1
20 TABLET, FILM COATED ORAL DAILY
Qty: 30 TABLET | Refills: 1 | Status: SHIPPED | OUTPATIENT
Start: 2019-11-11 | End: 2020-01-20 | Stop reason: SDUPTHER

## 2019-11-14 PROBLEM — J06.9 URI (UPPER RESPIRATORY INFECTION): Status: RESOLVED | Noted: 2019-10-15 | Resolved: 2019-11-14

## 2019-12-09 ENCOUNTER — OFFICE VISIT (OUTPATIENT)
Dept: FAMILY MEDICINE CLINIC | Age: 84
End: 2019-12-09
Payer: MEDICARE

## 2019-12-09 ENCOUNTER — TELEPHONE (OUTPATIENT)
Dept: FAMILY MEDICINE CLINIC | Age: 84
End: 2019-12-09

## 2019-12-09 VITALS
SYSTOLIC BLOOD PRESSURE: 186 MMHG | DIASTOLIC BLOOD PRESSURE: 85 MMHG | HEART RATE: 85 BPM | TEMPERATURE: 97.8 F | BODY MASS INDEX: 25.92 KG/M2 | WEIGHT: 151 LBS | OXYGEN SATURATION: 95 %

## 2019-12-09 DIAGNOSIS — M79.671 RIGHT FOOT PAIN: ICD-10-CM

## 2019-12-09 DIAGNOSIS — R05.9 COUGH: ICD-10-CM

## 2019-12-09 DIAGNOSIS — E78.2 MIXED HYPERLIPIDEMIA: Chronic | ICD-10-CM

## 2019-12-09 DIAGNOSIS — E89.0 POSTOPERATIVE HYPOTHYROIDISM: ICD-10-CM

## 2019-12-09 DIAGNOSIS — E55.9 VITAMIN D DEFICIENCY: Primary | ICD-10-CM

## 2019-12-09 DIAGNOSIS — D34 HURTHLE CELL TUMOR: ICD-10-CM

## 2019-12-09 DIAGNOSIS — M15.9 GENERALIZED OSTEOARTHRITIS: ICD-10-CM

## 2019-12-09 DIAGNOSIS — N39.3 STRESS INCONTINENCE OF URINE: ICD-10-CM

## 2019-12-09 DIAGNOSIS — K21.9 GASTROESOPHAGEAL REFLUX DISEASE WITHOUT ESOPHAGITIS: Chronic | ICD-10-CM

## 2019-12-09 DIAGNOSIS — I65.21 STENOSIS OF RIGHT CAROTID ARTERY: ICD-10-CM

## 2019-12-09 DIAGNOSIS — F32.0 CURRENT MILD EPISODE OF MAJOR DEPRESSIVE DISORDER, UNSPECIFIED WHETHER RECURRENT (HCC): Chronic | ICD-10-CM

## 2019-12-09 DIAGNOSIS — I10 ESSENTIAL HYPERTENSION: Chronic | ICD-10-CM

## 2019-12-09 DIAGNOSIS — R73.01 IMPAIRED FASTING GLUCOSE: ICD-10-CM

## 2019-12-09 PROCEDURE — 99214 OFFICE O/P EST MOD 30 MIN: CPT | Performed by: INTERNAL MEDICINE

## 2019-12-09 RX ORDER — HYDROCHLOROTHIAZIDE 25 MG/1
25 TABLET ORAL EVERY MORNING
Qty: 30 TABLET | Refills: 5 | Status: SHIPPED
Start: 2019-12-09 | End: 2020-03-03 | Stop reason: ALTCHOICE

## 2019-12-09 RX ORDER — VERAPAMIL HYDROCHLORIDE 240 MG/1
240 TABLET, FILM COATED, EXTENDED RELEASE ORAL DAILY
Qty: 90 TABLET | Refills: 1 | Status: SHIPPED
Start: 2019-12-09 | End: 2020-06-25 | Stop reason: SDUPTHER

## 2019-12-09 RX ORDER — OMEPRAZOLE 40 MG/1
40 CAPSULE, DELAYED RELEASE ORAL DAILY
Qty: 90 CAPSULE | Refills: 1 | Status: SHIPPED
Start: 2019-12-09 | End: 2020-06-25 | Stop reason: SDUPTHER

## 2019-12-09 RX ORDER — SERTRALINE HYDROCHLORIDE 100 MG/1
100 TABLET, FILM COATED ORAL DAILY
Qty: 90 TABLET | Refills: 0 | Status: SHIPPED
Start: 2019-12-09 | End: 2020-03-03 | Stop reason: SDUPTHER

## 2019-12-09 RX ORDER — LISINOPRIL 30 MG/1
30 TABLET ORAL DAILY
Qty: 30 TABLET | Refills: 2 | Status: SHIPPED
Start: 2019-12-09 | End: 2020-03-03 | Stop reason: ALTCHOICE

## 2019-12-09 RX ORDER — LEVOTHYROXINE SODIUM 0.05 MG/1
50 TABLET ORAL DAILY
Qty: 90 TABLET | Refills: 1 | Status: SHIPPED | OUTPATIENT
Start: 2019-12-09 | End: 2020-01-20 | Stop reason: SDUPTHER

## 2019-12-09 RX ORDER — HYDROCODONE BITARTRATE AND ACETAMINOPHEN 5; 325 MG/1; MG/1
1 TABLET ORAL EVERY 8 HOURS PRN
Qty: 90 TABLET | Refills: 0 | Status: SHIPPED | OUTPATIENT
Start: 2019-12-09 | End: 2020-03-03

## 2019-12-09 ASSESSMENT — ENCOUNTER SYMPTOMS
NAUSEA: 0
DIARRHEA: 0
CHEST TIGHTNESS: 0
VOMITING: 0
ABDOMINAL PAIN: 0
EYE PAIN: 0
SHORTNESS OF BREATH: 0
COUGH: 0
RHINORRHEA: 0
SORE THROAT: 0
CONSTIPATION: 0
BLOOD IN STOOL: 0

## 2019-12-11 ENCOUNTER — OFFICE VISIT (OUTPATIENT)
Dept: PODIATRY | Age: 84
End: 2019-12-11
Payer: MEDICARE

## 2019-12-11 ENCOUNTER — TELEPHONE (OUTPATIENT)
Dept: FAMILY MEDICINE CLINIC | Age: 84
End: 2019-12-11

## 2019-12-11 DIAGNOSIS — L60.0 INGROWN NAIL: ICD-10-CM

## 2019-12-11 DIAGNOSIS — L03.031 CELLULITIS OF RIGHT TOE: ICD-10-CM

## 2019-12-11 DIAGNOSIS — M79.671 RIGHT FOOT PAIN: Primary | ICD-10-CM

## 2019-12-11 DIAGNOSIS — M79.674 PAIN OF RIGHT GREAT TOE: ICD-10-CM

## 2019-12-11 PROCEDURE — 99203 OFFICE O/P NEW LOW 30 MIN: CPT | Performed by: PODIATRIST

## 2019-12-11 PROCEDURE — 11730 AVULSION NAIL PLATE SIMPLE 1: CPT | Performed by: PODIATRIST

## 2019-12-11 RX ORDER — CEPHALEXIN 500 MG/1
500 CAPSULE ORAL 2 TIMES DAILY
Qty: 14 CAPSULE | Refills: 0 | Status: SHIPPED | OUTPATIENT
Start: 2019-12-11 | End: 2019-12-18

## 2019-12-31 ENCOUNTER — OFFICE VISIT (OUTPATIENT)
Dept: PODIATRY | Age: 84
End: 2019-12-31
Payer: MEDICARE

## 2019-12-31 ENCOUNTER — NURSE ONLY (OUTPATIENT)
Dept: FAMILY MEDICINE CLINIC | Age: 84
End: 2019-12-31
Payer: MEDICARE

## 2019-12-31 VITALS — SYSTOLIC BLOOD PRESSURE: 172 MMHG | DIASTOLIC BLOOD PRESSURE: 70 MMHG

## 2019-12-31 DIAGNOSIS — I10 ESSENTIAL HYPERTENSION: Primary | Chronic | ICD-10-CM

## 2019-12-31 PROCEDURE — 99212 OFFICE O/P EST SF 10 MIN: CPT | Performed by: PODIATRIST

## 2019-12-31 PROCEDURE — 99211 OFF/OP EST MAY X REQ PHY/QHP: CPT | Performed by: INTERNAL MEDICINE

## 2019-12-31 NOTE — PROGRESS NOTES
Patient in office to follow up post nail avulsion right great toenail. No complaints at this time. 19  Dalton Schmitz : 1933 Sex: female  Age: 80 y.o. Patient was referred by Jackson Corral MD    CC:    Follow-up toenail avulsion right great toenail    HPI:   Follow-up toenail avulsion right great toenail  Has completed oral antibiotics. Denies any drainage denies any pain. Has been walking regular socks. No pain very pleased with progression. No additional pedal complaints at this time. ROS:  Const: Denies constitutional symptoms  Musculo: Denies symptoms other than stated above  Skin: Denies symptoms other than stated above       Current Outpatient Medications:     sertraline (ZOLOFT) 100 MG tablet, Take 1 tablet by mouth daily, Disp: 90 tablet, Rfl: 0    verapamil (CALAN SR) 240 MG extended release tablet, Take 1 tablet by mouth daily, Disp: 90 tablet, Rfl: 1    omeprazole (PRILOSEC) 40 MG delayed release capsule, Take 1 capsule by mouth daily Instructed to take with sip water am of procedure, Disp: 90 capsule, Rfl: 1    levothyroxine (SYNTHROID) 50 MCG tablet, Take 1 tablet by mouth Daily, Disp: 90 tablet, Rfl: 1    hydrochlorothiazide (HYDRODIURIL) 25 MG tablet, Take 1 tablet by mouth every morning, Disp: 30 tablet, Rfl: 5    lisinopril (PRINIVIL;ZESTRIL) 30 MG tablet, Take 1 tablet by mouth daily, Disp: 30 tablet, Rfl: 2    HYDROcodone-acetaminophen (NORCO) 5-325 MG per tablet, Take 1 tablet by mouth every 8 hours as needed for Pain for up to 30 days. , Disp: 90 tablet, Rfl: 0    atorvastatin (LIPITOR) 20 MG tablet, Take 1 tablet by mouth daily, Disp: 30 tablet, Rfl: 1    terazosin (HYTRIN) 2 MG capsule, Take 1 capsule by mouth nightly, Disp: 90 capsule, Rfl: 1    aspirin 81 MG tablet, Take 81 mg by mouth daily, Disp: , Rfl:     pyridoxine (B-6) 50 MG tablet, Take 50 mg by mouth daily , Disp: , Rfl:     vitamin B-12 (CYANOCOBALAMIN) 500 MCG tablet, Take 500 mcg by mouth daily, Disp: , Rfl:     Multiple Vitamins-Minerals (CENTRUM SILVER PO), Take 500 mg by mouth daily, Disp: , Rfl:     ascorbic acid (VITAMIN C) 500 MG tablet, Take 1,000 mg by mouth daily. , Disp: , Rfl:     vitamin E 1000 UNITS capsule, Take 1,000 Units by mouth daily. 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  Posterior tibialis palpable bilateral.    Capillary Refill Time:  Immediate return  Hair growth:  Symmetrical and bilateral   Skin:  Not atrophic  Edema: No edema bilateral feet or ankles. Neurologic:  Light touch intact bilateral.      Musculoskeletal/ Orthopedic examination:    Equinis: Absent bilateral  Dorsiflexion, plantarflexion, inversion, eversion bilateral 5 out of 5 muscle strength  Wiggling toes  Negative Homans  No tenderness to palpation entire right great toenail. Dermatology examination:    No open skin lesions or abrasions bilateral lower extremity. Skin well coapted right great toenail. No open wounds no drainage no erythema. Assessment and Plan:  Madyson Cates was seen today for follow-up and ingrown toenail.     Diagnoses and all orders for this visit:    Right foot pain    Ingrown nail    Pain of right great toe        Follow-up toenail avulsion right great toenail  Skin well coapted no open wounds. Progressing very well. Has completed antibiotics. Follow-up as needed. Return if symptoms worsen or fail to improve. Seen By:  Yossi Feldman DPM      Document was created using voice recognition software. Note was reviewed, however may contain grammatical errors.

## 2020-01-01 ENCOUNTER — HOSPITAL ENCOUNTER (EMERGENCY)
Age: 85
Discharge: HOME | End: 2020-01-01
Payer: MEDICARE

## 2020-01-01 ENCOUNTER — APPOINTMENT (OUTPATIENT)
Dept: GENERAL RADIOLOGY | Age: 85
End: 2020-01-01
Payer: MEDICARE

## 2020-01-01 ENCOUNTER — APPOINTMENT (OUTPATIENT)
Dept: CT IMAGING | Age: 85
End: 2020-01-01
Payer: MEDICARE

## 2020-01-01 VITALS
BODY MASS INDEX: 27.11 KG/M2 | OXYGEN SATURATION: 94 % | HEIGHT: 63 IN | DIASTOLIC BLOOD PRESSURE: 67 MMHG | WEIGHT: 153 LBS | TEMPERATURE: 98.3 F | SYSTOLIC BLOOD PRESSURE: 155 MMHG | HEART RATE: 75 BPM | RESPIRATION RATE: 14 BRPM

## 2020-01-01 PROCEDURE — 29125 APPL SHORT ARM SPLINT STATIC: CPT

## 2020-01-01 PROCEDURE — 73110 X-RAY EXAM OF WRIST: CPT

## 2020-01-01 PROCEDURE — 70486 CT MAXILLOFACIAL W/O DYE: CPT

## 2020-01-01 PROCEDURE — 90471 IMMUNIZATION ADMIN: CPT | Performed by: NURSE PRACTITIONER

## 2020-01-01 PROCEDURE — 6360000002 HC RX W HCPCS: Performed by: NURSE PRACTITIONER

## 2020-01-01 PROCEDURE — 90715 TDAP VACCINE 7 YRS/> IM: CPT | Performed by: NURSE PRACTITIONER

## 2020-01-01 PROCEDURE — 99284 EMERGENCY DEPT VISIT MOD MDM: CPT

## 2020-01-01 PROCEDURE — 72125 CT NECK SPINE W/O DYE: CPT

## 2020-01-01 PROCEDURE — 70450 CT HEAD/BRAIN W/O DYE: CPT

## 2020-01-01 RX ADMIN — TETANUS TOXOID, REDUCED DIPHTHERIA TOXOID AND ACELLULAR PERTUSSIS VACCINE, ADSORBED 0.5 ML: 5; 2.5; 8; 8; 2.5 SUSPENSION INTRAMUSCULAR at 16:38

## 2020-01-01 ASSESSMENT — PAIN DESCRIPTION - ONSET: ONSET: GRADUAL

## 2020-01-01 ASSESSMENT — PAIN DESCRIPTION - LOCATION: LOCATION: HEAD;EYE

## 2020-01-01 ASSESSMENT — PAIN DESCRIPTION - FREQUENCY: FREQUENCY: CONTINUOUS

## 2020-01-01 ASSESSMENT — PAIN DESCRIPTION - PAIN TYPE: TYPE: ACUTE PAIN

## 2020-01-01 ASSESSMENT — PAIN DESCRIPTION - PROGRESSION: CLINICAL_PROGRESSION: GRADUALLY WORSENING

## 2020-01-01 ASSESSMENT — PAIN DESCRIPTION - ORIENTATION: ORIENTATION: RIGHT

## 2020-01-01 ASSESSMENT — PAIN SCALES - GENERAL: PAINLEVEL_OUTOF10: 5

## 2020-01-01 ASSESSMENT — PAIN DESCRIPTION - DESCRIPTORS: DESCRIPTORS: HEADACHE;ACHING

## 2020-01-01 NOTE — ED PROVIDER NOTES
Radiologist unless otherwise noted. CT HEAD WO CONTRAST   Final Result      No evidence of acute intracranial hemorrhage or edema. CT FACIAL BONES WO CONTRAST   Final Result      No CT evidence of acute facial fracture. Right periorbital soft tissue contusion with small supraorbital   hematoma. CT CERVICAL SPINE WO CONTRAST   Final Result      No cervical spine fracture or traumatic subluxation. Advanced degenerative changes of the cervical spine. XR WRIST RIGHT (MIN 3 VIEWS)   Final Result      No acute osseous abnormality. Osteoarthritis, most severe at the first [address] 16 joint. ED Course / Medical Decision Making     Medications   Tetanus-Diphth-Acell Pertussis (BOOSTRIX) injection 0.5 mL (0.5 mLs Intramuscular Given 1/1/20 1638)        Re-examination:  1/1/20     Time: 1745-reviewed results with patient. Patient states she does not have an orthopedic surgeon. Dr. Samina Ardon reviewed imaging and recommend patient be placed in a thumb spica splint and follow-up with orthopedics. He denied needing anything for pain at this time. Patient states she has Tererro at home that she can take for pain control until she follows up with orthopedics. Patients symptoms are improving. Consult(s):   None    Procedure(s):   none    MDM:   Patient presents to the ED for a mechanical fall causing head trauma and right wrist trauma. Differential diagnoses included but not limited to orbital hematoma, orbital fracture, intracranial hemorrhage, right wrist fracture. Workup in the ED revealed CT of the head was no evidence of acute internal hemorrhage or edema, CT of the facial bones without contrast revealed no acute facial fractures but there was a right orbital soft tissue contusion with small supraorbital hematoma, CT of cervical revealed no cervical spine fractures or traumatic subulation.   X-ray of right wrist revealed no fractures but osteoarthritis most severe at the first [address] 16

## 2020-01-03 ENCOUNTER — TELEPHONE (OUTPATIENT)
Dept: FAMILY MEDICINE CLINIC | Age: 85
End: 2020-01-03

## 2020-01-03 NOTE — TELEPHONE ENCOUNTER
Viktor Began calling for a referral to an Ortho for the injury of her right hand. She does not have a preference. She will keep her appt with you on the 7th.

## 2020-01-06 NOTE — TELEPHONE ENCOUNTER
Spoke w/ pt, she was placed in a non-removable splint. The splint is starting to bother her. Per Dr Felipa Will okay to schedule an appt for tomorrow for Xray and removal of splint.

## 2020-01-06 NOTE — TELEPHONE ENCOUNTER
Patient calling back in. Patient fell on new years day and was seen in the hospital.  She had a splint put on her hand. Patient was contacted by the orthopedic today and told they are unable to see her until february and they did not believe patient had a fracture. They recommended patient have another x-ray. Patient also states the splint was really bothering her and she is wondering if that is something you can remove?   Please advise, thank you

## 2020-01-07 ENCOUNTER — TELEPHONE (OUTPATIENT)
Dept: FAMILY MEDICINE CLINIC | Age: 85
End: 2020-01-07

## 2020-01-07 ENCOUNTER — OFFICE VISIT (OUTPATIENT)
Dept: FAMILY MEDICINE CLINIC | Age: 85
End: 2020-01-07
Payer: MEDICARE

## 2020-01-07 VITALS
TEMPERATURE: 98.7 F | OXYGEN SATURATION: 95 % | DIASTOLIC BLOOD PRESSURE: 70 MMHG | WEIGHT: 148 LBS | BODY MASS INDEX: 26.22 KG/M2 | SYSTOLIC BLOOD PRESSURE: 142 MMHG | HEART RATE: 72 BPM | HEIGHT: 63 IN

## 2020-01-07 PROBLEM — M25.531 RIGHT WRIST PAIN: Status: ACTIVE | Noted: 2020-01-07
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
## 2020-01-07 PROCEDURE — 99213 OFFICE O/P EST LOW 20 MIN: CPT | Performed by: INTERNAL MEDICINE

## 2020-01-07 ASSESSMENT — ENCOUNTER SYMPTOMS
EYE PAIN: 0
NAUSEA: 0
SORE THROAT: 0
CONSTIPATION: 0
VOMITING: 0
ABDOMINAL PAIN: 0
BLOOD IN STOOL: 0
COUGH: 0
SHORTNESS OF BREATH: 0
DIARRHEA: 0
CHEST TIGHTNESS: 0
RHINORRHEA: 0

## 2020-01-07 NOTE — TELEPHONE ENCOUNTER
Please let the patient know that repeat x-rays of the right wrist and hand show the severe arthritis, no fractures were seen    Would continue present treatment with exercises    Let us know if not improving -if not would consider physical therapy and/or orthopedics at that time    Thanks

## 2020-01-07 NOTE — PATIENT INSTRUCTIONS
from your body. 2. Bend back your wrist, pointing your hand up toward the ceiling. 3. With your other hand, gently bend your wrist farther until you feel a mild to moderate stretch in your forearm. 4. Hold the stretch for at least 15 to 30 seconds. 5. Repeat 2 to 4 times. 6. Repeat steps 1 through 5, but this time extend your affected arm in front of you with your palm facing up. Then bend back your wrist, pointing your hand toward the floor. Intrinsic flexion    1. Rest the hand with the affected wrist on a table and bend the large joints where your fingers connect to your hand. Keep your thumb and the other joints in your fingers straight. 2. Slowly straighten your fingers. Your wrist should be relaxed, following the line of your fingers and thumb. 3. Move back to your starting position, with your hand bent. 4. Repeat 8 to 12 times. MP extension    1. Place your good hand on a table, palm up. Put the hand with the affected wrist on top of your good hand with your fingers wrapped around the thumb of your good hand like you are making a fist.  2. Slowly uncurl the joints of the hand with the affected wrist where your fingers connect to your hand so that only the top two joints of your fingers are bent. Your fingers will look like a hook. Hold the position for about 6 seconds. 3. Move back to your starting position, with your fingers wrapped around your good thumb. 4. Repeat 8 to 12 times. Follow-up care is a key part of your treatment and safety. Be sure to make and go to all appointments, and call your doctor if you are having problems. It's also a good idea to know your test results and keep a list of the medicines you take. Where can you learn more? Go to [email or web address] org and sign in to your [employer] account. Enter H239 in the [employer] box to learn more about \"Wrist Fracture: Rehab Exercises. \"     If you do not have an account, please click on the \"Sign Up Now\" link. Current as of: September 20, 2018  Content Version: 12.1  © 6761-1358 Healthwise, Incorporated. Care instructions adapted under license by [facility] ([facility]). If you have questions about a medical condition or this instruction, always ask your healthcare professional. [address] any warranty or liability for your use of this information.

## 2020-01-07 NOTE — PROGRESS NOTES
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    Depression     GERD (gastroesophageal reflux disease)     Hyperlipemia     Hypertension     Impaired fasting glucose 5/14/2019    Postoperative hypothyroidism 5/14/2019    Presence of right artificial knee joint 5/14/2019    Thyroid disease        Past Surgical History:   Procedure Laterality Date    CHOLECYSTECTOMY      EYE SURGERY      bilat cataract    HYSTERECTOMY      LUMBAR LAMINECTOMY  05/29/2013    L3-L4 with microdiscectomy    [place] TOTAL KNEE ARTHROPLASTY Right 5/1/2018    RIGHT KNEE TOTAL ARTHROPLASTY (CHARLES)---PRP---PNB--- performed by Karan Gonzalez MD at [facility] OR       Family History   Problem Relation Age of Onset    Cancer Mother         stomach    Heart Attack Father     Coronary Art Dis Father        Social History     Socioeconomic History    Marital status:       Spouse name: Not on file    Number of children: Not on file    Years of education: Not on file    Highest education level: Not on file   Occupational History    Not on file   Social Needs    Financial resource strain: Not on file    Food insecurity:     Worry: Not on file     Inability: Not on file    Transportation needs:     Medical: Not on file     Non-medical: Not on file   Tobacco Use    Smoking status: Never Smoker    Smokeless tobacco: Never Used   Substance and Sexual Activity    Alcohol use: No    Drug use: No    Sexual activity: Not on file   Lifestyle    Physical activity:     Days per week: Not on file     Minutes per session: Not on file    Stress: Not on file   Relationships    Social connections:     Talks on phone: Not on file     Gets together: Not on file     Attends Yazidi service: Not on file Active member of club or organization: Not on file     Attends meetings of clubs or organizations: Not on file     Relationship status: Not on file    Intimate partner violence:     Fear of current or ex partner: Not on file     Emotionally abused: Not on file     Physically abused: Not on file     Forced sexual activity: Not on file   Other Topics Concern    Not on file   Social History Narrative    Not on file       Vitals:    01/07/20 1104   BP: (!) 142/70   Site: Left Upper Arm   Cuff Size: Medium Adult   Pulse: 72   Temp: 98.7 °F (37.1 °C)   SpO2: 95%   Weight: 148 lb (67.1 kg)   Height: 5' 3\" (1.6 m)       Exam:  Physical Exam  Constitutional:       Appearance: She is well-developed. HENT:      Head: Normocephalic and atraumatic. Comments: Facial ecchymosis and swelling      Right Ear: External ear normal.      Left Ear: External ear normal.   Eyes:      Pupils: Pupils are equal, round, and reactive to light. Neck:      Musculoskeletal: Normal range of motion and neck supple. Thyroid: No thyromegaly. Vascular: Carotid bruit present. Comments: Right bruit   Cardiovascular:      Rate and Rhythm: Normal rate and regular rhythm. Heart sounds: Murmur present. Pulmonary:      Effort: Pulmonary effort is normal.      Breath sounds: Normal breath sounds. No wheezing. Abdominal:      General: Bowel sounds are normal. There is no distension. Palpations: Abdomen is soft. Tenderness: There is no tenderness. There is no guarding or rebound. Musculoskeletal:      Right wrist: She exhibits decreased range of motion, tenderness and swelling. Lumbar back: She exhibits decreased range of motion. Right hand: She exhibits decreased range of motion and tenderness. Comments: Ecchymosis and swelling. Pain to palpation. Some pain with ROM. Lymphadenopathy:      Cervical: No cervical adenopathy. Skin:     General: Skin is warm and dry.    Neurological:      Mental Status: She is alert and oriented to person, place, and time. Cranial Nerves: No cranial nerve deficit. Psychiatric:         Judgment: Judgment normal.         Assessment and Plan:    Alexia Torrez was seen today for hypertension. Diagnoses and all orders for this visit:    Fall  - tripped over feet with boots   - still with facial ecchymosis - improved   - still with wrist pain   - xray right wrist and hand   - continue out of cast     Right wrist pain  - removed cast   - xray   - PT and or ortho if not better   Dyspnea on exertion  - Uncertain etiology at present   - check EKG   - has seen cardio  - worsen ER   - unchanged     Essential hypertension  - continue present treatment  - monitor blood pressure at home  - watch diet - decreased salt. - on lisinopril 30mg   - on verapamil   - on terasozin   - now  hctz 12.5mg   - still elevated     Stenosis of right carotid artery  - check US carotid     Mixed hyperlipidemia  - continue present treatment  - watch diet  - on atorvastatin   - follow labs (5/2019) - stable     Current mild episode of major depressive disorder, unspecified whether recurrent ([facility] [address]  - continue present treatment  - increase zoloft to 100mg  - no suicidal thoughts    Gastroesophageal reflux disease without esophagitis  - continue present treatment  - watch diet  - on omeprazole 40mg     Generalized osteoarthritis  - continue present treatment  - following with ortho  - stable    OARRS report reviewed (12/9/19)  Controlled substance agreement updated (1/23/19)    Patient requests narcotic medication refill. I have reviewed the current medications  and prior notes regarding the need for these refills. I believe the need for refill is  warranted at this time. I have reviewed the OARRS report and found no suspicion of  drug seeking behavior. I have discussed the side effects and narcotic policy with this  patient and the patient has demonstrated understanding.  A follow up appointment  will be

## 2020-01-08 PROBLEM — R05.9 COUGH: Status: RESOLVED | Noted: 2019-12-09 | Resolved: 2020-01-08

## 2020-01-15 ENCOUNTER — HOSPITAL ENCOUNTER (OUTPATIENT)
Age: 85
Discharge: HOME | End: 2020-01-17
Payer: MEDICARE

## 2020-01-15 LAB
ALBUMIN SERPL-MCNC: 4.3 G/DL (ref 3.5–5.2)
ALP BLD-CCNC: 91 U/L (ref 35–104)
ALT SERPL-CCNC: 15 U/L (ref 0–32)
ANION GAP SERPL CALCULATED.3IONS-SCNC: 22 MMOL/L (ref 7–16)
AST SERPL-CCNC: 18 U/L (ref 0–31)
BACTERIA: ABNORMAL /HPF
BASOPHILS ABSOLUTE: 0.04 E9/L (ref 0–0.2)
BASOPHILS RELATIVE PERCENT: 0.6 % (ref 0–2)
BILIRUB SERPL-MCNC: 1.2 MG/DL (ref 0–1.2)
BILIRUBIN URINE: NEGATIVE
BLOOD, URINE: NEGATIVE
BUN BLDV-MCNC: 27 MG/DL (ref 8–23)
CALCIUM SERPL-MCNC: 9.8 MG/DL (ref 8.6–10.2)
CHLORIDE BLD-SCNC: 102 MMOL/L (ref 98–107)
CHOLESTEROL, FASTING: 183 MG/DL (ref 0–199)
CLARITY: CLEAR
CO2: 21 MMOL/L (ref 22–29)
COLOR: YELLOW
CREAT SERPL-MCNC: 1.3 MG/DL (ref 0.5–1)
EOSINOPHILS ABSOLUTE: 0.09 E9/L (ref 0.05–0.5)
EOSINOPHILS RELATIVE PERCENT: 1.4 % (ref 0–6)
GFR AFRICAN AMERICAN: 47
GFR NON-AFRICAN AMERICAN: 39 ML/MIN/1.73
GLUCOSE BLD-MCNC: 130 MG/DL (ref 74–99)
GLUCOSE URINE: NEGATIVE MG/DL
HBA1C MFR BLD: 5.4 % (ref 4–5.6)
HCT VFR BLD CALC: 43.1 % (ref 34–48)
HDLC SERPL-MCNC: 67 MG/DL
HEMOGLOBIN: 13.6 G/DL (ref 11.5–15.5)
IMMATURE GRANULOCYTES #: 0.02 E9/L
IMMATURE GRANULOCYTES %: 0.3 % (ref 0–5)
KETONES, URINE: NEGATIVE MG/DL
LDL CHOLESTEROL CALCULATED: 90 MG/DL (ref 0–99)
LEUKOCYTE ESTERASE, URINE: ABNORMAL
LYMPHOCYTES ABSOLUTE: 1.66 E9/L (ref 1.5–4)
LYMPHOCYTES RELATIVE PERCENT: 25.3 % (ref 20–42)
MAGNESIUM: 2.2 MG/DL (ref 1.6–2.6)
MCH RBC QN AUTO: 30.8 PG (ref 26–35)
MCHC RBC AUTO-ENTMCNC: 31.6 % (ref 32–34.5)
MCV RBC AUTO: 97.5 FL (ref 80–99.9)
MICROALBUMIN UR-MCNC: 38.6 MG/L
MONOCYTES ABSOLUTE: 0.44 E9/L (ref 0.1–0.95)
MONOCYTES RELATIVE PERCENT: 6.7 % (ref 2–12)
NEUTROPHILS ABSOLUTE: 4.32 E9/L (ref 1.8–7.3)
NEUTROPHILS RELATIVE PERCENT: 65.7 % (ref 43–80)
NITRITE, URINE: NEGATIVE
PDW BLD-RTO: 12.4 FL (ref 11.5–15)
PH UA: 5 (ref 5–9)
PLATELET # BLD: 201 E9/L (ref 130–450)
PMV BLD AUTO: 11.5 FL (ref 7–12)
POTASSIUM SERPL-SCNC: 4 MMOL/L (ref 3.5–5)
PROTEIN UA: NEGATIVE MG/DL
RBC # BLD: 4.42 E12/L (ref 3.5–5.5)
RBC UA: ABNORMAL /HPF (ref 0–2)
SODIUM BLD-SCNC: 145 MMOL/L (ref 132–146)
SPECIFIC GRAVITY UA: 1.02 (ref 1–1.03)
TOTAL PROTEIN: 7 G/DL (ref 6.4–8.3)
TRIGLYCERIDE, FASTING: 131 MG/DL (ref 0–149)
TSH SERPL DL<=0.05 MIU/L-ACNC: 6.75 UIU/ML (ref 0.27–4.2)
UROBILINOGEN, URINE: 0.2 E.U./DL
VITAMIN D 25-HYDROXY: 38 NG/ML (ref 30–100)
VLDLC SERPL CALC-MCNC: 26 MG/DL
WBC # BLD: 6.6 E9/L (ref 4.5–11.5)
WBC UA: ABNORMAL /HPF (ref 0–5)

## 2020-01-15 PROCEDURE — 83735 ASSAY OF MAGNESIUM: CPT

## 2020-01-15 PROCEDURE — 85025 COMPLETE CBC W/AUTO DIFF WBC: CPT

## 2020-01-15 PROCEDURE — 80061 LIPID PANEL: CPT

## 2020-01-15 PROCEDURE — 80053 COMPREHEN METABOLIC PANEL: CPT

## 2020-01-15 PROCEDURE — 83036 HEMOGLOBIN GLYCOSYLATED A1C: CPT

## 2020-01-15 PROCEDURE — 82044 UR ALBUMIN SEMIQUANTITATIVE: CPT

## 2020-01-15 PROCEDURE — 81001 URINALYSIS AUTO W/SCOPE: CPT

## 2020-01-15 PROCEDURE — 84443 ASSAY THYROID STIM HORMONE: CPT

## 2020-01-15 PROCEDURE — 36415 COLL VENOUS BLD VENIPUNCTURE: CPT

## 2020-01-15 PROCEDURE — 82306 VITAMIN D 25 HYDROXY: CPT

## 2020-01-20 ENCOUNTER — OFFICE VISIT (OUTPATIENT)
Dept: FAMILY MEDICINE CLINIC | Age: 85
End: 2020-01-20
Payer: MEDICARE

## 2020-01-20 VITALS
SYSTOLIC BLOOD PRESSURE: 152 MMHG | TEMPERATURE: 98.6 F | HEIGHT: 63 IN | BODY MASS INDEX: 26.62 KG/M2 | OXYGEN SATURATION: 98 % | HEART RATE: 82 BPM | DIASTOLIC BLOOD PRESSURE: 66 MMHG | WEIGHT: 150.25 LBS

## 2020-01-20 PROBLEM — N28.9 ACUTE KIDNEY INSUFFICIENCY: Status: ACTIVE | Noted: 2020-01-20

## 2020-01-20 PROCEDURE — 99214 OFFICE O/P EST MOD 30 MIN: CPT | Performed by: INTERNAL MEDICINE

## 2020-01-20 RX ORDER — LISINOPRIL 30 MG/1
30 TABLET ORAL DAILY
Qty: 30 TABLET | Refills: 2 | Status: CANCELLED | OUTPATIENT
Start: 2020-01-20

## 2020-01-20 RX ORDER — HYDROCODONE BITARTRATE AND ACETAMINOPHEN 5; 325 MG/1; MG/1
1 TABLET ORAL EVERY 8 HOURS PRN
Qty: 90 TABLET | Refills: 0 | Status: SHIPPED | OUTPATIENT
Start: 2020-01-20 | End: 2020-06-25 | Stop reason: SDUPTHER

## 2020-01-20 RX ORDER — TERAZOSIN 2 MG/1
2 CAPSULE ORAL NIGHTLY
Qty: 90 CAPSULE | Refills: 1 | Status: SHIPPED
Start: 2020-01-20 | End: 2020-10-20 | Stop reason: SDUPTHER

## 2020-01-20 RX ORDER — SERTRALINE HYDROCHLORIDE 100 MG/1
100 TABLET, FILM COATED ORAL DAILY
Qty: 90 TABLET | Refills: 0 | Status: CANCELLED | OUTPATIENT
Start: 2020-01-20

## 2020-01-20 RX ORDER — ATORVASTATIN CALCIUM 20 MG/1
20 TABLET, FILM COATED ORAL DAILY
Qty: 30 TABLET | Refills: 1 | Status: SHIPPED
Start: 2020-01-20 | End: 2020-03-03 | Stop reason: SDUPTHER

## 2020-01-20 RX ORDER — LEVOTHYROXINE SODIUM 0.07 MG/1
75 TABLET ORAL DAILY
Qty: 90 TABLET | Refills: 0 | Status: SHIPPED
Start: 2020-01-20 | End: 2020-03-03 | Stop reason: SDUPTHER

## 2020-01-20 ASSESSMENT — ENCOUNTER SYMPTOMS
ABDOMINAL PAIN: 0
SORE THROAT: 0
DIARRHEA: 0
SHORTNESS OF BREATH: 0
BLOOD IN STOOL: 0
NAUSEA: 0
CHEST TIGHTNESS: 0
COUGH: 0
EYE PAIN: 0
CONSTIPATION: 0
VOMITING: 0
RHINORRHEA: 0

## 2020-01-20 NOTE — PROGRESS NOTES
[facility]) Physicians   Internal Medicine     2020  Nikky Dugan : 1933 Sex: female  Age:86 y.o. Chief Complaint   Patient presents with    Chronic Pain    Arm Injury     Still having right wrist and arm pain from fall a few weeks ago        HPI:   Patient presents to office for review and management of chronic medical conditions.    -  had a fall on 2020. States tripped over own feet, states foot caught was wearing boots. States face forward, landing on face and right wrist. States went to ER. States had CT cervical spine, Ct head, Ct facial bones and Xray of right wrist. No fractures seen only severe arthritis. States was placed to in hard cast and advised to follow up. States was referred to ortho, no appointment for 1 month. States was having pain due to cast. States facial swelling and ecchymosis still present but improved. States pain out of cast with ROM. States still with wrist and arm pain (2020) - declines referral.     - States has chronic rhinitis. States has cough, non productive. States some SOB. States nasal congestion. No sore throat. No chest pain, No fever or chills. No nausea or vomiting.     - States has high blood pressure, does check blood pressure at home occasionally at home. range 140-150's. On verapamil and lisinopril and terazosin And HCTZ. Still running high today, labs now showing some renal insufficiency     - States was walking up a hill. States after walking back down, got lightheaded, dizzy and almost past out. Did not seek care. No further episodes. - States has urinary incontinence - stable with meds (terazosin). States now having urinary frequency. - States having some back discomfort. Unchanged. No numbness, tingling, weakness. No fever or chills. States lower back bilateral. Declines work up. - States has GERD, on omeprazole - Stable with omeprazole dose. - States has had right total knee arthoplasty for arthritis.  Still having right Knee pain. States also has some back pain. States following with ortho (Dr. Ele Jiang). States had compression stockings. States also using heating pad to back. States needs refill of meds. Takes Norco mostly at night - helping. No reported side effects or complications reported. 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 No side effects reported. Administration instructions reviewed. Last Lab (1/2020) - more underactive     - States has high cholesterol, try's to watch diet, on cholesterol meds - atorvastatin. - States depression symptoms are stable. On Zoloft. Medication has helped. No side effects. States stable. States Christmas season is a difficult time of year. Declines counselling. Was to increase to 100mg - but mostly kept at 75mg.     - lab work reviewed with patient. Concern for kidney     Health Maintenance   - immunizations:   Influenza Vaccination - (9/21/2016) , (10/2017), (10/2018), (2019)   Pneumonia Vaccination - (12/2015) - prevnar, (12/2016) - pneumonoccal  Zoster/Shingles Vaccine  Tetanus Vaccination - (12/15/2015)    - Screenings:   Bone Density Scan - (2/28/2019)   Pelvic/Pap Exam  Mammogram - (8/14/2014), (4/2019) - neg     Colonoscopy - (2011) - neg per patient    Couseled on Home Safety - (11/28/2017)    Carotid Artery Study - (3/2016) - <50% b/l, also showed thyroid nodules    ROS:  Review of Systems   Constitutional: Negative for appetite change, chills, fever and unexpected weight change. HENT: Negative for congestion, rhinorrhea and sore throat. Eyes: Negative for pain and visual disturbance. Respiratory: Negative for cough, chest tightness and shortness of breath. Cardiovascular: Negative for chest pain and palpitations. Gastrointestinal: Negative for abdominal pain, blood in stool, constipation, diarrhea, nausea and vomiting. Genitourinary: Positive for difficulty urinating and frequency.  Negative 1000 UNITS capsule, Take 1,000 Units by mouth daily. , Disp: , Rfl:     Cholecalciferol (VITAMIN D3) 1000 units TABS, Take 1,000 Units by mouth daily , Disp: , Rfl:     HYDROcodone-acetaminophen (NORCO) 5-325 MG per tablet, Take 1 tablet by mouth every 8 hours as needed for Pain for up to 30 days. , Disp: 90 tablet, Rfl: 0    No Known Allergies    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    Depression     GERD (gastroesophageal reflux disease)     Hyperlipemia     Hypertension     Impaired fasting glucose 5/14/2019    Postoperative hypothyroidism 5/14/2019    Presence of right artificial knee joint 5/14/2019    Thyroid disease        Past Surgical History:   Procedure Laterality Date    CHOLECYSTECTOMY      EYE SURGERY      bilat cataract    HYSTERECTOMY      LUMBAR LAMINECTOMY  05/29/2013    L3-L4 with microdiscectomy    DC TOTAL KNEE ARTHROPLASTY Right 5/1/2018    RIGHT KNEE TOTAL ARTHROPLASTY (CHARLES)---PRP---PNB--- performed by Danelle Cabrera [facility] at [facility]       Family History   Problem Relation Age of Onset    Cancer Mother         stomach    Heart Attack Father     Coronary Art Dis Father        Social History     Socioeconomic History    Marital status:       Spouse name: Not on file    Number of children: Not on file    Years of education: Not on file    Highest education level: Not on file   Occupational History    Not on file   Social Needs    Financial resource strain: Not on file    Food insecurity:     Worry: Not on file     Inability: Not on file    Transportation needs:     Medical: Not on file     Non-medical: Not on file   Tobacco Use    Smoking status: Never Smoker    Smokeless tobacco: Never Used   Substance and Sexual Activity    Alcohol use: No    Drug use: No    Sexual activity: Not on file   Lifestyle    Physical activity:     Days per week: Not on prior notes regarding the need for these refills. I believe the need for refill is  warranted at this time. I have reviewed the OARRS report and found no suspicion of  drug seeking behavior. I have discussed the side effects and narcotic policy with this  patient and the patient has demonstrated understanding. A follow up appointment  will be scheduled with me. 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  - s/p partial thyroidectomy  - reviewed path - Hurthle cell  - monitor labs - still underactive as of (1/2020)  - increased to 75mcg (1/2020)    Postoperative hypothyroidism  - continue present treatment   - s/p partial thyroidectomy  - monitor labs - slight underactive as of (5/2019)  - on synthroid and increased to 50mcg (5/2017)    Impaired fasting glucose  -continue present treatment  - A1c (1/2020) - normal  - watch diet    Vitamin D deficiency  - Continue present treatment   - on otc supplement   - stable     Presence of right artificial knee joint    Long term current use of therapeutic drug  - Chronic PPI therapy   - follow bone and B12     Right foot pain  - referral to podiatry      Return in about 6 weeks (around 3/2/2020) for check up and review.     Orders Placed This Encounter   Procedures    US RETROPERITONEAL LIMITED     Standing Status:   Future     Standing Expiration Date:   1/20/2021     Order Specific Question:   Reason for exam:     Answer:   kidney    TSH without Reflex     Standing Status:   Future     Standing Expiration Date:   4/20/2020    [facility] - Severa Maffucci, MD, Nephrology, [place]     Referral Priority:   Routine     Referral Type:   Eval and Treat     Referral Reason:   Specialty Services Required     Referred to Provider:   Jessie Greenfield MD     Requested Specialty:   Nephrology     Number of Visits Requested:   1     Requested Prescriptions

## 2020-01-21 ENCOUNTER — TELEPHONE (OUTPATIENT)
Dept: FAMILY MEDICINE CLINIC | Age: 85
End: 2020-01-21
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
## 2020-02-21 ENCOUNTER — HOSPITAL ENCOUNTER (OUTPATIENT)
Age: 85
Discharge: HOME | End: 2020-02-23
Payer: MEDICARE

## 2020-02-21 LAB
ANION GAP SERPL CALCULATED.3IONS-SCNC: 16 MMOL/L (ref 7–16)
BUN BLDV-MCNC: 25 MG/DL (ref 8–23)
CALCIUM SERPL-MCNC: 9.4 MG/DL (ref 8.6–10.2)
CHLORIDE BLD-SCNC: 106 MMOL/L (ref 98–107)
CO2: 22 MMOL/L (ref 22–29)
CREAT SERPL-MCNC: 1 MG/DL (ref 0.5–1)
GFR AFRICAN AMERICAN: >60
GFR NON-AFRICAN AMERICAN: 53 ML/MIN/1.73
GLUCOSE BLD-MCNC: 120 MG/DL (ref 74–99)
POTASSIUM SERPL-SCNC: 3.9 MMOL/L (ref 3.5–5)
SODIUM BLD-SCNC: 144 MMOL/L (ref 132–146)

## 2020-02-21 PROCEDURE — 36415 COLL VENOUS BLD VENIPUNCTURE: CPT

## 2020-02-21 PROCEDURE — 80048 BASIC METABOLIC PNL TOTAL CA: CPT

## 2020-03-03 ENCOUNTER — OFFICE VISIT (OUTPATIENT)
Dept: FAMILY MEDICINE CLINIC | Age: 85
End: 2020-03-03
Payer: MEDICARE

## 2020-03-03 VITALS
TEMPERATURE: 97.8 F | BODY MASS INDEX: 27.33 KG/M2 | WEIGHT: 154.25 LBS | SYSTOLIC BLOOD PRESSURE: 176 MMHG | HEIGHT: 63 IN | DIASTOLIC BLOOD PRESSURE: 70 MMHG | HEART RATE: 75 BPM | OXYGEN SATURATION: 98 %

## 2020-03-03 PROCEDURE — 99214 OFFICE O/P EST MOD 30 MIN: CPT | Performed by: INTERNAL MEDICINE

## 2020-03-03 PROCEDURE — G0444 DEPRESSION SCREEN ANNUAL: HCPCS | Performed by: INTERNAL MEDICINE

## 2020-03-03 RX ORDER — HYDROCODONE BITARTRATE AND ACETAMINOPHEN 5; 325 MG/1; MG/1
1 TABLET ORAL EVERY 8 HOURS PRN
Qty: 90 TABLET | Refills: 0 | Status: CANCELLED | OUTPATIENT
Start: 2020-03-03 | End: 2020-04-02

## 2020-03-03 RX ORDER — LISINOPRIL 30 MG/1
30 TABLET ORAL DAILY
Qty: 30 TABLET | Refills: 2 | Status: CANCELLED | OUTPATIENT
Start: 2020-03-03

## 2020-03-03 RX ORDER — SERTRALINE HYDROCHLORIDE 100 MG/1
100 TABLET, FILM COATED ORAL DAILY
Qty: 90 TABLET | Refills: 0 | Status: SHIPPED
Start: 2020-03-03 | End: 2020-06-01 | Stop reason: SDUPTHER

## 2020-03-03 RX ORDER — ATORVASTATIN CALCIUM 20 MG/1
20 TABLET, FILM COATED ORAL DAILY
Qty: 90 TABLET | Refills: 1 | Status: SHIPPED
Start: 2020-03-03 | End: 2020-09-15 | Stop reason: SDUPTHER

## 2020-03-03 RX ORDER — LEVOTHYROXINE SODIUM 0.07 MG/1
75 TABLET ORAL DAILY
Qty: 90 TABLET | Refills: 0 | Status: SHIPPED
Start: 2020-03-03 | End: 2020-06-01 | Stop reason: SDUPTHER

## 2020-03-03 ASSESSMENT — ENCOUNTER SYMPTOMS
CHEST TIGHTNESS: 0
CONSTIPATION: 0
VOMITING: 0
ABDOMINAL PAIN: 0
DIARRHEA: 0
SHORTNESS OF BREATH: 0
RHINORRHEA: 0
EYE PAIN: 0
NAUSEA: 0
BLOOD IN STOOL: 0
COUGH: 0
SORE THROAT: 0

## 2020-03-03 ASSESSMENT — PATIENT HEALTH QUESTIONNAIRE - PHQ9
3. TROUBLE FALLING OR STAYING ASLEEP: 1
8. MOVING OR SPEAKING SO SLOWLY THAT OTHER PEOPLE COULD HAVE NOTICED. OR THE OPPOSITE, BEING SO FIGETY OR RESTLESS THAT YOU HAVE BEEN MOVING AROUND A LOT MORE THAN USUAL: 0
9. THOUGHTS THAT YOU WOULD BE BETTER OFF DEAD, OR OF HURTING YOURSELF: 0
SUM OF ALL RESPONSES TO PHQ QUESTIONS 1-9: 6
10. IF YOU CHECKED OFF ANY PROBLEMS, HOW DIFFICULT HAVE THESE PROBLEMS MADE IT FOR YOU TO DO YOUR WORK, TAKE CARE OF THINGS AT HOME, OR GET ALONG WITH OTHER PEOPLE: 1
1. LITTLE INTEREST OR PLEASURE IN DOING THINGS: 0
4. FEELING TIRED OR HAVING LITTLE ENERGY: 1
SUM OF ALL RESPONSES TO PHQ9 QUESTIONS 1 & 2: 3
2. FEELING DOWN, DEPRESSED OR HOPELESS: 3
SUM OF ALL RESPONSES TO PHQ QUESTIONS 1-9: 6
5. POOR APPETITE OR OVEREATING: 0
6. FEELING BAD ABOUT YOURSELF - OR THAT YOU ARE A FAILURE OR HAVE LET YOURSELF OR YOUR FAMILY DOWN: 1
7. TROUBLE CONCENTRATING ON THINGS, SUCH AS READING THE NEWSPAPER OR WATCHING TELEVISION: 0

## 2020-03-03 NOTE — PROGRESS NOTES
[facility]) Physicians   Internal Medicine     3/3/2020  Harlan Owens : 1933 Sex: female  Age:86 y.o. Chief Complaint   Patient presents with    Hypertension     Nephrologist stopped Lisinopril and HCTZ     Gastroesophageal Reflux    Depression     PHQ9 completed today     Chronic Pain     Still having pain in right wrist since her fall. HPI:   Patient presents to office for review and management of chronic medical conditions.    - States labs showed renal insuffiencey. Was not sure if related to new medications or increased blood pressure or both. - States had a fall on 2020. No fractures seen only severe arthritis. States was placed to in hard cast. States still with pain. States some restricted ROM. Discussed referral.     - States has chronic rhinitis. States no cough. States nasal congestion. No sore throat. No chest pain, No fever or chills. No nausea or vomiting.     - States has high blood pressure, does check blood pressure at home occasionally at home. range 140-150's. On verapamil and terazosin. States nephrology stopped lisinopril and HCTZ. Still running high today, labs now showing some renal insufficiency - improved. - States has urinary incontinence - stable with meds (terazosin). States now having urinary frequency. - States having some back discomfort. Unchanged. No numbness, tingling, weakness. No fever or chills. States lower back bilateral. Declines work up. - States has GERD, on omeprazole - Stable with omeprazole dose. - States has had right total knee arthoplasty for arthritis. Still having right Knee pain. States also has some back pain. States following with ortho (Dr. Nevaeh Lema). States had compression stockings. States also using heating pad to back. States needs refill of meds. Takes Norco mostly at night - helping. No reported side effects or complications reported. 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  Now hypothyroid and on synthroid. No side effects reported. Administration instructions reviewed. Last Lab (1/2020) - more underactive     - States has high cholesterol, try's to watch diet, on cholesterol meds - atorvastatin. - States depression symptoms are stable. On Zoloft. Medication has helped. No side effects. States stable. States Christmas season is a difficult time of year. Declines counselling. Was to increase to 100mg - but mostly kept at 75mg.     - lab work reviewed with patient. Concern for kidney     Health Maintenance   - immunizations:   Influenza Vaccination - (9/21/2016) , (10/2017), (10/2018), (2019)   Pneumonia Vaccination - (12/2015) - prevnar, (12/2016) - pneumonoccal  Zoster/Shingles Vaccine  Tetanus Vaccination - (12/15/2015)    - Screenings:   Bone Density Scan - (2/28/2019)   Pelvic/Pap Exam  Mammogram - (8/14/2014), (4/2019) - neg     Colonoscopy - (2011) - neg per patient    Couseled on Home Safety - (11/28/2017)    Carotid Artery Study - (3/2016) - <50% b/l, also showed thyroid nodules    ROS:  Review of Systems   Constitutional: Negative for appetite change, chills, fever and unexpected weight change. HENT: Negative for congestion, rhinorrhea and sore throat. Eyes: Negative for pain and visual disturbance. Respiratory: Negative for cough, chest tightness and shortness of breath. Cardiovascular: Negative for chest pain and palpitations. Gastrointestinal: Negative for abdominal pain, blood in stool, constipation, diarrhea, nausea and vomiting. Genitourinary: Positive for difficulty urinating and frequency. Negative for dysuria, pelvic pain, urgency and vaginal bleeding. Musculoskeletal: Negative for arthralgias and myalgias. Skin: Negative for rash. Neurological: Negative for dizziness, syncope, weakness, light-headedness, numbness and headaches. Hematological: Negative for adenopathy. Psychiatric/Behavioral: Negative for suicidal ideas.  The patient is not Arthritis     right knee    Benign neoplasm of thyroid gland 5/14/2019    Depression     GERD (gastroesophageal reflux disease)     Hyperlipemia     Hypertension     Impaired fasting glucose 5/14/2019    Postoperative hypothyroidism 5/14/2019    Presence of right artificial knee joint 5/14/2019    Thyroid disease        Past Surgical History:   Procedure Laterality Date    CHOLECYSTECTOMY      EYE SURGERY      bilat cataract    HYSTERECTOMY      LUMBAR LAMINECTOMY  05/29/2013    L3-L4 with microdiscectomy    HI TOTAL KNEE ARTHROPLASTY Right 5/1/2018    RIGHT KNEE TOTAL ARTHROPLASTY (CHARLES)---PRP---PNB--- performed by Leandro Hernandez MD at [facility] OR       Family History   Problem Relation Age of Onset    Cancer Mother         stomach    Heart Attack Father     Coronary Art Dis Father        Social History     Socioeconomic History    Marital status:       Spouse name: Not on file    Number of children: Not on file    Years of education: Not on file    Highest education level: Not on file   Occupational History    Not on file   Social Needs    Financial resource strain: Not on file    Food insecurity:     Worry: Not on file     Inability: Not on file    Transportation needs:     Medical: Not on file     Non-medical: Not on file   Tobacco Use    Smoking status: Never Smoker    Smokeless tobacco: Never Used   Substance and Sexual Activity    Alcohol use: No    Drug use: No    Sexual activity: Not on file   Lifestyle    Physical activity:     Days per week: Not on file     Minutes per session: Not on file    Stress: Not on file   Relationships    Social connections:     Talks on phone: Not on file     Gets together: Not on file     Attends [employer] service: Not on file     Active member of club or organization: Not on file     Attends meetings of clubs or organizations: Not on file     Relationship status: Not on file    Intimate partner violence:     Fear of current or ex partner: Not on file     Emotionally abused: Not on file     Physically abused: Not on file     Forced sexual activity: Not on file   Other Topics Concern    Not on file   Social History Narrative    Not on file       Vitals:    03/03/20 1329   BP: (!) 176/70   Site: Left Upper Arm   Cuff Size: Medium Adult   Pulse: 75   Temp: 97.8 °F (36.6 °C)   SpO2: 98%   Weight: 154 lb 4 oz (70 kg)   Height: 5' 3\" (1.6 m)       Exam:  Physical Exam  Constitutional:       Appearance: She is well-developed. HENT:      Head: Normocephalic and atraumatic. Comments: Facial ecchymosis and swelling      Right Ear: External ear normal.      Left Ear: External ear normal.   Eyes:      Pupils: Pupils are equal, round, and reactive to light. Neck:      Musculoskeletal: Normal range of motion and neck supple. Thyroid: No thyromegaly. Vascular: Carotid bruit present. Comments: Right bruit   Cardiovascular:      Rate and Rhythm: Normal rate and regular rhythm. Heart sounds: Murmur present. Pulmonary:      Effort: Pulmonary effort is normal.      Breath sounds: Normal breath sounds. No wheezing. Abdominal:      General: Bowel sounds are normal. There is no distension. Palpations: Abdomen is soft. Tenderness: There is no abdominal tenderness. There is no guarding or rebound. Musculoskeletal:      Right wrist: She exhibits decreased range of motion, tenderness and swelling. Lumbar back: She exhibits decreased range of motion. Right hand: She exhibits decreased range of motion and tenderness. Comments: Ecchymosis and swelling. Pain to palpation. Some pain with ROM. Lymphadenopathy:      Cervical: No cervical adenopathy. Skin:     General: Skin is warm and dry. Neurological:      Mental Status: She is alert and oriented to person, place, and time. Cranial Nerves: No cranial nerve deficit.    Psychiatric:         Judgment: Judgment normal.         Assessment and Plan:    Suzi Blanco was seen today for hypertension. Diagnoses and all orders for this visit:    Acute kidney insufficiency  - uncertain etiology   -  May be multifactorial - new medications, long standing HTN   - US kidney ok   - referral to nephrology - stopped lisinopril and HCTZ  - Blood pressure elevated - poss white coat   - labs (2/2020) improved     Right wrist pain  - PT and or ortho if not better  - not improving   - referral to ortho placed     Dyspnea on exertion  - Uncertain etiology at present   - check EKG   - has seen cardio  - worsen ER   - unchanged     Essential hypertension  - continue present treatment  - monitor blood pressure at home  - watch diet - decreased salt.   - on verapamil   - on terasozin   - nephrology stopped lisinopril and hctz 12.5mg   - still elevated - poss white coat     Stenosis of right carotid artery  - Last US carotid 12/2019     Mixed hyperlipidemia  - continue present treatment  - watch diet  - on atorvastatin   - follow labs (5/2019) - stable     Current mild episode of major depressive disorder, unspecified whether recurrent (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

## 2020-06-01 RX ORDER — SERTRALINE HYDROCHLORIDE 100 MG/1
100 TABLET, FILM COATED ORAL DAILY
Qty: 90 TABLET | Refills: 0 | Status: SHIPPED
Start: 2020-06-01 | End: 2020-09-03 | Stop reason: SDUPTHER

## 2020-06-01 RX ORDER — LEVOTHYROXINE SODIUM 0.07 MG/1
75 TABLET ORAL DAILY
Qty: 90 TABLET | Refills: 0 | Status: SHIPPED
Start: 2020-06-01 | End: 2020-11-04 | Stop reason: SDUPTHER

## 2020-06-01 NOTE — TELEPHONE ENCOUNTER
Name of Medication(s) Requested:  Zoloft, Synthroid    Pharmacy Requested:   [place]    Medication(s) pended? [x] Yes  [] No    Last Appointment:  3/3/2020    Future appts:  No future appointments. Does patient need call back?   [] Yes  [x] No

## 2020-06-25 ENCOUNTER — OFFICE VISIT (OUTPATIENT)
Dept: FAMILY MEDICINE CLINIC | Age: 85
End: 2020-06-25
Payer: MEDICARE

## 2020-06-25 VITALS
SYSTOLIC BLOOD PRESSURE: 154 MMHG | BODY MASS INDEX: 27.64 KG/M2 | DIASTOLIC BLOOD PRESSURE: 66 MMHG | TEMPERATURE: 98.5 F | OXYGEN SATURATION: 95 % | HEIGHT: 63 IN | WEIGHT: 156 LBS | HEART RATE: 77 BPM

## 2020-06-25 PROCEDURE — 99214 OFFICE O/P EST MOD 30 MIN: CPT | Performed by: INTERNAL MEDICINE

## 2020-06-25 RX ORDER — OMEPRAZOLE 40 MG/1
40 CAPSULE, DELAYED RELEASE ORAL DAILY
Qty: 90 CAPSULE | Refills: 1 | Status: SHIPPED
Start: 2020-06-25 | End: 2020-12-29 | Stop reason: SDUPTHER

## 2020-06-25 RX ORDER — VERAPAMIL HYDROCHLORIDE 240 MG/1
240 TABLET, FILM COATED, EXTENDED RELEASE ORAL DAILY
Qty: 90 TABLET | Refills: 1 | Status: SHIPPED
Start: 2020-06-25 | End: 2020-09-15

## 2020-06-25 RX ORDER — HYDROCODONE BITARTRATE AND ACETAMINOPHEN 5; 325 MG/1; MG/1
1 TABLET ORAL EVERY 8 HOURS PRN
Qty: 90 TABLET | Refills: 0 | Status: SHIPPED | OUTPATIENT
Start: 2020-06-25 | End: 2020-09-15 | Stop reason: SDUPTHER

## 2020-06-25 ASSESSMENT — ENCOUNTER SYMPTOMS
RHINORRHEA: 0
VOMITING: 0
COUGH: 0
SORE THROAT: 0
BLOOD IN STOOL: 0
EYE PAIN: 0
NAUSEA: 0
CONSTIPATION: 0
CHEST TIGHTNESS: 0
DIARRHEA: 0
SHORTNESS OF BREATH: 0
ABDOMINAL PAIN: 0

## 2020-06-25 NOTE — PROGRESS NOTES
diet    Vitamin D deficiency  - Continue present treatment   - on otc supplement   - stable     Presence of right artificial knee joint    Long term current use of therapeutic drug  - Chronic PPI therapy   - follow bone and B12     Right foot pain  - referral to podiatry   - stable      Return in about 3 months (around 9/25/2020) for check up and review and labs. Orders Placed This Encounter   Procedures    Comprehensive Metabolic Panel     Standing Status:   Future     Standing Expiration Date:   6/25/2021    Magnesium     Standing Status:   Future     Standing Expiration Date:   6/25/2021    Lipid, Fasting     Standing Status:   Future     Standing Expiration Date:   6/25/2021    Hemoglobin A1C     Standing Status:   Future     Standing Expiration Date:   6/25/2021    TSH without Reflex     Standing Status:   Future     Standing Expiration Date:   9/25/2020    Vitamin D 25 Hydroxy     Standing Status:   Future     Standing Expiration Date:   6/25/2021    Urinalysis     Standing Status:   Future     Standing Expiration Date:   6/25/2021    CBC Auto Differential     Standing Status:   Future     Standing Expiration Date:   6/25/2021     Requested Prescriptions     Signed Prescriptions Disp Refills    verapamil (CALAN SR) 240 MG extended release tablet 90 tablet 1     Sig: Take 1 tablet by mouth daily    omeprazole (PRILOSEC) 40 MG delayed release capsule 90 capsule 1     Sig: Take 1 capsule by mouth daily Instructed to take with sip water am of procedure    HYDROcodone-acetaminophen (NORCO) 5-325 MG per tablet 90 tablet 0     Sig: Take 1 tablet by mouth every 8 hours as needed for Pain for up to 30 days. Intended supply: 3 days.  Take lowest dose possible to manage pain     Joseluis Cates MD  6/25/2020  5:54 PM

## 2020-09-03 RX ORDER — SERTRALINE HYDROCHLORIDE 100 MG/1
100 TABLET, FILM COATED ORAL DAILY
Qty: 90 TABLET | Refills: 0 | Status: SHIPPED
Start: 2020-09-03 | End: 2020-11-30 | Stop reason: SDUPTHER

## 2020-09-03 NOTE — TELEPHONE ENCOUNTER
Last Appointment:  6/25/2020  Future Appointments   Date Time Provider Batsheva Delgado   9/15/2020  3:15 PM Cassidy Ruggiero  [address]    '  Pt asking for reifll on sertraline to [place].  Does not have enough til appt

## 2020-09-09 ENCOUNTER — HOSPITAL ENCOUNTER (OUTPATIENT)
Age: 85
Discharge: HOME | End: 2020-09-11
Payer: MEDICARE

## 2020-09-09 LAB
ALBUMIN SERPL-MCNC: 4 G/DL (ref 3.5–5.2)
ALP BLD-CCNC: 88 U/L (ref 35–104)
ALT SERPL-CCNC: 16 U/L (ref 0–32)
ANION GAP SERPL CALCULATED.3IONS-SCNC: 15 MMOL/L (ref 7–16)
AST SERPL-CCNC: 22 U/L (ref 0–31)
BASOPHILS ABSOLUTE: 0.04 E9/L (ref 0–0.2)
BASOPHILS RELATIVE PERCENT: 0.6 % (ref 0–2)
BILIRUB SERPL-MCNC: 0.9 MG/DL (ref 0–1.2)
BILIRUBIN URINE: NEGATIVE
BLOOD, URINE: NEGATIVE
BUN BLDV-MCNC: 15 MG/DL (ref 8–23)
CALCIUM SERPL-MCNC: 9.4 MG/DL (ref 8.6–10.2)
CHLORIDE BLD-SCNC: 105 MMOL/L (ref 98–107)
CHOLESTEROL, FASTING: 168 MG/DL (ref 0–199)
CLARITY: CLEAR
CO2: 22 MMOL/L (ref 22–29)
COLOR: YELLOW
CREAT SERPL-MCNC: 0.9 MG/DL (ref 0.5–1)
EOSINOPHILS ABSOLUTE: 0.06 E9/L (ref 0.05–0.5)
EOSINOPHILS RELATIVE PERCENT: 0.9 % (ref 0–6)
GFR AFRICAN AMERICAN: >60
GFR NON-AFRICAN AMERICAN: 59 ML/MIN/1.73
GLUCOSE BLD-MCNC: 95 MG/DL (ref 74–99)
GLUCOSE URINE: NEGATIVE MG/DL
HBA1C MFR BLD: 5.3 % (ref 4–5.6)
HCT VFR BLD CALC: 41.5 % (ref 34–48)
HDLC SERPL-MCNC: 79 MG/DL
HEMOGLOBIN: 13.4 G/DL (ref 11.5–15.5)
IMMATURE GRANULOCYTES #: 0.01 E9/L
IMMATURE GRANULOCYTES %: 0.1 % (ref 0–5)
KETONES, URINE: NEGATIVE MG/DL
LDL CHOLESTEROL CALCULATED: 71 MG/DL (ref 0–99)
LEUKOCYTE ESTERASE, URINE: NEGATIVE
LYMPHOCYTES ABSOLUTE: 1.57 E9/L (ref 1.5–4)
LYMPHOCYTES RELATIVE PERCENT: 23.2 % (ref 20–42)
MAGNESIUM: 2.2 MG/DL (ref 1.6–2.6)
MCH RBC QN AUTO: 31.9 PG (ref 26–35)
MCHC RBC AUTO-ENTMCNC: 32.3 % (ref 32–34.5)
MCV RBC AUTO: 98.8 FL (ref 80–99.9)
MONOCYTES ABSOLUTE: 0.5 E9/L (ref 0.1–0.95)
MONOCYTES RELATIVE PERCENT: 7.4 % (ref 2–12)
NEUTROPHILS ABSOLUTE: 4.6 E9/L (ref 1.8–7.3)
NEUTROPHILS RELATIVE PERCENT: 67.8 % (ref 43–80)
NITRITE, URINE: NEGATIVE
PDW BLD-RTO: 12.5 FL (ref 11.5–15)
PH UA: 6.5 (ref 5–9)
PLATELET # BLD: 174 E9/L (ref 130–450)
PMV BLD AUTO: 12.4 FL (ref 7–12)
POTASSIUM SERPL-SCNC: 4.6 MMOL/L (ref 3.5–5)
PROTEIN UA: NEGATIVE MG/DL
RBC # BLD: 4.2 E12/L (ref 3.5–5.5)
SODIUM BLD-SCNC: 142 MMOL/L (ref 132–146)
SPECIFIC GRAVITY UA: 1.02 (ref 1–1.03)
TOTAL PROTEIN: 6.4 G/DL (ref 6.4–8.3)
TRIGLYCERIDE, FASTING: 89 MG/DL (ref 0–149)
TSH SERPL DL<=0.05 MIU/L-ACNC: 1.49 UIU/ML (ref 0.27–4.2)
UROBILINOGEN, URINE: 0.2 E.U./DL
VITAMIN D 25-HYDROXY: 34 NG/ML (ref 30–100)
VLDLC SERPL CALC-MCNC: 18 MG/DL
WBC # BLD: 6.8 E9/L (ref 4.5–11.5)

## 2020-09-09 PROCEDURE — 83036 HEMOGLOBIN GLYCOSYLATED A1C: CPT

## 2020-09-09 PROCEDURE — 80053 COMPREHEN METABOLIC PANEL: CPT

## 2020-09-09 PROCEDURE — 83735 ASSAY OF MAGNESIUM: CPT

## 2020-09-09 PROCEDURE — 84443 ASSAY THYROID STIM HORMONE: CPT

## 2020-09-09 PROCEDURE — 85025 COMPLETE CBC W/AUTO DIFF WBC: CPT

## 2020-09-09 PROCEDURE — 82306 VITAMIN D 25 HYDROXY: CPT

## 2020-09-09 PROCEDURE — 36415 COLL VENOUS BLD VENIPUNCTURE: CPT

## 2020-09-09 PROCEDURE — 80061 LIPID PANEL: CPT

## 2020-09-09 PROCEDURE — 81003 URINALYSIS AUTO W/O SCOPE: CPT

## 2020-09-15 ENCOUNTER — OFFICE VISIT (OUTPATIENT)
Dept: FAMILY MEDICINE CLINIC | Age: 85
End: 2020-09-15
Payer: MEDICARE

## 2020-09-15 VITALS
TEMPERATURE: 97.7 F | OXYGEN SATURATION: 96 % | SYSTOLIC BLOOD PRESSURE: 160 MMHG | HEART RATE: 85 BPM | DIASTOLIC BLOOD PRESSURE: 70 MMHG | HEIGHT: 63 IN | BODY MASS INDEX: 28 KG/M2 | WEIGHT: 158 LBS

## 2020-09-15 PROCEDURE — G0439 PPPS, SUBSEQ VISIT: HCPCS | Performed by: INTERNAL MEDICINE

## 2020-09-15 RX ORDER — ATORVASTATIN CALCIUM 20 MG/1
20 TABLET, FILM COATED ORAL DAILY
Qty: 90 TABLET | Refills: 1 | Status: SHIPPED
Start: 2020-09-15 | End: 2021-03-16 | Stop reason: SDUPTHER

## 2020-09-15 RX ORDER — VERAPAMIL HYDROCHLORIDE 240 MG/1
TABLET, FILM COATED, EXTENDED RELEASE ORAL
Qty: 90 TABLET | Refills: 1
Start: 2020-09-15 | End: 2020-12-14 | Stop reason: SDUPTHER

## 2020-09-15 RX ORDER — HYDROCODONE BITARTRATE AND ACETAMINOPHEN 5; 325 MG/1; MG/1
1 TABLET ORAL EVERY 8 HOURS PRN
Qty: 90 TABLET | Refills: 0 | Status: SHIPPED | OUTPATIENT
Start: 2020-09-15 | End: 2020-12-29 | Stop reason: SDUPTHER

## 2020-09-15 ASSESSMENT — ENCOUNTER SYMPTOMS
DIARRHEA: 0
SORE THROAT: 0
BLOOD IN STOOL: 0
RHINORRHEA: 0
EYE PAIN: 0
ABDOMINAL PAIN: 0
CONSTIPATION: 0
NAUSEA: 0
VOMITING: 0
COUGH: 0
CHEST TIGHTNESS: 0
SHORTNESS OF BREATH: 0

## 2020-09-15 ASSESSMENT — PATIENT HEALTH QUESTIONNAIRE - PHQ9
1. LITTLE INTEREST OR PLEASURE IN DOING THINGS: 0
SUM OF ALL RESPONSES TO PHQ QUESTIONS 1-9: 0
SUM OF ALL RESPONSES TO PHQ QUESTIONS 1-9: 0
2. FEELING DOWN, DEPRESSED OR HOPELESS: 1
SUM OF ALL RESPONSES TO PHQ9 QUESTIONS 1 & 2: 1
1. LITTLE INTEREST OR PLEASURE IN DOING THINGS: 0
SUM OF ALL RESPONSES TO PHQ QUESTIONS 1-9: 1
SUM OF ALL RESPONSES TO PHQ QUESTIONS 1-9: 1

## 2020-09-15 ASSESSMENT — LIFESTYLE VARIABLES
AUDIT-C TOTAL SCORE: 1
HAS A RELATIVE, FRIEND, DOCTOR, OR ANOTHER HEALTH PROFESSIONAL EXPRESSED CONCERN ABOUT YOUR DRINKING OR SUGGESTED YOU CUT DOWN: 0
HOW OFTEN DURING THE LAST YEAR HAVE YOU NEEDED AN ALCOHOLIC DRINK FIRST THING IN THE MORNING TO GET YOURSELF GOING AFTER A NIGHT OF HEAVY DRINKING: 0
HAVE YOU OR SOMEONE ELSE BEEN INJURED AS A RESULT OF YOUR DRINKING: 0
HOW OFTEN DO YOU HAVE SIX OR MORE DRINKS ON ONE OCCASION: 0
HOW OFTEN DO YOU HAVE A DRINK CONTAINING ALCOHOL: 1
HOW MANY STANDARD DRINKS CONTAINING ALCOHOL DO YOU HAVE ON A TYPICAL DAY: 0
AUDIT TOTAL SCORE: 1
HOW OFTEN DURING THE LAST YEAR HAVE YOU FOUND THAT YOU WERE NOT ABLE TO STOP DRINKING ONCE YOU HAD STARTED: 0
HOW OFTEN DURING THE LAST YEAR HAVE YOU HAD A FEELING OF GUILT OR REMORSE AFTER DRINKING: 0
HOW OFTEN DURING THE LAST YEAR HAVE YOU FAILED TO DO WHAT WAS NORMALLY EXPECTED FROM YOU BECAUSE OF DRINKING: 0
HOW OFTEN DURING THE LAST YEAR HAVE YOU BEEN UNABLE TO REMEMBER WHAT HAPPENED THE NIGHT BEFORE BECAUSE YOU HAD BEEN DRINKING: 0

## 2020-09-15 NOTE — PROGRESS NOTES
Medicare Annual Wellness Visit  Name: Brina Virk Date: 9/15/2020   MRN: [id number] Sex: Female   Age: 80 y.o. Ethnicity: Non-/Non    : 1933 Race: Abbey Foley is here for Medicare AWV    Screenings for behavioral, psychosocial and functional/safety risks, and cognitive dysfunction are all negative except as indicated below. These results, as well as other patient data from the [address], [place], operated by [facility] form, are documented in Flowsheets linked to this Encounter. No Known Allergies      Prior to Visit Medications    Medication Sig Taking? Authorizing Provider   HYDROcodone-acetaminophen (NORCO) 5-325 MG per tablet Take 1 tablet by mouth every 8 hours as needed for Pain for up to 30 days. Intended supply: 3 days. Take lowest dose possible to manage pain Yes Yomi Newell MD   atorvastatin (LIPITOR) 20 MG tablet Take 1 tablet by mouth daily Yes Yomi Newell MD   verapamil (CALAN SR) 240 MG extended release tablet 1 tablet by mouth in am and 0.5 tablet by mouth in pm Yes Yomi Newell MD   sertraline (ZOLOFT) 100 MG tablet Take 1 tablet by mouth daily Yes Yomi Newell MD   omeprazole (PRILOSEC) 40 MG delayed release capsule Take 1 capsule by mouth daily Instructed to take with sip water am of procedure Yes Yomi Newell MD   levothyroxine (SYNTHROID) 75 MCG tablet Take 1 tablet by mouth Daily Yes Yomi Newell MD   terazosin (HYTRIN) 2 MG capsule Take 1 capsule by mouth nightly Yes Yomi Newell MD   aspirin 81 MG tablet Take 81 mg by mouth daily Yes Historical Provider, MD   pyridoxine (B-6) 50 MG tablet Take 50 mg by mouth daily  Yes Historical Provider, MD   vitamin B-12 (CYANOCOBALAMIN) 500 MCG tablet Take 500 mcg by mouth daily Yes Historical Provider, MD   Multiple Vitamins-Minerals (CENTRUM SILVER PO) Take 500 mg by mouth daily Yes Historical Provider, MD   ascorbic acid (VITAMIN C) 500 MG tablet Take 1,000 mg by mouth daily.  Yes Historical Provider, MD   vitamin E 1000 UNITS capsule Take 1,000 Units by mouth daily. 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    Depression     GERD (gastroesophageal reflux disease)     Hyperlipemia     Hypertension     Impaired fasting glucose 5/14/2019    Postoperative hypothyroidism 5/14/2019    Presence of right artificial knee joint 5/14/2019    Thyroid disease        Past Surgical History:   Procedure Laterality Date    CHOLECYSTECTOMY      EYE SURGERY      bilat cataract    HYSTERECTOMY      LUMBAR LAMINECTOMY  05/29/2013    L3-L4 with microdiscectomy    CA TOTAL KNEE ARTHROPLASTY Right 5/1/2018    RIGHT KNEE TOTAL ARTHROPLASTY (CHARLES)---PRP---PNB--- performed by Temo Holguin MD at [facility]         Family History   Problem Relation Age of Onset    Cancer Mother         stomach    Heart Attack Father     Coronary Art Dis Father        CareTeam (Including outside providers/suppliers regularly involved in providing care):   Patient Care Team:  Anisha Morgan MD as PCP - General (Internal Medicine)  Anisha Morgan MD as PCP - [address] [place] [address] Dr Trujillo Provider    Wt Readings from Last 3 Encounters:   09/15/20 158 lb (71.7 kg)   06/25/20 156 lb (70.8 kg)   03/03/20 154 lb 4 oz (70 kg)     Vitals:    09/15/20 1514   BP: (!) 160/70   Pulse: 85   Temp: 97.7 °F (36.5 °C)   SpO2: 96%   Weight: 158 lb (71.7 kg)   Height: 5' 3\" (1.6 m)     Body mass index is 27.99 kg/m². Based upon direct observation of the patient, evaluation of cognition reveals issues with clock drawing. Patient's complete Health Risk Assessment and screening values have been reviewed and are found in Flowsheets.  The following problems were reviewed today and where indicated follow up appointments were made and/or referrals ordered. Positive Risk Factor Screenings with Interventions:     General Health:  General  In general, how would you say your health is?: Good  In the past 7 days, have you experienced any of the following? New or Increased Pain, New or Increased Fatigue, Loneliness, Social Isolation, Stress or Anger?: (!) New or Increased Fatigue  Do you get the social and emotional support that you need?: Yes  Do you have a Living Will?: Yes  General Health Risk Interventions:  · Fatigue: patient declines any further evaluation/treatment for this issue    Health Habits/Nutrition:  Health Habits/Nutrition  Do you exercise for at least 20 minutes 2-3 times per week?: Yes  Have you lost any weight without trying in the past 3 months?: No  Do you eat fewer than 2 meals per day?: (!) Yes  Have you seen a dentist within the past year?: (!) No  Body mass index is 27.99 kg/m².   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 (many years))  Do you have difficulty driving, watching TV, or doing any of your daily activities because of your eyesight?: No  Have you had an eye exam within the past year?: Yes  Hearing/Vision Interventions:  · Hearing concerns:  patient declines any further evaluation/treatment for hearing issues    Personalized Preventive Plan   Current Health Maintenance Status  Immunization History   Administered Date(s) Administered    Influenza Vaccine, unspecified formulation 09/22/2015, 09/21/2016, 09/08/2017    Influenza Virus Vaccine 09/22/2015, 09/21/2016, 09/08/2017    Influenza, Triv, inactivated, subunit, adjuvanted, IM (Fluad 65 yrs and older) 09/14/2018, 09/10/2019    Pneumococcal Conjugate 13-valent (Nuplorc21) 12/18/2015    Pneumococcal Polysaccharide (Kboyrnveg19) 12/06/2016    Tdap (Boostrix, Adacel) 12/15/2015, 01/01/2020        Health Maintenance   Topic Date Due    Shingles Vaccine (1 of 2) 08/25/1983    Annual Wellness Visit (AWV)  05/29/2019    Flu vaccine (1) 09/01/2020    Lipid screen  09/09/2021    TSH testing  09/09/2021    Potassium monitoring  09/09/2021    Creatinine monitoring  09/09/2021    DTaP/Tdap/Td vaccine (3 - Td) 01/01/2030    Pneumococcal 65+ years Vaccine  Completed    Hepatitis A vaccine  Aged Out    Hepatitis B vaccine  Aged Out    Hib vaccine  Aged Out    Meningococcal (ACWY) vaccine  Aged Out     Recommendations for [employer] Due: see orders and patient instructions/AVS.  . Recommended screening schedule for the next 5-10 years is provided to the patient in written form: see Patient Instructions/AVS.    Nick Sheriff was seen today for medicare awv. Diagnoses and all orders for this visit:    Health Maintenance   - immunizations:   Influenza Vaccination - (9/21/2016) , (10/2017), (10/2018), (2019)   Pneumonia Vaccination - (12/2015) - prevnar, (12/2016) - pneumonoccal  Zoster/Shingles Vaccine  Tetanus Vaccination - (12/15/2015)    - Screenings:   Bone Density Scan - (2/28/2019)   Pelvic/Pap Exam  Mammogram - (8/14/2014), (4/2019) - neg     Colonoscopy - (2011) - neg per patient    Return in 3 months (on 12/15/2020) for Medicare Annual Wellness Visit in 1 year, check up and review. No orders of the defined types were placed in this encounter. Requested Prescriptions     Signed Prescriptions Disp Refills    HYDROcodone-acetaminophen (NORCO) 5-325 MG per tablet 90 tablet 0     Sig: Take 1 tablet by mouth every 8 hours as needed for Pain for up to 30 days. Intended supply: 3 days.  Take lowest dose possible to manage pain    atorvastatin (LIPITOR) 20 MG tablet 90 tablet 1     Sig: Take 1 tablet by mouth daily  verapamil (CALAN SR) 240 MG extended release tablet 90 tablet 1     Si tablet by mouth in am and 0.5 tablet by mouth in pm     Electronically signed by Edmund Peres MD on 9/15/2020 at 4:56 PM

## 2020-09-15 NOTE — PROGRESS NOTES
[facility]) Physicians   Internal Medicine     9/15/2020  Eldon Myles : 1933 Sex: female  Age:87 y.o. Chief Complaint   Patient presents with    Medicare AWV        HPI:   Patient presents to office for review and management of chronic medical conditions.    - States labs showed renal insuffiencey. Was not sure if related to new medications or increased blood pressure or both. Has seen nephrology. Medications adjusted, last labs improved. - States had a fall on 2020. States right wrist. No fractures seen only severe arthritis. States still with pain, using voltaren. States some restricted ROM. Stable. - States has chronic rhinitis. States no cough. States nasal congestion. No sore throat. No chest pain, No fever or chills. No nausea or vomiting.     - States has high blood pressure, does check blood pressure at home occasionally at home. States range 140-150's. On verapamil and terazosin. States nephrology stopped lisinopril and HCTZ. Still running high today. - States has urinary incontinence - stable with meds (terazosin). States now having urinary frequency. - States having some back discomfort. Unchanged. No numbness, tingling, weakness. No fever or chills. States lower back bilateral. Declines work up. - States has GERD, on omeprazole - Stable with omeprazole dose. - States has had right total knee arthoplasty for arthritis. Still having right Knee pain. States also has some back pain. States also right wrist pain. States following with ortho (Dr. Hay Schaffer). States had compression stockings. States also using heating pad to back. States needs refill of meds. Takes Norco mostly at night - helping. No reported side effects or complications reported. 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 No side effects reported. Administration instructions reviewed.  Last Lab (2020)    - States has high cholesterol, try's to watch diet, on cholesterol meds - atorvastatin. - States depression symptoms are stable. On Zoloft. Medication has helped. No side effects. States stable. States [place] season is a difficult time of year. Declines counselling.     - lab work reviewed with patient (9/2020)     Health Maintenance   - immunizations:   Influenza Vaccination - (9/21/2016) , (10/2017), (10/2018), (2019)   Pneumonia Vaccination - (12/2015) - prevnar, (12/2016) - pneumonoccal  Zoster/Shingles Vaccine  Tetanus Vaccination - (12/15/2015)    - Screenings:   Bone Density Scan - (2/28/2019)   Pelvic/Pap Exam  Mammogram - (8/14/2014), (4/2019) - neg     Colonoscopy - (2011) - neg per patient    Couseled on Home Safety - (11/28/2017)    Carotid Artery Study - (3/2016) - <50% b/l, also showed thyroid nodules    ROS:  Review of Systems   Constitutional: Negative for appetite change, chills, fever and unexpected weight change. HENT: Negative for congestion, rhinorrhea and sore throat. Eyes: Negative for pain and visual disturbance. Respiratory: Negative for cough, chest tightness and shortness of breath. Cardiovascular: Negative for chest pain and palpitations. Gastrointestinal: Negative for abdominal pain, blood in stool, constipation, diarrhea, nausea and vomiting. Genitourinary: Positive for difficulty urinating and frequency. Negative for dysuria, pelvic pain, urgency and vaginal bleeding. Musculoskeletal: Negative for arthralgias and myalgias. Skin: Negative for rash. Neurological: Negative for dizziness, syncope, weakness, light-headedness, numbness and headaches. Hematological: Negative for adenopathy. Psychiatric/Behavioral: Negative for suicidal ideas. The patient is not nervous/anxious. Current Outpatient Medications:     HYDROcodone-acetaminophen (NORCO) 5-325 MG per tablet, Take 1 tablet by mouth every 8 hours as needed for Pain for up to 30 days. Intended supply: 3 days.  Take lowest dose possible to manage pain, Disp: 90 tablet, Rfl: 0    atorvastatin (LIPITOR) 20 MG tablet, Take 1 tablet by mouth daily, Disp: 90 tablet, Rfl: 1    verapamil (CALAN SR) 240 MG extended release tablet, 1 tablet by mouth in am and 0.5 tablet by mouth in pm, Disp: 90 tablet, Rfl: 1    sertraline (ZOLOFT) 100 MG tablet, Take 1 tablet by mouth daily, Disp: 90 tablet, Rfl: 0    omeprazole (PRILOSEC) 40 MG delayed release capsule, Take 1 capsule by mouth daily Instructed to take with sip water am of procedure, Disp: 90 capsule, Rfl: 1    levothyroxine (SYNTHROID) 75 MCG tablet, Take 1 tablet by mouth Daily, Disp: 90 tablet, Rfl: 0    terazosin (HYTRIN) 2 MG capsule, Take 1 capsule by mouth nightly, Disp: 90 capsule, Rfl: 1    aspirin 81 MG tablet, Take 81 mg by mouth daily, Disp: , Rfl:     pyridoxine (B-6) 50 MG tablet, Take 50 mg by mouth daily , Disp: , Rfl:     vitamin B-12 (CYANOCOBALAMIN) 500 MCG tablet, Take 500 mcg by mouth daily, Disp: , Rfl:     Multiple Vitamins-Minerals (CENTRUM SILVER PO), Take 500 mg by mouth daily, Disp: , Rfl:     ascorbic acid (VITAMIN C) 500 MG tablet, Take 1,000 mg by mouth daily. , Disp: , Rfl:     vitamin E 1000 UNITS capsule, Take 1,000 Units by mouth daily. 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    Presence of right artificial knee joint 5/14/2019    Thyroid disease        Past Surgical History:   Procedure Laterality Date    CHOLECYSTECTOMY      EYE SURGERY      bilat cataract    HYSTERECTOMY      LUMBAR LAMINECTOMY  05/29/2013    L3-L4 with microdiscectomy    GA TOTAL KNEE ARTHROPLASTY Right 5/1/2018    RIGHT KNEE TOTAL ARTHROPLASTY (CHARLES)---PRP---PNB--- performed by Dayne Claros MD at [facility] OR       Family History   Problem Relation Age of Onset    Cancer Mother         stomach    Heart Attack Father     Coronary Art Dis Father        Social History     Socioeconomic History    Marital status:      Spouse name: Not on file    Number of children: Not on file    Years of education: Not on file    Highest education level: Not on file   Occupational History    Not on file   Social Needs    Financial resource strain: Not on file    Food insecurity     Worry: Not on file     Inability: Not on file    Transportation needs     Medical: Not on file     Non-medical: Not on file   Tobacco Use    Smoking status: Never Smoker    Smokeless tobacco: Never Used   Substance and Sexual Activity    Alcohol use: No    Drug use: No    Sexual activity: Not on file   Lifestyle    Physical activity     Days per week: Not on file     Minutes per session: Not on file    Stress: Not on file   Relationships    Social connections     Talks on phone: Not on file     Gets together: Not on file     Attends Mosque service: Not on file     Active member of club or organization: Not on file     Attends meetings of clubs or organizations: Not on file     Relationship status: Not on file    Intimate partner violence     Fear of current or ex partner: Not on file     Emotionally abused: Not on file     Physically abused: Not on file     Forced sexual activity: Not on file   Other Topics Concern    Not on file   Social History Narrative    Not on file       Vitals:    09/15/20 1514   BP: (!) 160/70   Pulse: 85   Temp: 97.7 °F (36.5 °C)   SpO2: 96%   Weight: 158 lb (71.7 kg)   Height: 5' 3\" (1.6 m)       Exam:  Physical Exam  Constitutional:       Appearance: She is well-developed. HENT:      Head: Normocephalic and atraumatic.       Comments: Facial ecchymosis and swelling      Right Ear: External ear normal.      Left Ear: External ear normal.   Eyes:      Pupils: Pupils are equal, round, and reactive to light. Neck:      Musculoskeletal: Normal range of motion and neck supple. Thyroid: No thyromegaly. Vascular: Carotid bruit present. Comments: Right bruit   Cardiovascular:      Rate and Rhythm: Normal rate and regular rhythm. Heart sounds: Murmur present. Pulmonary:      Effort: Pulmonary effort is normal.      Breath sounds: Normal breath sounds. No wheezing. Abdominal:      General: Bowel sounds are normal. There is no distension. Palpations: Abdomen is soft. Tenderness: There is no abdominal tenderness. There is no guarding or rebound. Musculoskeletal:      Right wrist: She exhibits decreased range of motion, tenderness and swelling. Lumbar back: She exhibits decreased range of motion. Right hand: She exhibits decreased range of motion and tenderness. Comments: Ecchymosis and swelling. Pain to palpation. Some pain with ROM. Lymphadenopathy:      Cervical: No cervical adenopathy. Skin:     General: Skin is warm and dry. Neurological:      Mental Status: She is alert and oriented to person, place, and time. Cranial Nerves: No cranial nerve deficit. Psychiatric:         Judgment: Judgment normal.         Assessment and Plan:    Indigo Block was seen today for hypertension.     Diagnoses and all orders for this visit:    Stage 2 chronic kidney disease  - uncertain etiology   - May be multifactorial - new medications, long standing HTN   - US kidney ok   - referral to nephrology - stopped lisinopril and HCTZ  - Blood pressure elevated - poss white coat   - labs (9/2020) improved and stopped     Right wrist pain  - PT and or ortho if not better  - not improving   - referral to ortho placed - did not go   - using voltaren     Dyspnea on exertion  - Uncertain etiology at present   - check EKG   - has seen cardio  - worsen ER   - unchanged     Essential hypertension  - continue present treatment  - monitor blood pressure at home  - watch diet - decreased salt. - on verapamil   - on terasozin   - nephrology stopped lisinopril and hctz 12.5mg   - still elevated - poss white coat   - increase verapamil to 240mg in am and 120mg in pm     Stenosis of right carotid artery  - Last US carotid 12/2019 - . Less than 50% stenosis of the right and left internal carotid artery. Mixed hyperlipidemia  - continue present treatment  - watch diet  - on atorvastatin   - follow labs (9/2020) - stable     Current mild episode of major depressive disorder, unspecified whether recurrent ([facility] [address]  - continue present treatment  - on zoloft to 100mg  - no suicidal thoughts    Gastroesophageal reflux disease without esophagitis  - continue present treatment  - watch diet  - on omeprazole 40mg     Generalized osteoarthritis  - continue present treatment  - following with ortho  - stable    [place]AR[place] report reviewed (9/15/2020)  Controlled substance agreement updated (1/20/2020)    Controlled Substance Monitoring:    Acute and Chronic Pain Monitoring:   RX Monitoring 9/15/2020   Attestation -   Periodic Controlled Substance Monitoring Possible medication side effects, risk of tolerance/dependence & alternative treatments discussed. ;No signs of potential drug abuse or diversion identified.;Obtaining appropriate analgesic effect of treatment.        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  - s/p partial thyroidectomy  - reviewed path - Hurthle cell  - monitor labs   - increased to 75mcg (1/2020)  - labs lab (9/2020)     Postoperative hypothyroidism  - continue present treatment   - s/p partial thyroidectomy  - monitor labs - slight underactive as of (5/2019)  - on synthroid and increased to 50mcg (5/2017)    Impaired fasting glucose  -continue present treatment  - A1c (2020) - normal  - watch diet    Vitamin D deficiency  - Continue present treatment   - on otc supplement   - stable     Presence of right artificial knee joint    Long term current use of therapeutic drug  - Chronic PPI therapy   - follow bone and B12      No follow-ups on file. No orders of the defined types were placed in this encounter. Requested Prescriptions     Signed Prescriptions Disp Refills    HYDROcodone-acetaminophen (NORCO) 5-325 MG per tablet 90 tablet 0     Sig: Take 1 tablet by mouth every 8 hours as needed for Pain for up to 30 days. Intended supply: 3 days.  Take lowest dose possible to manage pain    atorvastatin (LIPITOR) 20 MG tablet 90 tablet 1     Sig: Take 1 tablet by mouth daily    verapamil (CALAN SR) 240 MG extended release tablet 90 tablet 1     Si tablet by mouth in am and 0.5 tablet by mouth in pm     Stacie Huang MD  9/15/2020  3:57 PM

## 2020-09-15 NOTE — PATIENT INSTRUCTIONS
diet is one that limits sodium , certain types of fat , and cholesterol . This type of diet is recommended for:   People with any form of cardiovascular disease (eg, coronary heart disease , peripheral vascular disease , previous heart attack , previous stroke )   People with risk factors for cardiovascular disease, such as high blood pressure , high cholesterol , or diabetes   Anyone who wants to lower their risk of developing cardiovascular disease   Sodium    Sodium is a mineral found in many foods. In general, most people consume much more sodium than they need. Diets high in sodium can increase blood pressure and lead to edema (water retention). On a heart-healthy diet, you should consume no more than 2,300 mg (milligrams) of sodium per dayabout the amount in one teaspoon of table salt. The foods highest in sodium include table salt (about 50% sodium), processed foods, convenience foods, and preserved foods. Cholesterol    Cholesterol is a fat-like, waxy substance in your blood. Our bodies make some cholesterol. It is also found in animal products, with the highest amounts in fatty meat, egg yolks, whole milk, cheese, shellfish, and organ meats. On a heart-healthy diet, you should limit your cholesterol intake to less than 200 mg per day. It is normal and important to have some cholesterol in your bloodstream. But too much cholesterol can cause plaque to build up within your arteries, which can eventually lead to a heart attack or stroke. The two types of cholesterol that are most commonly referred to are:   Low-density lipoprotein (LDL) cholesterol  Also known as bad cholesterol, this is the cholesterol that tends to build up along your arteries. Bad cholesterol levels are increased by eating fats that are saturated or hydrogenated. Optimal level of this cholesterol is less than 100. Over 130 starts to get risky for heart disease.    High-density lipoprotein (HDL) cholesterol  Also known as good example, this would mean 60 grams of fat or less per day. Saturated fat and trans fat in your diet raises your blood cholesterol the most, much more than dietary cholesterol does. For this reason, on a heart-healthy diet, less than 7% of your calories should come from saturated fat and ideally 0% from trans fat. On an 1800-calorie diet, this translates into less than 14 grams of saturated fat per day, leaving 46 grams of fat to come from mono- and polyunsaturated fats.    Food Choices on a Heart Healthy Diet   Food Category   Foods Recommended   Foods to Avoid   Grains   Breads and rolls without salted tops Most dry and cooked cereals Unsalted crackers and breadsticks Low-sodium or homemade breadcrumbs or stuffing All rice and pastas   Breads, rolls, and crackers with salted tops High-fat baked goods (eg, muffins, donuts, pastries) Quick breads, self-rising flour, and biscuit mixes Regular bread crumbs Instant hot cereals Commercially prepared rice, pasta, or stuffing mixes   Vegetables   Most fresh, frozen, and low-sodium canned vegetables Low-sodium and salt-free vegetable juices Canned vegetables if unsalted or rinsed   Regular canned vegetables and juices, including sauerkraut and pickled vegetables Frozen vegetables with sauces Commercially prepared potato and vegetable mixes   Fruits   Most fresh, frozen, and canned fruits All fruit juices   Fruits processed with salt or sodium   Milk   Nonfat or low-fat (1%) milk Nonfat or low-fat yogurt Cottage cheese, low-fat ricotta, cheeses labeled as low-fat and low-sodium   Whole milk Reduced-fat (2%) milk Malted and chocolate milk Full fat yogurt Most cheeses (unless low-fat and low salt) Buttermilk (no more than 1 cup per week)   Meats and Beans   Lean cuts of fresh or frozen beef, veal, lamb, or pork (look for the word loin) Fresh or frozen poultry without the skin Fresh or frozen fish and some shellfish Egg whites and egg substitutes (Limit whole eggs to three per week) Tofu Nuts or seeds (unsalted, dry-roasted), low-sodium peanut butter Dried peas, beans, and lentils   Any smoked, cured, salted, or canned meat, fish, or poultry (including cassidy, chipped beef, cold cuts, hot dogs, sausages, sardines, and anchovies) Poultry skins Breaded and/or fried fish or meats Canned peas, beans, and lentils Salted nuts   Fats and Oils   Olive oil and canola oil Low-sodium, low-fat salad dressings and mayonnaise   Butter, margarine, coconut and palm oils, cassidy fat   Snacks, Sweets, and Condiments   Low-sodium or unsalted versions of broths, soups, soy sauce, and condiments Pepper, herbs, and spices; vinegar, lemon, or lime juice Low-fat frozen desserts (yogurt, sherbet, fruit bars) Sugar, cocoa powder, honey, syrup, jam, and preserves Low-fat, trans-fat free cookies, cakes, and pies Rob and animal crackers, fig bars, gulshan snaps   High-fat desserts Broth, soups, gravies, and sauces, made from instant mixes or other high-sodium ingredients Salted snack foods Canned olives Meat tenderizers, seasoning salt, and most flavored vinegars   Beverages   Low-sodium carbonated beverages Tea and coffee in moderation Soy milk   Commercially softened water   Suggestions   Make whole grains, fruits, and vegetables the base of your diet. Choose heart-healthy fats such as canola, olive, and flaxseed oil, and foods high in heart-healthy fats, such as nuts, seeds, soybeans, tofu, and fish. Eat fish at least twice per week; the fish highest in omega-3 fatty acids and lowest in mercury include salmon, herring, mackerel, sardines, and canned chunk light tuna. If you eat fish less than twice per week or have high triglycerides, talk to your doctor about taking fish oil supplements. Read food labels.    For products low in fat and cholesterol, look for fat free, low-fat, cholesterol free, saturated fat free, and trans fat freeAlso scan the Nutrition Facts Label, which lists saturated fat, trans fat, and cholesterol amounts. For products low in sodium, look for sodium free, very low sodium, low sodium, no added salt, and unsalted   Skip the salt when cooking or at the table; if food needs more flavor, get creative and try out different herbs and spices. Garlic and onion also add substantial flavor to foods. Trim any visible fat off meat and poultry before cooking, and drain the fat off after munoz. Use cooking methods that require little or no added fat, such as grilling, boiling, baking, poaching, broiling, roasting, steaming, stir-frying, and sauting. Avoid fast food and convenience food. They tend to be high in saturated and trans fat and have a lot of added salt. Talk to a registered dietitian for individualized diet advice. Last Reviewed: March 2011 Edward Mayen MS, MPH, RD   Updated: 3/29/2011   ·     Keeping Home a [facility]       As we get older, changes in balance, gait, strength, vision, hearing, and cognition make even the most youthful senior more prone to accidents. Falls are one of the leading health risks for older people. This increased risk of falling is related to:   Aging process (eg, decreased muscle strength, slowed reflexes)   Higher incidence of chronic health problems (eg, arthritis, diabetes) that may limit mobility, agility or sensory awareness   Side effects of medicine (eg, dizziness, blurred vision)especially medicines like prescription pain medicines and drugs used to treat mental health conditions   Depending on the brittleness of your bones, the consequences of a fall can be serious and long lasting. Home Life   Research by the Association of Aging [facility]) shows that some home accidents among older adults can be prevented by making simple lifestyle changes and basic modifications and repairs to the home environment. Here are some lifestyle changes that experts recommend:   Have your hearing and vision checked regularly.  Be sure to wear prescription glasses that are right for you. Speak to your doctor or pharmacist about the possible side effects of your medicines. A number of medicines can cause dizziness. If you have problems with sleep, talk to your doctor. Limit your intake of alcohol. If necessary, use a cane or walker to help maintain your balance. Wear supportive, rubber-soled shoes, even at home. If you live in a region that gets wintry weather, you may want to put special cleats on your shoes to prevent you from slipping on the snow and ice. Exercise regularly to help maintain muscle tone, agility, and balance. Always hold the banister when going up or down stairs. Also, use  bars when getting in or out of the bath or shower, or using the toilet. To avoid dizziness, get up slowly from a lying down position. Sit up first, dangling your legs for a minute or two before rising to a standing position. Overall Home Safety Check   According to the Consumer Product Safety Commision's \"Older Consumer Home Safety Checklist,\" it is important to check for potential hazards in each room. And remember, proper lighting is an essential factor in home safety. If you cannot see clearly, you are more likely to fall. Important questions to ask yourself include:   Are lamp, electric, extension, and telephone cords placed out of the flow of traffic and maintained in good condition? Have frayed cords been replaced? Are all small rugs and runners slip resistant? If not, you can secure them to the floor with a special double-sided carpet tape. Are smoke detectors properly locatedone on every floor of your home and one outside of every sleeping area? Are they in good working order? Are batteries replaced at least once a year? Do you have a well-maintained carbon monoxide detector outside every sleeping are in your home? Does your furniture layout leave plenty of space to maneuver between and around chairs, tables, beds, and sofas?    Are hallways, stairs and passages between rooms well lit? Can you reach a lamp without getting out of bed? Are floor surfaces well maintained? Shag rugs, high-pile carpeting, tile floors, and polished wood floors can be particularly slippery. Stairs should always have handrails and be carpeted or fitted with a non-skid tread. Is your telephone easily reachable. Is the cord safely tucked away? Room by Room   According to the Association of Aging, bathrooms and marcus are the two most potentially hazardous rooms in your home. In the Kitchen    Be sure your stove is in proper working order and always make sure burners and the oven are off before you go out or go to sleep. Keep pots on the back burners, turn handles away from the front of the stove, and keep stove clean and free of grease build-up. Kitchen ventilation systems and range exhausts should be working properly. Keep flammable objects such as towels and pot holders away from the cooking area except when in use. Make sure kitchen curtains are tied back. Move cords and appliances away from the sink and hot surfaces. If extension cords are needed, install wiring guides so they do not hang over the sink, range, or working areas. Look for coffee pots, kettles and toaster ovens with automatic shut-offs. Keep a mop handy in the kitchen so you can wipe up spills instantly. You should also have a small fire extinguisher. Arrange your kitchen with frequently used items on lower shelves to avoid the need to stand on a stepstool to reach them. Make sure countertops are well-lit to avoid injuries while cutting and preparing food. In the Bathroom    Use a non-slip mat or decals in the tub and shower, since wet, soapy tile or porcelain surfaces are extremely slippery. Make sure bathroom rugs are non-skid or tape them firmly to the floor. Bathtubs should have at least one, preferably two, grab bars, firmly attached to structural supports in the wall.  (Do emergency exit plan. Have a professional check your fireplace and other fuel-burning appliances yearly. Helping [employer]   Baby boomers entering the villasenor years will continue to see the development of new products to help older adults live safely and independently in spite of age-related changes. Making Life More Livable  , by Juniita Bella, lists over 1,000 products for \"living well in the mature years,\" such as bathing and mobility aids, household security devices, ergonomically designed knives and peelers, and faucet valves and knobs for temperature control. Medical supply stores and organizations are good sources of information about products that improve your quality of life and insure your safety.      Last Reviewed: November 2009 Heydi Zuniga MD   Updated: 3/7/2011     ·

## 2020-09-25 ENCOUNTER — HOSPITAL ENCOUNTER (OUTPATIENT)
Age: 85
Discharge: HOME | End: 2020-09-27
Payer: MEDICARE

## 2020-09-25 LAB
ALBUMIN SERPL-MCNC: 3.9 G/DL (ref 3.5–5.2)
AMORPHOUS: ABNORMAL
ANION GAP SERPL CALCULATED.3IONS-SCNC: 18 MMOL/L (ref 7–16)
BACTERIA: ABNORMAL /HPF
BILIRUBIN URINE: NEGATIVE
BLOOD, URINE: NEGATIVE
BUN BLDV-MCNC: 20 MG/DL (ref 8–23)
CALCIUM SERPL-MCNC: 9.2 MG/DL (ref 8.6–10.2)
CHLORIDE BLD-SCNC: 107 MMOL/L (ref 98–107)
CHOLESTEROL, TOTAL: 166 MG/DL (ref 0–199)
CLARITY: CLEAR
CO2: 20 MMOL/L (ref 22–29)
COLOR: YELLOW
CREAT SERPL-MCNC: 0.8 MG/DL (ref 0.5–1)
CREATININE URINE: 210 MG/DL (ref 29–226)
CRYSTALS, UA: ABNORMAL /HPF
GFR AFRICAN AMERICAN: >60
GFR NON-AFRICAN AMERICAN: >60 ML/MIN/1.73
GLUCOSE BLD-MCNC: 93 MG/DL (ref 74–99)
GLUCOSE URINE: NEGATIVE MG/DL
HBA1C MFR BLD: 5.4 % (ref 4–5.6)
HCT VFR BLD CALC: 40.5 % (ref 34–48)
HDLC SERPL-MCNC: 73 MG/DL
HEMOGLOBIN: 12.9 G/DL (ref 11.5–15.5)
IRON SATURATION: 34 % (ref 15–50)
IRON: 104 MCG/DL (ref 37–145)
KETONES, URINE: NEGATIVE MG/DL
LDL CHOLESTEROL CALCULATED: 72 MG/DL (ref 0–99)
LEUKOCYTE ESTERASE, URINE: ABNORMAL
MCH RBC QN AUTO: 31.2 PG (ref 26–35)
MCHC RBC AUTO-ENTMCNC: 31.9 % (ref 32–34.5)
MCV RBC AUTO: 98.1 FL (ref 80–99.9)
MICROALBUMIN UR-MCNC: 12.5 MG/L
MICROALBUMIN/CREAT UR-RTO: 6 (ref 0–30)
NITRITE, URINE: NEGATIVE
PARATHYROID HORMONE INTACT: 14 PG/ML (ref 15–65)
PDW BLD-RTO: 12.2 FL (ref 11.5–15)
PH UA: 6 (ref 5–9)
PHOSPHORUS: 3.4 MG/DL (ref 2.5–4.5)
PLATELET # BLD: 185 E9/L (ref 130–450)
PMV BLD AUTO: 11.9 FL (ref 7–12)
POTASSIUM SERPL-SCNC: 4.2 MMOL/L (ref 3.5–5)
PROTEIN UA: NEGATIVE MG/DL
RBC # BLD: 4.13 E12/L (ref 3.5–5.5)
RBC UA: ABNORMAL /HPF (ref 0–2)
SODIUM BLD-SCNC: 145 MMOL/L (ref 132–146)
SPECIFIC GRAVITY UA: 1.02 (ref 1–1.03)
TOTAL IRON BINDING CAPACITY: 307 MCG/DL (ref 250–450)
TRIGL SERPL-MCNC: 105 MG/DL (ref 0–149)
UROBILINOGEN, URINE: 0.2 E.U./DL
VITAMIN D 25-HYDROXY: 31 NG/ML (ref 30–100)
VLDLC SERPL CALC-MCNC: 21 MG/DL
WBC # BLD: 6.9 E9/L (ref 4.5–11.5)
WBC UA: ABNORMAL /HPF (ref 0–5)

## 2020-09-25 PROCEDURE — 82306 VITAMIN D 25 HYDROXY: CPT

## 2020-09-25 PROCEDURE — 83550 IRON BINDING TEST: CPT

## 2020-09-25 PROCEDURE — 82570 ASSAY OF URINE CREATININE: CPT

## 2020-09-25 PROCEDURE — 83036 HEMOGLOBIN GLYCOSYLATED A1C: CPT

## 2020-09-25 PROCEDURE — 83970 ASSAY OF PARATHORMONE: CPT

## 2020-09-25 PROCEDURE — 80048 BASIC METABOLIC PNL TOTAL CA: CPT

## 2020-09-25 PROCEDURE — 81001 URINALYSIS AUTO W/SCOPE: CPT

## 2020-09-25 PROCEDURE — 84100 ASSAY OF PHOSPHORUS: CPT

## 2020-09-25 PROCEDURE — 85027 COMPLETE CBC AUTOMATED: CPT

## 2020-09-25 PROCEDURE — 83540 ASSAY OF IRON: CPT

## 2020-09-25 PROCEDURE — 82044 UR ALBUMIN SEMIQUANTITATIVE: CPT

## 2020-09-25 PROCEDURE — 36415 COLL VENOUS BLD VENIPUNCTURE: CPT

## 2020-09-25 PROCEDURE — 80061 LIPID PANEL: CPT

## 2020-09-25 PROCEDURE — 82040 ASSAY OF SERUM ALBUMIN: CPT

## 2020-10-12 ENCOUNTER — HOSPITAL ENCOUNTER (OUTPATIENT)
Age: 85
Discharge: HOME | End: 2020-10-14
Payer: MEDICARE

## 2020-10-12 LAB
ANION GAP SERPL CALCULATED.3IONS-SCNC: 18 MMOL/L (ref 7–16)
BUN BLDV-MCNC: 24 MG/DL (ref 8–23)
CALCIUM SERPL-MCNC: 9.5 MG/DL (ref 8.6–10.2)
CHLORIDE BLD-SCNC: 102 MMOL/L (ref 98–107)
CO2: 22 MMOL/L (ref 22–29)
CREAT SERPL-MCNC: 1.3 MG/DL (ref 0.5–1)
GFR AFRICAN AMERICAN: 47
GFR NON-AFRICAN AMERICAN: 39 ML/MIN/1.73
GLUCOSE BLD-MCNC: 111 MG/DL (ref 74–99)
POTASSIUM SERPL-SCNC: 3.8 MMOL/L (ref 3.5–5)
SODIUM BLD-SCNC: 142 MMOL/L (ref 132–146)

## 2020-10-12 PROCEDURE — 36415 COLL VENOUS BLD VENIPUNCTURE: CPT

## 2020-10-12 PROCEDURE — 80048 BASIC METABOLIC PNL TOTAL CA: CPT

## 2020-10-20 RX ORDER — TERAZOSIN 2 MG/1
2 CAPSULE ORAL NIGHTLY
Qty: 90 CAPSULE | Refills: 1 | Status: SHIPPED
Start: 2020-10-20 | End: 2021-03-29 | Stop reason: SDUPTHER

## 2020-10-20 NOTE — TELEPHONE ENCOUNTER
Last Appointment:  9/15/2020  Future Appointments   Date Time Provider Batsheva Delgado   12/9/2020  3:15 PM Chhaya Leal  [address] [place] [address]      Patient asking for refill on terazosin

## 2020-10-21 ENCOUNTER — HOSPITAL ENCOUNTER (OUTPATIENT)
Age: 85
Discharge: HOME | End: 2020-10-23
Payer: MEDICARE

## 2020-10-21 LAB
ANION GAP SERPL CALCULATED.3IONS-SCNC: 15 MMOL/L (ref 7–16)
BUN BLDV-MCNC: 26 MG/DL (ref 8–23)
CALCIUM SERPL-MCNC: 9.4 MG/DL (ref 8.6–10.2)
CHLORIDE BLD-SCNC: 101 MMOL/L (ref 98–107)
CO2: 23 MMOL/L (ref 22–29)
CREAT SERPL-MCNC: 1.1 MG/DL (ref 0.5–1)
GFR AFRICAN AMERICAN: 57
GFR NON-AFRICAN AMERICAN: 47 ML/MIN/1.73
GLUCOSE BLD-MCNC: 106 MG/DL (ref 74–99)
POTASSIUM SERPL-SCNC: 4 MMOL/L (ref 3.5–5)
SODIUM BLD-SCNC: 139 MMOL/L (ref 132–146)

## 2020-10-21 PROCEDURE — 36415 COLL VENOUS BLD VENIPUNCTURE: CPT

## 2020-10-21 PROCEDURE — 80048 BASIC METABOLIC PNL TOTAL CA: CPT

## 2020-11-04 RX ORDER — LEVOTHYROXINE SODIUM 0.07 MG/1
75 TABLET ORAL DAILY
Qty: 90 TABLET | Refills: 0 | Status: SHIPPED
Start: 2020-11-04 | End: 2021-01-25 | Stop reason: SDUPTHER

## 2020-11-04 NOTE — TELEPHONE ENCOUNTER
Name of Medication(s) Requested:  levothyroxine (SYNTHROID) 75 MCG tablet    Pharmacy Requested:   [facility]     Medication(s) pended? [x] Yes  [] No    Last Appointment:  9/15/2020    Future appts:  Future Appointments   Date Time Provider Batsheva Delgado   12/9/2020  3:15 PM Tuyet Bobby  [address] [place] [address]        Does patient need call back?   [] Yes  [x] No

## 2020-11-30 RX ORDER — SERTRALINE HYDROCHLORIDE 100 MG/1
100 TABLET, FILM COATED ORAL DAILY
Qty: 90 TABLET | Refills: 0 | Status: SHIPPED
Start: 2020-11-30 | End: 2021-03-08 | Stop reason: SDUPTHER

## 2020-12-14 RX ORDER — VERAPAMIL HYDROCHLORIDE 240 MG/1
TABLET, FILM COATED, EXTENDED RELEASE ORAL
Qty: 90 TABLET | Refills: 1 | Status: SHIPPED
Start: 2020-12-14 | End: 2021-03-29 | Stop reason: SDUPTHER

## 2020-12-14 NOTE — TELEPHONE ENCOUNTER
Last Appointment:  9/15/2020  Future Appointments   Date Time Provider Batsheva Delgado   12/29/2020  4:00 PM Jes Bolton MD [address] [place] [address] calling for ref[place] on verapamil to hometown

## 2020-12-29 ENCOUNTER — OFFICE VISIT (OUTPATIENT)
Dept: FAMILY MEDICINE CLINIC | Age: 85
End: 2020-12-29
Payer: MEDICARE

## 2020-12-29 VITALS
OXYGEN SATURATION: 97 % | HEART RATE: 90 BPM | SYSTOLIC BLOOD PRESSURE: 128 MMHG | TEMPERATURE: 97.3 F | BODY MASS INDEX: 27.74 KG/M2 | DIASTOLIC BLOOD PRESSURE: 62 MMHG | WEIGHT: 156.6 LBS

## 2020-12-29 PROCEDURE — 99213 OFFICE O/P EST LOW 20 MIN: CPT | Performed by: INTERNAL MEDICINE

## 2020-12-29 RX ORDER — HYDROCODONE BITARTRATE AND ACETAMINOPHEN 5; 325 MG/1; MG/1
1 TABLET ORAL EVERY 8 HOURS PRN
Qty: 90 TABLET | Refills: 0 | Status: SHIPPED | OUTPATIENT
Start: 2020-12-29 | End: 2021-03-29 | Stop reason: SDUPTHER

## 2020-12-29 RX ORDER — CHLORTHALIDONE 25 MG/1
TABLET ORAL
COMMUNITY
Start: 2020-12-14 | End: 2021-06-29 | Stop reason: SDUPTHER

## 2020-12-29 RX ORDER — LISINOPRIL 10 MG/1
TABLET ORAL
COMMUNITY
Start: 2020-11-30 | End: 2021-03-29 | Stop reason: SDUPTHER

## 2020-12-29 RX ORDER — OMEPRAZOLE 40 MG/1
40 CAPSULE, DELAYED RELEASE ORAL DAILY
Qty: 90 CAPSULE | Refills: 1 | Status: SHIPPED | OUTPATIENT
Start: 2020-12-29 | End: 2021-03-29 | Stop reason: SDUPTHER

## 2020-12-29 ASSESSMENT — ENCOUNTER SYMPTOMS
CHEST TIGHTNESS: 0
BLOOD IN STOOL: 0
COUGH: 0
SORE THROAT: 0
EYE PAIN: 0
RHINORRHEA: 0
ABDOMINAL PAIN: 0
VOMITING: 0
DIARRHEA: 0
NAUSEA: 0
CONSTIPATION: 0
SHORTNESS OF BREATH: 0

## 2020-12-29 NOTE — PROGRESS NOTES
- States has high cholesterol, try's to watch diet, on cholesterol meds - atorvastatin. - States depression symptoms are stable. On Zoloft. Medication has helped. No side effects. States stable. States Christmas season is a difficult time of year. Declines counselling.     - lab work reviewed with patient (9/2020)     Health Maintenance   - immunizations:   Influenza Vaccination - (9/21/2016) , (10/2017), (10/2018), (2019), (9/15/20)   Pneumonia Vaccination - (12/2015) - prevnar, (12/2016) - pneumonoccal  Zoster/Shingles Vaccine  Tetanus Vaccination - (12/15/2015)    - Screenings:   Bone Density Scan - (2/28/2019)   Pelvic/Pap Exam  Mammogram - (8/14/2014), (4/2019) - neg     Colonoscopy - (2011) - neg per patient    Couseled on Home Safety - (11/28/2017)    Carotid Artery Study - (3/2016) - <50% b/l, also showed thyroid nodules    ROS:  Review of Systems   Constitutional: Negative for appetite change, chills, fever and unexpected weight change. HENT: Negative for congestion, rhinorrhea and sore throat. Eyes: Negative for pain and visual disturbance. Respiratory: Negative for cough, chest tightness and shortness of breath. Cardiovascular: Negative for chest pain and palpitations. Gastrointestinal: Negative for abdominal pain, blood in stool, constipation, diarrhea, nausea and vomiting. Genitourinary: Positive for difficulty urinating and frequency. Negative for dysuria, pelvic pain, urgency and vaginal bleeding. Musculoskeletal: Negative for arthralgias and myalgias. Skin: Negative for rash. Neurological: Negative for dizziness, syncope, weakness, light-headedness, numbness and headaches. Hematological: Negative for adenopathy. Psychiatric/Behavioral: Negative for suicidal ideas. The patient is not nervous/anxious.           Current Outpatient Medications:     lisinopril (PRINIVIL;ZESTRIL) 10 MG tablet, , Disp: , Rfl:     chlorthalidone (HYGROTON) 25 MG tablet, , Disp: , Rfl:    omeprazole (PRILOSEC) 40 MG delayed release capsule, Take 1 capsule by mouth daily Instructed to take with sip water am of procedure, Disp: 90 capsule, Rfl: 1    HYDROcodone-acetaminophen (NORCO) 5-325 MG per tablet, Take 1 tablet by mouth every 8 hours as needed for Pain for up to 30 days. Intended supply: 3 days. Take lowest dose possible to manage pain, Disp: 90 tablet, Rfl: 0    verapamil (CALAN SR) 240 MG extended release tablet, 1 tablet by mouth in am and 0.5 tablet by mouth in pm, Disp: 90 tablet, Rfl: 1    sertraline (ZOLOFT) 100 MG tablet, Take 1 tablet by mouth daily, Disp: 90 tablet, Rfl: 0    levothyroxine (SYNTHROID) 75 MCG tablet, Take 1 tablet by mouth Daily, Disp: 90 tablet, Rfl: 0    terazosin (HYTRIN) 2 MG capsule, Take 1 capsule by mouth nightly, Disp: 90 capsule, Rfl: 1    atorvastatin (LIPITOR) 20 MG tablet, Take 1 tablet by mouth daily, Disp: 90 tablet, Rfl: 1    aspirin 81 MG tablet, Take 81 mg by mouth daily, Disp: , Rfl:     vitamin B-12 (CYANOCOBALAMIN) 500 MCG tablet, Take 500 mcg by mouth daily, Disp: , Rfl:     Multiple Vitamins-Minerals (CENTRUM SILVER PO), Take 500 mg by mouth daily, Disp: , Rfl:     ascorbic acid (VITAMIN C) 500 MG tablet, Take 1,000 mg by mouth daily. 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    Presence of right artificial knee joint 5/14/2019    Thyroid disease        Past Surgical History:   Procedure Laterality Date    CHOLECYSTECTOMY      EYE SURGERY      bilat cataract    HYSTERECTOMY      LUMBAR LAMINECTOMY  05/29/2013    L3-L4 with microdiscectomy    TN TOTAL KNEE ARTHROPLASTY Right 5/1/2018    RIGHT KNEE TOTAL ARTHROPLASTY ([facility]LES)---PRP---PN[facility]--- performed by Ann Cullen MD at [facility] OR       Family History   Problem Relation Age of Onset    Cancer Mother         stomach    Heart Attack Father     Coronary Art Dis Father        Social History     Socioeconomic History    Marital status:      Spouse name: Not on file    Number of children: Not on file    Years of education: Not on file    Highest education level: Not on file   Occupational History    Not on file   Social Needs    Financial resource strain: Not on file    Food insecurity     Worry: Not on file     Inability: Not on file    Transportation needs     Medical: Not on file     Non-medical: Not on file   Tobacco Use    Smoking status: Never Smoker    Smokeless tobacco: Never Used   Substance and Sexual Activity    Alcohol use: No    Drug use: No    Sexual activity: Not on file   Lifestyle    Physical activity     Days per week: Not on file     Minutes per session: Not on file    Stress: Not on file   Relationships    Social connections     Talks on phone: Not on file     Gets together: Not on file     Attends Jew service: Not on file     Active member of club or organization: Not on file     Attends meetings of clubs or organizations: Not on file     Relationship status: Not on file    Intimate partner violence     Fear of current or ex partner: Not on file     Emotionally abused: Not on file     Physically abused: Not on file     Forced sexual activity: Not on file   Other Topics Concern    Not on file   Social History Narrative    Not on file       Vitals:    12/29/20 1603   BP: 128/62   Site: Left Upper Arm   Position: Sitting   Pulse: 90   Temp: 97.3 °F (36.3 °C)   TempSrc: Temporal   SpO2: 97%   Weight: 156 lb 9.6 oz (71 kg)       Exam:  Physical Exam  Constitutional:       Appearance: She is well-developed.    HENT:      Head: Normocephalic and atraumatic. Comments: Facial ecchymosis and swelling      Right Ear: External ear normal.      Left Ear: External ear normal.   Eyes:      Pupils: Pupils are equal, round, and reactive to light. Neck:      Musculoskeletal: Normal range of motion and neck supple. Thyroid: No thyromegaly. Vascular: Carotid bruit present. Comments: Right bruit   Cardiovascular:      Rate and Rhythm: Normal rate and regular rhythm. Heart sounds: Murmur present. Pulmonary:      Effort: Pulmonary effort is normal.      Breath sounds: Normal breath sounds. No wheezing. Abdominal:      General: Bowel sounds are normal. There is no distension. Palpations: Abdomen is soft. Tenderness: There is no abdominal tenderness. There is no guarding or rebound. Musculoskeletal:      Right wrist: She exhibits decreased range of motion, tenderness and swelling. Lumbar back: She exhibits decreased range of motion. Right hand: She exhibits decreased range of motion and tenderness. Comments: Ecchymosis and swelling. Pain to palpation. Some pain with ROM. Lymphadenopathy:      Cervical: No cervical adenopathy. Skin:     General: Skin is warm and dry. Neurological:      Mental Status: She is alert and oriented to person, place, and time. Cranial Nerves: No cranial nerve deficit. Psychiatric:         Judgment: Judgment normal.         Assessment and Plan:    Veryl Safe was seen today for hypertension.     Diagnoses and all orders for this visit:    Stage 2 chronic kidney disease  - uncertain etiology   - May be multifactorial - new medications, long standing HTN   - US kidney ok   - referral to nephrology - stopped lisinopril and HCTZ  - Blood pressure elevated - poss white coat   - labs (9/2020) improved and stopped   - (10/20) - added chlorthialidone 12.5mg    Right wrist pain  - PT and or ortho if not better  - not improving   - referral to ortho placed - did not go   - using voltaren     Dyspnea on exertion  - Uncertain etiology at present   - check EKG   - has seen cardio  - worsen ER   - unchanged     Essential hypertension  - continue present treatment  - monitor blood pressure at home  - watch diet - decreased salt. - on verapamil   - on terasozin   - nephrology stopped lisinopril and hctz 12.5mg   - still elevated - poss white coat   - increased verapamil to 240mg in am and 120mg in pm   - nephro (10/20) - added chlorthialidone 12.5mg    Stenosis of right carotid artery  - Last US carotid 12/2019 - . Less than 50% stenosis of the right and left internal carotid artery. Mixed hyperlipidemia  - continue present treatment  - watch diet  - on atorvastatin   - follow labs (9/2020) - stable     Current mild episode of major depressive disorder, unspecified whether recurrent ([facility] [address]  - continue present treatment  - on zoloft to 100mg  - no suicidal thoughts  - states family thinks is having issues, but patient thinks is ok (12/20)     Gastroesophageal reflux disease without esophagitis  - continue present treatment  - watch diet  - on omeprazole 40mg     Generalized osteoarthritis  - continue present treatment  - following with ortho  - stable    [place] report reviewed (12/29/2020)  Controlled substance agreement updated (1/20/2020)    Controlled Substance Monitoring:    Acute and Chronic Pain Monitoring:   RX Monitoring 12/29/2020   Attestation -   Periodic Controlled Substance Monitoring Possible medication side effects, risk of tolerance/dependence & alternative treatments discussed. ;No signs of potential drug abuse or diversion identified.;Obtaining appropriate analgesic effect of treatment.        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  - s/p partial thyroidectomy  - reviewed path - Hurthle cell  - monitor labs   - increased to 75mcg (1/2020)  - labs lab (9/2020)     Postoperative hypothyroidism  - continue present treatment   - s/p partial thyroidectomy  - monitor labs - slight underactive as of (5/2019)  - on synthroid and increased to 50mcg (5/2017)    Impaired fasting glucose  -continue present treatment  - A1c (9/2020) - normal  - watch diet    Vitamin D deficiency  - Continue present treatment   - on otc supplement   - stable     Presence of right artificial knee joint    Long term current use of therapeutic drug  - Chronic PPI therapy   - follow bone and B12      Return in about 3 months (around 3/29/2021) for check up and review and labs. Orders Placed This Encounter   Procedures    Comprehensive Metabolic Panel     Standing Status:   Future     Standing Expiration Date:   12/29/2021    Magnesium     Standing Status:   Future     Standing Expiration Date:   12/29/2021    Lipid, Fasting     Standing Status:   Future     Standing Expiration Date:   12/29/2021    Hemoglobin A1C     Standing Status:   Future     Standing Expiration Date:   12/29/2021    Vitamin D 25 Hydroxy     Standing Status:   Future     Standing Expiration Date:   12/29/2021    TSH without Reflex     Standing Status:   Future     Standing Expiration Date:   3/29/2021    Urinalysis     Standing Status:   Future     Standing Expiration Date:   12/29/2021   Yrn Mendoza Ur     Standing Status:   Future     Standing Expiration Date:   12/29/2021    CBC Auto Differential     Standing Status:   Future     Standing Expiration Date:   12/29/2021    Vitamin B12 & Folate     Standing Status:   Future     Standing Expiration Date:   12/29/2021     Requested Prescriptions     Signed Prescriptions Disp Refills    omeprazole (PRILOSEC) 40 MG delayed release capsule 90 capsule 1     Sig: Take 1 capsule by mouth daily Instructed to take with sip water am of procedure    HYDROcodone-acetaminophen (NORCO) 5-325 MG per tablet 90 tablet 0     Sig: Take 1 tablet by mouth every 8 hours as needed for Pain for up to 30 days. Intended supply: 3 days.  Take lowest dose possible to manage pain     Nikita Amaya MD  12/29/2020  5:23 PM

## 2021-01-25 DIAGNOSIS — E89.0 POSTOPERATIVE HYPOTHYROIDISM: ICD-10-CM

## 2021-01-25 RX ORDER — LEVOTHYROXINE SODIUM 0.07 MG/1
75 TABLET ORAL DAILY
Qty: 90 TABLET | Refills: 0 | Status: SHIPPED
Start: 2021-01-25 | End: 2021-03-29 | Stop reason: SDUPTHER

## 2021-01-25 NOTE — TELEPHONE ENCOUNTER
Last Appointment:  12/29/2020  Future Appointments   Date Time Provider Batsheva Delgado   3/29/2021  1:00 PM Ronaldo Montes  [address] [place] [address]

## 2021-03-08 DIAGNOSIS — F32.0 CURRENT MILD EPISODE OF MAJOR DEPRESSIVE DISORDER, UNSPECIFIED WHETHER RECURRENT (HCC): Chronic | ICD-10-CM

## 2021-03-08 RX ORDER — SERTRALINE HYDROCHLORIDE 100 MG/1
100 TABLET, FILM COATED ORAL DAILY
Qty: 30 TABLET | Refills: 0 | Status: SHIPPED
Start: 2021-03-08 | End: 2021-03-29 | Stop reason: SDUPTHER

## 2021-03-08 NOTE — TELEPHONE ENCOUNTER
Name of Medication(s) Requested:  Zoloft    Pharmacy Requested:   [facility]    Medication(s) pended? [x] Yes  [] No    Last Appointment:  12/29/2020    Future appts:  Future Appointments   Date Time Provider Batsheva Delgado   3/29/2021  1:00 PM Sveta Pelayo MD [facility]        Does patient need call back?   [] Yes  [x] No

## 2021-03-16 DIAGNOSIS — E78.2 MIXED HYPERLIPIDEMIA: Chronic | ICD-10-CM

## 2021-03-16 RX ORDER — ATORVASTATIN CALCIUM 20 MG/1
20 TABLET, FILM COATED ORAL DAILY
Qty: 30 TABLET | Refills: 0 | Status: SHIPPED
Start: 2021-03-16 | End: 2021-03-29 | Stop reason: SDUPTHER

## 2021-03-16 NOTE — TELEPHONE ENCOUNTER
Name of Medication(s) Requested:  Atorvastatin    Pharmacy Requested:   [facility]    Medication(s) pended? [x] Yes  [] No    Last Appointment:  12/29/2020    Future appts:  Future Appointments   Date Time Provider Batsheva Delgado   3/29/2021  1:00 PM Felecia Martin MD [facility]        Does patient need call back?   [] Yes  [x] No

## 2021-03-24 DIAGNOSIS — Z79.899 LONG TERM CURRENT USE OF THERAPEUTIC DRUG: ICD-10-CM

## 2021-03-24 DIAGNOSIS — E89.0 POSTOPERATIVE HYPOTHYROIDISM: ICD-10-CM

## 2021-03-24 DIAGNOSIS — E55.9 VITAMIN D DEFICIENCY: ICD-10-CM

## 2021-03-24 DIAGNOSIS — E78.2 MIXED HYPERLIPIDEMIA: ICD-10-CM

## 2021-03-24 DIAGNOSIS — R73.01 IMPAIRED FASTING GLUCOSE: ICD-10-CM

## 2021-03-24 LAB
BACTERIA: NORMAL /HPF
BASOPHILS ABSOLUTE: 0.05 E9/L (ref 0–0.2)
BASOPHILS RELATIVE PERCENT: 0.7 % (ref 0–2)
BILIRUBIN URINE: NEGATIVE
BLOOD, URINE: NEGATIVE
CLARITY: CLEAR
COLOR: YELLOW
EOSINOPHILS ABSOLUTE: 0.06 E9/L (ref 0.05–0.5)
EOSINOPHILS RELATIVE PERCENT: 0.8 % (ref 0–6)
GLUCOSE URINE: NEGATIVE MG/DL
HCT VFR BLD CALC: 42 % (ref 34–48)
HEMOGLOBIN: 13.5 G/DL (ref 11.5–15.5)
IMMATURE GRANULOCYTES #: 0.03 E9/L
IMMATURE GRANULOCYTES %: 0.4 % (ref 0–5)
KETONES, URINE: NEGATIVE MG/DL
LEUKOCYTE ESTERASE, URINE: ABNORMAL
LYMPHOCYTES ABSOLUTE: 1.75 E9/L (ref 1.5–4)
LYMPHOCYTES RELATIVE PERCENT: 24.3 % (ref 20–42)
MCH RBC QN AUTO: 31.8 PG (ref 26–35)
MCHC RBC AUTO-ENTMCNC: 32.1 % (ref 32–34.5)
MCV RBC AUTO: 98.8 FL (ref 80–99.9)
MONOCYTES ABSOLUTE: 0.48 E9/L (ref 0.1–0.95)
MONOCYTES RELATIVE PERCENT: 6.7 % (ref 2–12)
NEUTROPHILS ABSOLUTE: 4.82 E9/L (ref 1.8–7.3)
NEUTROPHILS RELATIVE PERCENT: 67.1 % (ref 43–80)
NITRITE, URINE: NEGATIVE
PDW BLD-RTO: 12 FL (ref 11.5–15)
PH UA: 7.5 (ref 5–9)
PLATELET # BLD: 183 E9/L (ref 130–450)
PMV BLD AUTO: 12 FL (ref 7–12)
PROTEIN UA: NEGATIVE MG/DL
RBC # BLD: 4.25 E12/L (ref 3.5–5.5)
RBC UA: NORMAL /HPF (ref 0–2)
SPECIFIC GRAVITY UA: 1.01 (ref 1–1.03)
UROBILINOGEN, URINE: 0.2 E.U./DL
WBC # BLD: 7.2 E9/L (ref 4.5–11.5)
WBC UA: NORMAL /HPF (ref 0–5)

## 2021-03-25 LAB
ALBUMIN SERPL-MCNC: 4.2 G/DL (ref 3.5–5.2)
ALP BLD-CCNC: 92 U/L (ref 35–104)
ALT SERPL-CCNC: 14 U/L (ref 0–32)
ANION GAP SERPL CALCULATED.3IONS-SCNC: 15 MMOL/L (ref 7–16)
AST SERPL-CCNC: 20 U/L (ref 0–31)
BILIRUB SERPL-MCNC: 0.6 MG/DL (ref 0–1.2)
BUN BLDV-MCNC: 21 MG/DL (ref 8–23)
CALCIUM SERPL-MCNC: 9.4 MG/DL (ref 8.6–10.2)
CHLORIDE BLD-SCNC: 104 MMOL/L (ref 98–107)
CHOLESTEROL, FASTING: 196 MG/DL (ref 0–199)
CO2: 23 MMOL/L (ref 22–29)
CREAT SERPL-MCNC: 1.2 MG/DL (ref 0.5–1)
FOLATE: >20 NG/ML (ref 4.8–24.2)
GFR AFRICAN AMERICAN: 51
GFR NON-AFRICAN AMERICAN: 42 ML/MIN/1.73
GLUCOSE BLD-MCNC: 103 MG/DL (ref 74–99)
HBA1C MFR BLD: 5.4 % (ref 4–5.6)
HDLC SERPL-MCNC: 73 MG/DL
LDL CHOLESTEROL CALCULATED: 102 MG/DL (ref 0–99)
MAGNESIUM: 2.3 MG/DL (ref 1.6–2.6)
MICROALBUMIN UR-MCNC: <12 MG/L
POTASSIUM SERPL-SCNC: 4.3 MMOL/L (ref 3.5–5)
SODIUM BLD-SCNC: 142 MMOL/L (ref 132–146)
TOTAL PROTEIN: 6.5 G/DL (ref 6.4–8.3)
TRIGLYCERIDE, FASTING: 107 MG/DL (ref 0–149)
TSH SERPL DL<=0.05 MIU/L-ACNC: 2.16 UIU/ML (ref 0.27–4.2)
VITAMIN B-12: 548 PG/ML (ref 211–946)
VITAMIN D 25-HYDROXY: 37 NG/ML (ref 30–100)
VLDLC SERPL CALC-MCNC: 21 MG/DL

## 2021-03-29 ENCOUNTER — OFFICE VISIT (OUTPATIENT)
Dept: FAMILY MEDICINE CLINIC | Age: 86
End: 2021-03-29
Payer: MEDICARE

## 2021-03-29 VITALS
TEMPERATURE: 97.9 F | WEIGHT: 158.5 LBS | SYSTOLIC BLOOD PRESSURE: 130 MMHG | BODY MASS INDEX: 28.08 KG/M2 | OXYGEN SATURATION: 98 % | HEIGHT: 63 IN | HEART RATE: 80 BPM | DIASTOLIC BLOOD PRESSURE: 60 MMHG

## 2021-03-29 DIAGNOSIS — M15.9 GENERALIZED OSTEOARTHRITIS: ICD-10-CM

## 2021-03-29 DIAGNOSIS — I65.21 STENOSIS OF RIGHT CAROTID ARTERY: ICD-10-CM

## 2021-03-29 DIAGNOSIS — F32.0 CURRENT MILD EPISODE OF MAJOR DEPRESSIVE DISORDER, UNSPECIFIED WHETHER RECURRENT (HCC): Chronic | ICD-10-CM

## 2021-03-29 DIAGNOSIS — K21.9 GASTROESOPHAGEAL REFLUX DISEASE WITHOUT ESOPHAGITIS: Chronic | ICD-10-CM

## 2021-03-29 DIAGNOSIS — Z79.899 LONG TERM CURRENT USE OF THERAPEUTIC DRUG: ICD-10-CM

## 2021-03-29 DIAGNOSIS — D34 HURTHLE CELL TUMOR: ICD-10-CM

## 2021-03-29 DIAGNOSIS — N39.3 STRESS INCONTINENCE OF URINE: ICD-10-CM

## 2021-03-29 DIAGNOSIS — I10 ESSENTIAL HYPERTENSION: ICD-10-CM

## 2021-03-29 DIAGNOSIS — R06.09 DYSPNEA ON EXERTION: ICD-10-CM

## 2021-03-29 DIAGNOSIS — R73.01 IMPAIRED FASTING GLUCOSE: ICD-10-CM

## 2021-03-29 DIAGNOSIS — N18.31 STAGE 3A CHRONIC KIDNEY DISEASE (HCC): Primary | ICD-10-CM

## 2021-03-29 DIAGNOSIS — E78.2 MIXED HYPERLIPIDEMIA: Chronic | ICD-10-CM

## 2021-03-29 DIAGNOSIS — E55.9 VITAMIN D DEFICIENCY: ICD-10-CM

## 2021-03-29 DIAGNOSIS — E89.0 POSTOPERATIVE HYPOTHYROIDISM: ICD-10-CM

## 2021-03-29 PROCEDURE — 99214 OFFICE O/P EST MOD 30 MIN: CPT | Performed by: INTERNAL MEDICINE

## 2021-03-29 RX ORDER — ATORVASTATIN CALCIUM 20 MG/1
20 TABLET, FILM COATED ORAL DAILY
Qty: 90 TABLET | Refills: 1 | Status: SHIPPED | OUTPATIENT
Start: 2021-03-29 | End: 2021-09-27 | Stop reason: SDUPTHER

## 2021-03-29 RX ORDER — VERAPAMIL HYDROCHLORIDE 240 MG/1
240 TABLET, FILM COATED, EXTENDED RELEASE ORAL NIGHTLY
Qty: 90 TABLET | Refills: 1 | Status: SHIPPED | OUTPATIENT
Start: 2021-03-29 | End: 2022-01-05 | Stop reason: DRUGHIGH

## 2021-03-29 RX ORDER — LEVOTHYROXINE SODIUM 0.07 MG/1
75 TABLET ORAL DAILY
Qty: 90 TABLET | Refills: 1 | Status: SHIPPED | OUTPATIENT
Start: 2021-03-29 | End: 2021-09-27 | Stop reason: SDUPTHER

## 2021-03-29 RX ORDER — SERTRALINE HYDROCHLORIDE 100 MG/1
100 TABLET, FILM COATED ORAL DAILY
Qty: 90 TABLET | Refills: 1 | Status: SHIPPED | OUTPATIENT
Start: 2021-03-29 | End: 2021-09-27 | Stop reason: SDUPTHER

## 2021-03-29 RX ORDER — OMEPRAZOLE 40 MG/1
40 CAPSULE, DELAYED RELEASE ORAL DAILY
Qty: 90 CAPSULE | Refills: 1 | Status: SHIPPED | OUTPATIENT
Start: 2021-03-29 | End: 2021-09-27 | Stop reason: SDUPTHER

## 2021-03-29 RX ORDER — HYDROCODONE BITARTRATE AND ACETAMINOPHEN 5; 325 MG/1; MG/1
1 TABLET ORAL EVERY 8 HOURS PRN
Qty: 90 TABLET | Refills: 0 | Status: SHIPPED | OUTPATIENT
Start: 2021-03-29 | End: 2021-06-29 | Stop reason: SDUPTHER

## 2021-03-29 RX ORDER — TERAZOSIN 2 MG/1
2 CAPSULE ORAL NIGHTLY
Qty: 90 CAPSULE | Refills: 1 | Status: SHIPPED | OUTPATIENT
Start: 2021-03-29 | End: 2021-09-27 | Stop reason: SDUPTHER

## 2021-03-29 RX ORDER — LISINOPRIL 10 MG/1
10 TABLET ORAL DAILY
Qty: 90 TABLET | Refills: 1 | Status: SHIPPED | OUTPATIENT
Start: 2021-03-29 | End: 2021-09-27 | Stop reason: SDUPTHER

## 2021-03-29 SDOH — ECONOMIC STABILITY: TRANSPORTATION INSECURITY
IN THE PAST 12 MONTHS, HAS LACK OF TRANSPORTATION KEPT YOU FROM MEETINGS, WORK, OR FROM GETTING THINGS NEEDED FOR DAILY LIVING?: PATIENT DECLINED

## 2021-03-29 SDOH — ECONOMIC STABILITY: FOOD INSECURITY: WITHIN THE PAST 12 MONTHS, THE FOOD YOU BOUGHT JUST DIDN'T LAST AND YOU DIDN'T HAVE MONEY TO GET MORE.: PATIENT DECLINED

## 2021-03-29 ASSESSMENT — ENCOUNTER SYMPTOMS
DIARRHEA: 0
RHINORRHEA: 0
SORE THROAT: 0
NAUSEA: 0
VOMITING: 0
CONSTIPATION: 0
BLOOD IN STOOL: 0
SHORTNESS OF BREATH: 0
CHEST TIGHTNESS: 0
EYE PAIN: 0
ABDOMINAL PAIN: 0
COUGH: 0

## 2021-03-29 NOTE — PROGRESS NOTES
[facility]) Physicians   Internal Medicine     3/29/2021  Ana Laura Stone : 1933 Sex: female  Age:87 y.o. Chief Complaint   Patient presents with    3 Month Follow-Up    Hypertension    Chronic Pain     Back and knee pain         HPI:   Patient presents to office for evaluation for the following medical concerns. - States labs showed renal insuffiencey. Was not sure if related to new medications or increased blood pressure or both. Has seen nephrology. Medications adjusted, last labs improved. (10/20) - added chlorthialidone 12.5mg, f/u 12 months. Last lab (3/2021) showed slight dysfunction, similar     - States has chronic rhinitis. States no cough. States nasal congestion. No sore throat. No chest pain, No fever or chills. No nausea or vomiting.     - States has high blood pressure. States does check blood pressure at home occasionally at home. States range 140's. On verapamil and terazosin, On lisinopril. Last visit  (10/20) - added chlorthialidone 12.5mg, BMP in 1 week, f/u 12 months. Was not taking verapamil twice a day     - States has high cholesterol, try's to watch diet, on cholesterol meds - atorvastatin. - States depression symptoms are stable. On Zoloft. Medication has helped. No side effects. States [place] season is a difficult time of year. 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 No side effects reported. Administration instructions reviewed. Last Lab (2020). - States has urinary incontinence - stable with meds (terazosin). States now having urinary frequency. - States having some back discomfort. Unchanged. No numbness, tingling, weakness. No fever or chills. States lower back bilateral. Declines work up. - States has GERD, on omeprazole - Stable with omeprazole dose. - States has had right total knee arthoplasty for arthritis. Still having right Knee pain.  States also has some back pain. States also right wrist pain. States following with ortho (Dr. Veronika Parker). States had compression stockings. States also using heating pad to back. States needs refill of meds. Takes Norco mostly at night - helping. No reported side effects or complications reported. - lab work from 3/24/2021 reviewed with patient (3/29/2021)     Health Maintenance   - immunizations:   Influenza Vaccination - (9/21/2016) , (10/2017), (10/2018), (2019), (9/15/20)   Pneumonia Vaccination - (12/2015) - prevnar, (12/2016) - pneumonoccal  Zoster/Shingles Vaccine  Tetanus Vaccination - (12/15/2015)    - Screenings:   Bone Density Scan - (2/28/2019)   Pelvic/Pap Exam  Mammogram - (8/14/2014), (4/2019) - neg     Colonoscopy - (2011) - neg per patient    Couseled on Home Safety - (11/28/2017)    Carotid Artery Study - (3/2016) - <50% b/l, also showed thyroid nodules    ROS:  Review of Systems   Constitutional: Negative for appetite change, chills, fever and unexpected weight change. HENT: Negative for congestion, rhinorrhea and sore throat. Eyes: Negative for pain and visual disturbance. Respiratory: Negative for cough, chest tightness and shortness of breath. Cardiovascular: Negative for chest pain and palpitations. Gastrointestinal: Negative for abdominal pain, blood in stool, constipation, diarrhea, nausea and vomiting. Genitourinary: Positive for difficulty urinating and frequency. Negative for dysuria, pelvic pain, urgency and vaginal bleeding. Musculoskeletal: Negative for arthralgias and myalgias. Skin: Negative for rash. Neurological: Negative for dizziness, syncope, weakness, light-headedness, numbness and headaches. Hematological: Negative for adenopathy. Psychiatric/Behavioral: Negative for suicidal ideas. The patient is not nervous/anxious.           Current Outpatient Medications:     sertraline (ZOLOFT) 100 MG tablet, Take 1 tablet by mouth daily, Disp: 90 tablet, Rfl: 1    lisinopril (PRINIVIL;ZESTRIL) 10 MG tablet, Take 1 tablet by mouth daily, Disp: 90 tablet, Rfl: 1    HYDROcodone-acetaminophen (NORCO) 5-325 MG per tablet, Take 1 tablet by mouth every 8 hours as needed for Pain for up to 30 days. Intended supply: 3 days. Take lowest dose possible to manage pain, Disp: 90 tablet, Rfl: 0    levothyroxine (SYNTHROID) 75 MCG tablet, Take 1 tablet by mouth Daily, Disp: 90 tablet, Rfl: 1    atorvastatin (LIPITOR) 20 MG tablet, Take 1 tablet by mouth daily, Disp: 90 tablet, Rfl: 1    omeprazole (PRILOSEC) 40 MG delayed release capsule, Take 1 capsule by mouth daily Instructed to take with sip water am of procedure, Disp: 90 capsule, Rfl: 1    terazosin (HYTRIN) 2 MG capsule, Take 1 capsule by mouth nightly, Disp: 90 capsule, Rfl: 1    verapamil (CALAN SR) 240 MG extended release tablet, Take 1 tablet by mouth nightly 1 tablet by mouth in am and 0.5 tablet by mouth in pm, Disp: 90 tablet, Rfl: 1    chlorthalidone (HYGROTON) 25 MG tablet, , Disp: , Rfl:     aspirin 81 MG tablet, Take 81 mg by mouth daily, Disp: , Rfl:     vitamin B-12 (CYANOCOBALAMIN) 500 MCG tablet, Take 500 mcg by mouth daily, Disp: , Rfl:     Multiple Vitamins-Minerals (CENTRUM SILVER PO), Take 500 mg by mouth daily, Disp: , Rfl:     ascorbic acid (VITAMIN C) 500 MG tablet, Take 1,000 mg by mouth daily. 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    Presence of right artificial knee joint 5/14/2019    Thyroid disease        Past Surgical History:   Procedure Laterality Date    CHOLECYSTECTOMY      EYE SURGERY      bilat cataract    HYSTERECTOMY      LUMBAR LAMINECTOMY  05/29/2013    L3-L4 with microdiscectomy    NC TOTAL KNEE ARTHROPLASTY Right 5/1/2018    RIGHT KNEE TOTAL ARTHROPLASTY (CHARLES)---PRP---PNB--- performed by Todd [facility] at [facility] OR       Family History   Problem Relation Age of Onset    Cancer Mother         stomach    Heart Attack Father     Coronary Art Dis Father        Social History     Socioeconomic History    Marital status:       Spouse name: Not on file    Number of children: Not on file    Years of education: Not on file    Highest education level: Not on file   Occupational History    Not on file   Social Needs    Financial resource strain: Patient refused    Food insecurity     Worry: Patient refused     Inability: Patient refused    Transportation needs     Medical: Patient refused     Non-medical: Patient refused   Tobacco Use    Smoking status: Never Smoker    Smokeless tobacco: Never Used   Substance and Sexual Activity    Alcohol use: No    Drug use: No    Sexual activity: Not on file   Lifestyle    Physical activity     Days per week: Not on file     Minutes per session: Not on file    Stress: Not on file   Relationships    Social connections     Talks on phone: Not on file     Gets together: Not on file     Attends Yazidism service: Not on file     Active member of club or organization: Not on file     Attends meetings of clubs or organizations: Not on file     Relationship status: Not on file    Intimate partner violence     Fear of current or ex partner: Not on file     Emotionally abused: Not on file     Physically abused: Not on file     Forced sexual activity: Not on file   Other Topics Concern    Not on file   Social History Narrative    Not on file       Vitals:    03/29/21 1257   BP: 130/60   Pulse: 80   Temp: 97.9 °F (36.6 °C)   SpO2: 98%   Weight: 158 lb 8 oz (71.9 kg)   Height: 5' 3\" (1.6 m)       Exam:  Physical Exam  Constitutional:       Appearance: She is well-developed. HENT:      Head: Normocephalic and atraumatic. Comments: Facial ecchymosis and swelling      Right Ear: External ear normal.      Left Ear: External ear normal.   Eyes:      Pupils: Pupils are equal, round, and reactive to light. Neck:      Musculoskeletal: Normal range of motion and neck supple. Thyroid: No thyromegaly. Vascular: Carotid bruit present. Comments: Right bruit   Cardiovascular:      Rate and Rhythm: Normal rate and regular rhythm. Heart sounds: Murmur present. Pulmonary:      Effort: Pulmonary effort is normal.      Breath sounds: Normal breath sounds. No wheezing. Abdominal:      General: Bowel sounds are normal. There is no distension. Palpations: Abdomen is soft. Tenderness: There is no abdominal tenderness. There is no guarding or rebound. Musculoskeletal:      Right wrist: She exhibits decreased range of motion, tenderness and swelling. Lumbar back: She exhibits decreased range of motion. Right hand: She exhibits decreased range of motion and tenderness. Comments: Ecchymosis and swelling. Pain to palpation. Some pain with ROM. Lymphadenopathy:      Cervical: No cervical adenopathy. Skin:     General: Skin is warm and dry. Neurological:      Mental Status: She is alert and oriented to person, place, and time. Cranial Nerves: No cranial nerve deficit. Psychiatric:         Judgment: Judgment normal.         Assessment and Plan:    Diagnoses and all orders for this visit:    Stage 3a chronic kidney disease  - uncertain etiology   - May be multifactorial - new medications, long standing HTN   - US kidney ok   - following with nephrology   - (10/20) - added chlorthialidone 12.5mg  - last lab (3/2021) showed slight dysfunction     Essential hypertension  - monitor blood pressure at home  - watch diet - decreased salt.   - on verapamil   - on terasozin   - on lisinopril - on chlorthialidone 12.5mg   - poss white coat   - improved     Stenosis of right carotid artery  - Last US carotid 12/2019 - . Less than 50% stenosis of the right and left internal carotid artery. Mixed hyperlipidemia  - watch diet  - on atorvastatin   - follow labs (3/2021) - stable     Current mild episode of major depressive disorder, unspecified whether recurrent ([address]  - on zoloft to 100mg  - no suicidal thoughts  - states family thinks is having issues, but patient thinks is ok (12/20)     Gastroesophageal reflux disease without esophagitis  - watch diet  - on omeprazole 40mg   - stable on meds     Generalized osteoarthritis  - following with ortho  - stable    [place]ARRS report reviewed (3/29/2021)  Controlled substance agreement updated (3/29/2021)    Controlled Substance Monitoring:    Acute and Chronic Pain Monitoring:   RX Monitoring 3/29/2021   Attestation -   Periodic Controlled Substance Monitoring Possible medication side effects, risk of tolerance/dependence & alternative treatments discussed. ;No signs of potential drug abuse or diversion identified.;Obtaining appropriate analgesic effect of treatment.        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  - s/p partial thyroidectomy  - reviewed path - Hurthle cell  - monitor labs   - increased to 75mcg (1/2020)  - labs lab (3/2021) - stable     Postoperative hypothyroidism  - s/p partial thyroidectomy  - on synthroid  - monitor labs   - last lab (3/2021) - Stable     Impaired fasting glucose  - A1c (3/2021) - normal at 5.4  - watch diet    Vitamin D deficiency  - on otc supplement   - stable     Presence of right artificial knee joint    Long term current use of therapeutic drug  - Chronic PPI therapy   - follow bone and B12     Dyspnea on exertion  - Uncertain etiology at present   - check EKG   - has seen cardio  - worsen ER   - unchanged     Return in about 3 months (around 6/29/2021) for check up and review. No orders of the defined types were placed in this encounter. Requested Prescriptions     Signed Prescriptions Disp Refills    sertraline (ZOLOFT) 100 MG tablet 90 tablet 1     Sig: Take 1 tablet by mouth daily    lisinopril (PRINIVIL;ZESTRIL) 10 MG tablet 90 tablet 1     Sig: Take 1 tablet by mouth daily    HYDROcodone-acetaminophen (NORCO) 5-325 MG per tablet 90 tablet 0     Sig: Take 1 tablet by mouth every 8 hours as needed for Pain for up to 30 days. Intended supply: 3 days.  Take lowest dose possible to manage pain    levothyroxine (SYNTHROID) 75 MCG tablet 90 tablet 1     Sig: Take 1 tablet by mouth Daily    atorvastatin (LIPITOR) 20 MG tablet 90 tablet 1     Sig: Take 1 tablet by mouth daily    omeprazole (PRILOSEC) 40 MG delayed release capsule 90 capsule 1     Sig: Take 1 capsule by mouth daily Instructed to take with sip water am of procedure    terazosin (HYTRIN) 2 MG capsule 90 capsule 1     Sig: Take 1 capsule by mouth nightly    verapamil (CALAN SR) 240 MG extended release tablet 90 tablet 1     Sig: Take 1 tablet by mouth nightly 1 tablet by mouth in am and 0.5 tablet by mouth in pm     Raphael Barreto MD  3/29/2021  1:39 PM

## 2021-04-19 ENCOUNTER — OFFICE VISIT (OUTPATIENT)
Dept: FAMILY MEDICINE CLINIC | Age: 86
End: 2021-04-19
Payer: MEDICARE

## 2021-04-19 VITALS
SYSTOLIC BLOOD PRESSURE: 128 MMHG | BODY MASS INDEX: 28.35 KG/M2 | HEART RATE: 82 BPM | OXYGEN SATURATION: 95 % | DIASTOLIC BLOOD PRESSURE: 68 MMHG | TEMPERATURE: 96.5 F | HEIGHT: 63 IN | RESPIRATION RATE: 18 BRPM | WEIGHT: 160 LBS

## 2021-04-19 DIAGNOSIS — H61.23 BILATERAL IMPACTED CERUMEN: Primary | ICD-10-CM

## 2021-04-19 PROCEDURE — 99212 OFFICE O/P EST SF 10 MIN: CPT | Performed by: PHYSICIAN ASSISTANT

## 2021-04-19 ASSESSMENT — ENCOUNTER SYMPTOMS
GASTROINTESTINAL NEGATIVE: 1
EYES NEGATIVE: 1
ALLERGIC/IMMUNOLOGIC NEGATIVE: 1
RESPIRATORY NEGATIVE: 1

## 2021-04-19 NOTE — PROGRESS NOTES
Chief Complaint   Patient presents with    Otalgia     bilateral        HPI: This is a 80-year-old female presents unable to hear out of both ears. She does get occasional cerumen impaction. Denies any recent coughs or colds. Current Outpatient Medications:     sertraline (ZOLOFT) 100 MG tablet, Take 1 tablet by mouth daily, Disp: 90 tablet, Rfl: 1    lisinopril (PRINIVIL;ZESTRIL) 10 MG tablet, Take 1 tablet by mouth daily, Disp: 90 tablet, Rfl: 1    HYDROcodone-acetaminophen (NORCO) 5-325 MG per tablet, Take 1 tablet by mouth every 8 hours as needed for Pain for up to 30 days. Intended supply: 3 days. Take lowest dose possible to manage pain, Disp: 90 tablet, Rfl: 0    levothyroxine (SYNTHROID) 75 MCG tablet, Take 1 tablet by mouth Daily, Disp: 90 tablet, Rfl: 1    atorvastatin (LIPITOR) 20 MG tablet, Take 1 tablet by mouth daily, Disp: 90 tablet, Rfl: 1    omeprazole (PRILOSEC) 40 MG delayed release capsule, Take 1 capsule by mouth daily Instructed to take with sip water am of procedure, Disp: 90 capsule, Rfl: 1    terazosin (HYTRIN) 2 MG capsule, Take 1 capsule by mouth nightly, Disp: 90 capsule, Rfl: 1    verapamil (CALAN SR) 240 MG extended release tablet, Take 1 tablet by mouth nightly 1 tablet by mouth in am and 0.5 tablet by mouth in pm, Disp: 90 tablet, Rfl: 1    chlorthalidone (HYGROTON) 25 MG tablet, , Disp: , Rfl:     aspirin 81 MG tablet, Take 81 mg by mouth daily, Disp: , Rfl:     vitamin B-12 (CYANOCOBALAMIN) 500 MCG tablet, Take 500 mcg by mouth daily, Disp: , Rfl:     Multiple Vitamins-Minerals (CENTRUM SILVER PO), Take 500 mg by mouth daily, Disp: , Rfl:     ascorbic acid (VITAMIN C) 500 MG tablet, Take 1,000 mg by mouth daily. , Disp: , Rfl:     Cholecalciferol (VITAMIN D3) 1000 units TABS, Take 1,000 Units by mouth daily , Disp: , Rfl:        No Known Allergies      Review of Systems  Review of Systems   Constitutional: Negative. HENT: Negative.          Cant hear   Eyes: Negative. Respiratory: Negative. Cardiovascular: Negative. Gastrointestinal: Negative. Endocrine: Negative. Genitourinary: Negative. Musculoskeletal: Negative. Skin: Negative. Allergic/Immunologic: Negative. Neurological: Negative. Hematological: Negative. Psychiatric/Behavioral: Negative. VS:  /68   Pulse 82   Temp 96.5 °F (35.8 °C)   Resp 18   Ht 5' 3\" (1.6 m)   Wt 160 lb (72.6 kg)   SpO2 95%   BMI 28.34 kg/m²     Patient's medical, social, and family history reviewed      Physical Exam  Physical Exam      Assessment/Plan:  Mariella Banks was seen today for otalgia. Diagnoses and all orders for this visit:    Bilateral impacted cerumen        Irrigated with good results by Soraya Bailey LPN. RTO prn.   Daniel Multani PA-C

## 2021-06-29 ENCOUNTER — TELEPHONE (OUTPATIENT)
Dept: CARDIOLOGY CLINIC | Age: 86
End: 2021-06-29

## 2021-06-29 ENCOUNTER — TELEPHONE (OUTPATIENT)
Dept: FAMILY MEDICINE CLINIC | Age: 86
End: 2021-06-29

## 2021-06-29 ENCOUNTER — OFFICE VISIT (OUTPATIENT)
Dept: FAMILY MEDICINE CLINIC | Age: 86
End: 2021-06-29
Payer: MEDICARE

## 2021-06-29 VITALS
SYSTOLIC BLOOD PRESSURE: 120 MMHG | HEART RATE: 75 BPM | HEIGHT: 63 IN | TEMPERATURE: 98 F | DIASTOLIC BLOOD PRESSURE: 68 MMHG | BODY MASS INDEX: 28 KG/M2 | OXYGEN SATURATION: 98 % | WEIGHT: 158 LBS

## 2021-06-29 DIAGNOSIS — F32.0 CURRENT MILD EPISODE OF MAJOR DEPRESSIVE DISORDER, UNSPECIFIED WHETHER RECURRENT (HCC): ICD-10-CM

## 2021-06-29 DIAGNOSIS — E89.0 POSTOPERATIVE HYPOTHYROIDISM: ICD-10-CM

## 2021-06-29 DIAGNOSIS — K21.9 GASTROESOPHAGEAL REFLUX DISEASE WITHOUT ESOPHAGITIS: ICD-10-CM

## 2021-06-29 DIAGNOSIS — D34 HURTHLE CELL TUMOR: ICD-10-CM

## 2021-06-29 DIAGNOSIS — I10 ESSENTIAL HYPERTENSION: ICD-10-CM

## 2021-06-29 DIAGNOSIS — N39.3 STRESS INCONTINENCE OF URINE: ICD-10-CM

## 2021-06-29 DIAGNOSIS — I65.21 STENOSIS OF RIGHT CAROTID ARTERY: ICD-10-CM

## 2021-06-29 DIAGNOSIS — R06.09 DYSPNEA ON EXERTION: Primary | ICD-10-CM

## 2021-06-29 DIAGNOSIS — N18.31 STAGE 3A CHRONIC KIDNEY DISEASE (HCC): ICD-10-CM

## 2021-06-29 DIAGNOSIS — M15.9 GENERALIZED OSTEOARTHRITIS: ICD-10-CM

## 2021-06-29 DIAGNOSIS — E78.2 MIXED HYPERLIPIDEMIA: ICD-10-CM

## 2021-06-29 DIAGNOSIS — R73.01 IMPAIRED FASTING GLUCOSE: ICD-10-CM

## 2021-06-29 DIAGNOSIS — Z79.899 LONG TERM CURRENT USE OF THERAPEUTIC DRUG: ICD-10-CM

## 2021-06-29 PROCEDURE — 93000 ELECTROCARDIOGRAM COMPLETE: CPT | Performed by: INTERNAL MEDICINE

## 2021-06-29 PROCEDURE — 99214 OFFICE O/P EST MOD 30 MIN: CPT | Performed by: INTERNAL MEDICINE

## 2021-06-29 RX ORDER — CHLORTHALIDONE 25 MG/1
12.5 TABLET ORAL DAILY
Qty: 45 TABLET | Refills: 1 | Status: SHIPPED | OUTPATIENT
Start: 2021-06-29 | End: 2022-01-05 | Stop reason: SDUPTHER

## 2021-06-29 RX ORDER — HYDROCODONE BITARTRATE AND ACETAMINOPHEN 5; 325 MG/1; MG/1
1 TABLET ORAL EVERY 8 HOURS PRN
Qty: 90 TABLET | Refills: 0 | Status: SHIPPED | OUTPATIENT
Start: 2021-06-29 | End: 2021-09-27 | Stop reason: SDUPTHER

## 2021-06-29 ASSESSMENT — ENCOUNTER SYMPTOMS
ABDOMINAL PAIN: 0
EYE PAIN: 0
SORE THROAT: 0
RHINORRHEA: 0
CHEST TIGHTNESS: 0
BLOOD IN STOOL: 0
SHORTNESS OF BREATH: 0
DIARRHEA: 0
BACK PAIN: 1
NAUSEA: 0
VOMITING: 0
CONSTIPATION: 0
COUGH: 0

## 2021-06-29 NOTE — TELEPHONE ENCOUNTER
Please let the patient know that chest x-ray per radiology report was considered normal no acute process evidence of pneumonia or fluid or other changes    Thanks

## 2021-06-29 NOTE — PROGRESS NOTES
[facility]) Physicians   Internal Medicine     2021  Laisha Orantes : 1933 Sex: female  Age:87 y.o. Chief Complaint   Patient presents with    3 Month Follow-Up    Arthritis    Chronic Pain        HPI:   Patient presents to office for evaluation for the following medical concerns. - States has been fatigued more recently. States Some SOB. No chest pain/discomfort. No lightheaded or dizzy. States worse with activity.     - States labs showed renal insuffiencey. Was not sure if related to new medications or increased blood pressure or both. Has seen nephrology. Medications adjusted. (10/20) - added chlorthialidone 12.5mg, f/u 12 months. Last lab (3/2021) showed slight dysfunction, similar     - States has chronic rhinitis. Stable. States no cough. States nasal congestion. No sore throat. No chest pain, No fever or chills. No nausea or vomiting.     - States has high blood pressure. States does check blood pressure at home occasionally at home, range 120's. On verapamil,  terazosin, On lisinopril. Last visit  (10/20) - added chlorthialidone 12.5mg.      - States has high cholesterol, try's to watch diet, on cholesterol meds - atorvastatin. - States depression symptoms are stable. On Zoloft, helping. No side effects. States [place] season is a difficult time of year. 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 No side effects reported. Administration instructions reviewed. Last Lab (3/2021) - stable. - States has urinary incontinence - stable with meds (terazosin). States now having urinary frequency. - States having some back discomfort. Unchanged. No numbness, tingling, weakness. No fever or chills. States lower back bilateral. Declines work up. On hydrocodone. States taking usually daily. No reported side effects. Helping.     - States has GERD, on omeprazole - Stable with omeprazole dose.      - nervous/anxious. Current Outpatient Medications:     HYDROcodone-acetaminophen (NORCO) 5-325 MG per tablet, Take 1 tablet by mouth every 8 hours as needed for Pain for up to 30 days. , Disp: 90 tablet, Rfl: 0    chlorthalidone (HYGROTON) 25 MG tablet, Take 0.5 tablets by mouth daily, Disp: 45 tablet, Rfl: 1    sertraline (ZOLOFT) 100 MG tablet, Take 1 tablet by mouth daily, Disp: 90 tablet, Rfl: 1    lisinopril (PRINIVIL;ZESTRIL) 10 MG tablet, Take 1 tablet by mouth daily, Disp: 90 tablet, Rfl: 1    levothyroxine (SYNTHROID) 75 MCG tablet, Take 1 tablet by mouth Daily, Disp: 90 tablet, Rfl: 1    atorvastatin (LIPITOR) 20 MG tablet, Take 1 tablet by mouth daily, Disp: 90 tablet, Rfl: 1    omeprazole (PRILOSEC) 40 MG delayed release capsule, Take 1 capsule by mouth daily Instructed to take with sip water am of procedure, Disp: 90 capsule, Rfl: 1    terazosin (HYTRIN) 2 MG capsule, Take 1 capsule by mouth nightly, Disp: 90 capsule, Rfl: 1    verapamil (CALAN SR) 240 MG extended release tablet, Take 1 tablet by mouth nightly 1 tablet by mouth in am and 0.5 tablet by mouth in pm, Disp: 90 tablet, Rfl: 1    aspirin 81 MG tablet, Take 81 mg by mouth daily, Disp: , Rfl:     Multiple Vitamins-Minerals (CENTRUM SILVER PO), Take 500 mg by mouth daily, Disp: , Rfl:     ascorbic acid (VITAMIN C) 500 MG tablet, Take 1,000 mg by mouth daily. 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    Presence of right artificial knee joint 5/14/2019    Thyroid disease        Past Surgical History:   Procedure Laterality Date    CHOLECYSTECTOMY      EYE SURGERY      bilat cataract    HYSTERECTOMY      LUMBAR LAMINECTOMY  05/29/2013    L3-L4 with microdiscectomy    CA TOTAL KNEE ARTHROPLASTY Right 5/1/2018    RIGHT KNEE TOTAL ARTHROPLASTY (CHARLES)---PRP---PNB--- performed by Kaela Becerril MD at [facility] OR       Family History   Problem Relation Age of Onset    Cancer Mother         stomach    Heart Attack Father     Coronary Art Dis Father        Social History     Socioeconomic History    Marital status:       Spouse name: Not on file    Number of children: Not on file    Years of education: Not on file    Highest education level: Not on file   Occupational History    Not on file   Tobacco Use    Smoking status: Never Smoker    Smokeless tobacco: Never Used   Substance and Sexual Activity    Alcohol use: No    Drug use: No    Sexual activity: Not on file   Other Topics Concern    Not on file   Social History Narrative    Not on file     Social Determinants of Health     Financial Resource Strain: Unknown    Difficulty of Paying Living Expenses: Patient refused   Food Insecurity: Unknown    Worried About Running Out of Food in the Last Year: Patient refused    Ran Out of Food in the Last Year: Patient refused   Transportation Needs: Unknown    Lack of Transportation (Medical): Patient refused    Lack of Transportation (Non-Medical): Patient refused   Physical Activity:     Days of Exercise per Week:     Minutes of Exercise per Session:    Stress:     Feeling of Stress :    Social Connections:     Frequency of Communication with Friends and Family:     Frequency of Social Gatherings with Friends and Family:     Attends Mormon Services:     Active Member of Clubs or Organizations:     Attends Club or Organization Meetings:     Marital Status:    Intimate Partner Violence:     Fear of Current or Ex-Partner:     Emotionally Abused:     Physically Abused:     Sexually Abused: Vitals:    06/29/21 1257   BP: 120/68   Site: Right Upper Arm   Position: Sitting   Cuff Size: Medium Adult   Pulse: 75   Temp: 98 °F (36.7 °C)   SpO2: 98%   Weight: 158 lb (71.7 kg)   Height: 5' 3\" (1.6 m)       Exam:  Physical Exam  Constitutional:       Appearance: She is well-developed. HENT:      Head: Normocephalic and atraumatic. Comments: Facial ecchymosis and swelling      Right Ear: External ear normal.      Left Ear: External ear normal.   Eyes:      Pupils: Pupils are equal, round, and reactive to light. Neck:      Thyroid: No thyromegaly. Vascular: Carotid bruit present. Comments: Right bruit   Cardiovascular:      Rate and Rhythm: Normal rate and regular rhythm. Heart sounds: Murmur heard. Pulmonary:      Effort: Pulmonary effort is normal.      Breath sounds: Normal breath sounds. No wheezing. Abdominal:      General: Bowel sounds are normal. There is no distension. Palpations: Abdomen is soft. Tenderness: There is no abdominal tenderness. There is no guarding or rebound. Musculoskeletal:      Right wrist: Swelling and tenderness present. Decreased range of motion. Right hand: Tenderness present. Decreased range of motion. Cervical back: Normal range of motion and neck supple. Lumbar back: Decreased range of motion. Comments: Ecchymosis and swelling. Pain to palpation. Some pain with ROM. Lymphadenopathy:      Cervical: No cervical adenopathy. Skin:     General: Skin is warm and dry. Neurological:      Mental Status: She is alert and oriented to person, place, and time. Cranial Nerves: No cranial nerve deficit.    Psychiatric:         Judgment: Judgment normal.         Assessment and Plan:    Diagnoses and all orders for this visit:    Dyspnea on exertion  - Uncertain etiology at present   - recheck EKG   - has seen cardio  - worsen ER   - refer to cardio for re-eval     Stage 3a chronic kidney disease  - uncertain etiology

## 2021-06-30 NOTE — TELEPHONE ENCOUNTER
Patient notified of chest xray being negative per radiology report. No acute evidence of pneumonia or fluid changes.

## 2021-07-15 ENCOUNTER — TELEPHONE (OUTPATIENT)
Dept: CARDIOLOGY | Age: 86
End: 2021-07-15

## 2021-07-15 ENCOUNTER — OFFICE VISIT (OUTPATIENT)
Dept: CARDIOLOGY CLINIC | Age: 86
End: 2021-07-15
Payer: MEDICARE

## 2021-07-15 ENCOUNTER — HOSPITAL ENCOUNTER (OUTPATIENT)
Dept: CARDIOLOGY | Age: 86
Discharge: HOME | End: 2021-07-15
Payer: MEDICARE

## 2021-07-15 VITALS
BODY MASS INDEX: 27.7 KG/M2 | WEIGHT: 156.3 LBS | SYSTOLIC BLOOD PRESSURE: 138 MMHG | HEART RATE: 63 BPM | HEIGHT: 63 IN | RESPIRATION RATE: 16 BRPM | DIASTOLIC BLOOD PRESSURE: 80 MMHG

## 2021-07-15 DIAGNOSIS — R06.09 DOE (DYSPNEA ON EXERTION): ICD-10-CM

## 2021-07-15 DIAGNOSIS — I10 HYPERTENSION, UNSPECIFIED TYPE: Primary | ICD-10-CM

## 2021-07-15 LAB
LV EF: 60 %
LVEF MODALITY: NORMAL

## 2021-07-15 PROCEDURE — 93000 ELECTROCARDIOGRAM COMPLETE: CPT | Performed by: INTERNAL MEDICINE

## 2021-07-15 PROCEDURE — 99214 OFFICE O/P EST MOD 30 MIN: CPT | Performed by: INTERNAL MEDICINE

## 2021-07-15 PROCEDURE — 93306 TTE W/DOPPLER COMPLETE: CPT

## 2021-07-15 NOTE — PROGRESS NOTES
CHIEF COMPLAINT: SHERWOOD    HISTORY OF PRESENT ILLNESS: Patient is a 80 y.o. female seen at the request of Sebastian Awad MD.      Increased SHERWOOD. Patient denies prior cardiac history. 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    Depression     GERD (gastroesophageal reflux disease)     Hyperlipemia     Hypertension     Impaired fasting glucose 5/14/2019    Postoperative hypothyroidism 5/14/2019    Presence of right artificial knee joint 5/14/2019    Thyroid disease        Patient Active Problem List   Diagnosis    Essential hypertension    Hyperlipidemia    GERD (gastroesophageal reflux disease)    Depression    Right lumbar radiculopathy    Abnormal EKG    Arthritis of right knee    Status post right knee replacement    Generalized osteoarthritis    Urinary incontinence    Hurthle cell tumor    Impaired fasting glucose    Vitamin D deficiency    Presence of right artificial knee joint    Long term current use of therapeutic drug    Postoperative hypothyroidism    Dyspnea on exertion    Right foot pain    Stenosis of right carotid artery    Right wrist pain    Acute kidney insufficiency       No Known Allergies    Current Outpatient Medications   Medication Sig Dispense Refill    HYDROcodone-acetaminophen (NORCO) 5-325 MG per tablet Take 1 tablet by mouth every 8 hours as needed for Pain for up to 30 days.  90 tablet 0    chlorthalidone (HYGROTON) 25 MG tablet Take 0.5 tablets by mouth daily 45 tablet 1    sertraline (ZOLOFT) 100 MG tablet Take 1 tablet by mouth daily 90 tablet 1    lisinopril (PRINIVIL;ZESTRIL) 10 MG tablet Take 1 tablet by mouth daily 90 tablet 1    levothyroxine (SYNTHROID) 75 MCG tablet Take 1 tablet by mouth Daily 90 tablet 1    atorvastatin (LIPITOR) 20 MG tablet Take 1 tablet by mouth daily 90 tablet 1    omeprazole (PRILOSEC) 40 MG delayed release Active Member of Clubs or Organizations:     Attends Club or Organization Meetings:     Marital Status:    Intimate Partner Violence:     Fear of Current or Ex-Partner:     Emotionally Abused:     Physically Abused:     Sexually Abused:        Family History   Problem Relation Age of Onset    Cancer Mother         stomach    Heart Attack Father     Coronary Art Dis Father      Review of Systems:  Heart: as above   Lungs: as above   Eyes: denies changes in vision or discharge. Ears: denies changes in hearing or pain. Nose: denies epistaxis or masses   Throat: denies sore throat or trouble swallowing. Neuro: denies numbness, tingling, tremors. Skin: denies rashes or itching. : denies hematuria, dysuria   GI: denies vomiting, diarrhea   Psych: denies mood changed, anxiety, depression. Physical Exam   /80   Pulse 63   Resp 16   Ht 5' 3\" (1.6 m)   Wt 156 lb 4.8 oz (70.9 kg)   BMI 27.69 kg/m²   Constitutional: Oriented to person, place, and time. Well-developed and well-nourished. No distress. Head: Normocephalic and atraumatic. Eyes: EOM are normal. Pupils are equal, round, and reactive to light. Neck: Normal range of motion. Neck supple. No hepatojugular reflux and no JVD present. Carotid bruit is not present. No tracheal deviation present. No thyromegaly present. Cardiovascular: Normal rate, regular rhythm, normal heart sounds and intact distal pulses. Exam reveals no gallop and no friction rub. No murmur heard. Pulmonary/Chest: Effort normal and breath sounds normal. No respiratory distress. No wheezes. No rales. No tenderness. Abdominal: Soft. Bowel sounds are normal. No distension and no mass. No tenderness. No rebound and no guarding. Musculoskeletal: Normal range of motion. No edema and no tenderness. Lymphadenopathy:   No cervical adenopathy. No groin adenopathy. Neurological: Alert and oriented to person, place, and time. Skin: Skin is warm and dry.  No rash noted. Not diaphoretic. No erythema. Psychiatric: Normal mood and affect. Behavior is normal.     EKG:  normal sinus rhythm. ASSESSMENT AND PLAN:  Patient Active Problem List   Diagnosis    Essential hypertension    Hyperlipidemia    GERD (gastroesophageal reflux disease)    Depression    Right lumbar radiculopathy    Abnormal EKG    Arthritis of right knee    Status post right knee replacement    Generalized osteoarthritis    Urinary incontinence    Hurthle cell tumor    Impaired fasting glucose    Vitamin D deficiency    Presence of right artificial knee joint    Long term current use of therapeutic drug    Postoperative hypothyroidism    Dyspnea on exertion    Right foot pain    Stenosis of right carotid artery    Right wrist pain    Acute kidney insufficiency     1. SHERWOOD:     Echo. Shubham Poole D.O.   Cardiologist  Cardiology, [address]

## 2021-07-15 NOTE — TELEPHONE ENCOUNTER
SCHEDULED ECHO FOR 07-15-21. REVIEWED COVID CHECKLIST WITH PATIENT.   Electronically signed by Eliane Bowles on 7/15/2021 at 11:09 AM

## 2021-07-19 ENCOUNTER — TELEPHONE (OUTPATIENT)
Dept: CARDIOLOGY CLINIC | Age: 86
End: 2021-07-19

## 2021-08-12 ENCOUNTER — TELEPHONE (OUTPATIENT)
Dept: FAMILY MEDICINE CLINIC | Age: 86
End: 2021-08-12

## 2021-08-12 NOTE — TELEPHONE ENCOUNTER
----- Message from CHEYENNEJAMAL HEDRICK [facility] sent at 8/12/2021 11:33 AM EDT -----  Subject: Appointment Request    Reason for Call: Urgent Back Neck Pain    QUESTIONS  Type of Appointment? Established Patient  Reason for appointment request? No appointments available during search  Additional Information for Provider? pt calling in has been experiencing   severe back pain , and wants to be seen, nothing was available during   search please advise thank you   ---------------------------------------------------------------------------  --------------  CALL BACK INFO  What is the best way for the office to contact you? OK to leave message on   voicemail  Preferred Call Back Phone Number? [phone]  ---------------------------------------------------------------------------  --------------  SCRIPT ANSWERS  Relationship to Patient? Self  Did you have an injury or trauma within the past 3 days? No  Are you having new problems with your bowel or bladder control? No  Are you having new onset numbness, tingling, or weakness in your arms   and/or legs with this pain? No  Did your pain begin within the past 14 days? No  Are you having severe pain? Yes  Have you been diagnosed with, awaiting test results for, or told that you   are suspected of having COVID-19 (Coronavirus)? (If patient has tested   negative or was tested as a requirement for work, school, or travel and   not based on symptoms, answer no)? No  Do you currently have flu-like symptoms including fever or chills, cough,   shortness of breath, difficulty breathing, or new loss of taste or smell? No  Have you had close contact with someone with COVID-19 in the last 14 days? No  (Service Expert  click yes below to proceed with [employer] As Usual   Scheduling)?  Yes

## 2021-08-16 ENCOUNTER — TELEPHONE (OUTPATIENT)
Dept: FAMILY MEDICINE CLINIC | Age: 86
End: 2021-08-16

## 2021-08-16 ENCOUNTER — OFFICE VISIT (OUTPATIENT)
Dept: FAMILY MEDICINE CLINIC | Age: 86
End: 2021-08-16
Payer: MEDICARE

## 2021-08-16 VITALS
HEIGHT: 63 IN | TEMPERATURE: 98.4 F | HEART RATE: 65 BPM | DIASTOLIC BLOOD PRESSURE: 70 MMHG | WEIGHT: 160.13 LBS | SYSTOLIC BLOOD PRESSURE: 138 MMHG | BODY MASS INDEX: 28.37 KG/M2 | OXYGEN SATURATION: 98 %

## 2021-08-16 DIAGNOSIS — G89.29 CHRONIC BILATERAL LOW BACK PAIN WITHOUT SCIATICA: Primary | ICD-10-CM

## 2021-08-16 DIAGNOSIS — M54.50 CHRONIC BILATERAL LOW BACK PAIN WITHOUT SCIATICA: Primary | ICD-10-CM

## 2021-08-16 PROCEDURE — 99213 OFFICE O/P EST LOW 20 MIN: CPT | Performed by: INTERNAL MEDICINE

## 2021-08-16 RX ORDER — METHYLPREDNISOLONE 4 MG/1
TABLET ORAL
Qty: 1 KIT | Refills: 0 | Status: SHIPPED
Start: 2021-08-16 | End: 2021-08-17

## 2021-08-16 ASSESSMENT — ENCOUNTER SYMPTOMS
SHORTNESS OF BREATH: 0
DIARRHEA: 0
CONSTIPATION: 0
RHINORRHEA: 0
ABDOMINAL PAIN: 0
NAUSEA: 0
BLOOD IN STOOL: 0
EYE PAIN: 0
COUGH: 0
BACK PAIN: 1
SORE THROAT: 0
VOMITING: 0
CHEST TIGHTNESS: 0

## 2021-08-16 NOTE — PROGRESS NOTES
[facility]   Internal Medicine     2021  Heaven Felton : 1933 Sex: female  Age:87 y.o. Chief Complaint   Patient presents with    Back Pain     started a month ago         HPI:   Patient presents to office for evaluation for the following medical concerns. - States having some back discomfort. States has felt worse over the last 1 month. States ache that is constant. Improved if sits or lies down. Worse with walking and activity. States located to lowe mis back. No radiation. No numbness, tingling. States occastional weakness in legs. No fever or chills. \. On hydrocodone. States taking usually daily. No reported side effects. Helping. Previously declined work up. Would now like work and referral for other treatment options     - States has been fatigued more recently. States Some SOB. No chest pain/discomfort. No lightheaded or dizzy. States worse with activity.     - States labs showed renal insuffiencey. Was not sure if related to new medications or increased blood pressure or both. Has seen nephrology. Medications adjusted. (10/20) - added chlorthialidone 12.5mg, f/u 12 months. Last lab (3/2021) showed slight dysfunction, similar     - States has chronic rhinitis. Stable. States no cough. States nasal congestion. No sore throat. No chest pain, No fever or chills. No nausea or vomiting.     - States has high blood pressure. States does check blood pressure at home occasionally at home, range 120's. On verapamil,  terazosin, On lisinopril. Last visit  (10/20) - added chlorthialidone 12.5mg.      - States has high cholesterol, try's to watch diet, on cholesterol meds - atorvastatin. - States depression symptoms are stable. On Zoloft, helping. No side effects. States  season is a difficult time of year. 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 No side effects reported.  Administration instructions reviewed. Last Lab (3/2021) - stable. - States has urinary incontinence - stable with meds (terazosin). States now having urinary frequency. - States has GERD, on omeprazole - Stable with omeprazole dose. - States has had right total knee arthoplasty for arthritis. Still having right Knee pain. States also has some back pain. States also right wrist pain. States following with ortho (Dr. Candelaria Ruth). States had compression stockings. States also using heating pad to back. States needs refill of meds. Takes Norco mostly at night - helping. No reported side effects or complications reported. Health Maintenance   - immunizations:   Influenza Vaccination - (9/21/2016) , (10/2017), (10/2018), (2019), (9/15/20)   Pneumonia Vaccination - (12/2015) - prevnar, (12/2016) - pneumonoccal  Zoster/Shingles Vaccine  Tetanus Vaccination - (12/15/2015), (1/1/2020) - tdap   covid (1/20/2021) #1, (2/17/2021) #2 - moderna    - Screenings:   Bone Density Scan - (2/28/2019)   Pelvic/Pap Exam  Mammogram - (8/14/2014), (4/2019) - neg     Colonoscopy - (2011) - neg per patient    Couseled on Home Safety - (11/28/2017)    Carotid Artery Study - (3/2016) - <50% b/l, also showed thyroid nodules,  (12/2019) - < 50% b/l     ROS:  Review of Systems   Constitutional: Positive for fatigue. Negative for appetite change, chills, fever and unexpected weight change. HENT: Negative for congestion, rhinorrhea and sore throat. Eyes: Negative for pain and visual disturbance. Respiratory: Negative for cough, chest tightness and shortness of breath. Cardiovascular: Negative for chest pain and palpitations. Gastrointestinal: Negative for abdominal pain, blood in stool, constipation, diarrhea, nausea and vomiting. Genitourinary: Negative for difficulty urinating, dysuria, frequency, pelvic pain, urgency and vaginal bleeding. Musculoskeletal: Positive for back pain. Negative for arthralgias and myalgias.    Skin: Negative for rash. Neurological: Negative for dizziness, syncope, weakness, light-headedness, numbness and headaches. Hematological: Negative for adenopathy. Psychiatric/Behavioral: Negative for suicidal ideas. The patient is not nervous/anxious. Current Outpatient Medications:     methylPREDNISolone (MEDROL DOSEPACK) 4 MG tablet, Take by mouth., Disp: 1 kit, Rfl: 0    HYDROcodone-acetaminophen (NORCO) 5-325 MG per tablet, Take 1 tablet by mouth every 8 hours as needed for Pain for up to 30 days. , Disp: 90 tablet, Rfl: 0    chlorthalidone (HYGROTON) 25 MG tablet, Take 0.5 tablets by mouth daily, Disp: 45 tablet, Rfl: 1    sertraline (ZOLOFT) 100 MG tablet, Take 1 tablet by mouth daily, Disp: 90 tablet, Rfl: 1    lisinopril (PRINIVIL;ZESTRIL) 10 MG tablet, Take 1 tablet by mouth daily, Disp: 90 tablet, Rfl: 1    levothyroxine (SYNTHROID) 75 MCG tablet, Take 1 tablet by mouth Daily, Disp: 90 tablet, Rfl: 1    atorvastatin (LIPITOR) 20 MG tablet, Take 1 tablet by mouth daily, Disp: 90 tablet, Rfl: 1    omeprazole (PRILOSEC) 40 MG delayed release capsule, Take 1 capsule by mouth daily Instructed to take with sip water am of procedure, Disp: 90 capsule, Rfl: 1    terazosin (HYTRIN) 2 MG capsule, Take 1 capsule by mouth nightly, Disp: 90 capsule, Rfl: 1    verapamil (CALAN SR) 240 MG extended release tablet, Take 1 tablet by mouth nightly 1 tablet by mouth in am and 0.5 tablet by mouth in pm, Disp: 90 tablet, Rfl: 1    aspirin 81 MG tablet, Take 81 mg by mouth daily, Disp: , Rfl:     Multiple Vitamins-Minerals (CENTRUM SILVER PO), Take 500 mg by mouth daily, Disp: , Rfl:     ascorbic acid (VITAMIN C) 500 MG tablet, Take 1,000 mg by mouth daily. 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    Presence of right artificial knee joint 5/14/2019    Thyroid disease        Past Surgical History:   Procedure Laterality Date    CHOLECYSTECTOMY      EYE SURGERY      bilat cataract    HYSTERECTOMY      LUMBAR LAMINECTOMY  05/29/2013    L3-L4 with microdiscectomy    DE TOTAL KNEE ARTHROPLASTY Right 5/1/2018    RIGHT KNEE TOTAL ARTHROPLASTY (CHARLES)---PRP---PNB--- performed by Mitzi Carreon [facility] at [facility] OR       Family History   Problem Relation Age of Onset    Cancer Mother         stomach    Heart Attack Father     Coronary Art Dis Father        Social History     Socioeconomic History    Marital status:       Spouse name: Not on file    Number of children: Not on file    Years of education: Not on file    Highest education level: Not on file   Occupational History    Not on file   Tobacco Use    Smoking status: Never Smoker    Smokeless tobacco: Never Used   Substance and Sexual Activity    Alcohol use: No    Drug use: No    Sexual activity: Not on file   Other Topics Concern    Not on file   Social History Narrative    Not on file     Social Determinants of Health     Financial Resource Strain: Unknown    Difficulty of Paying Living Expenses: Patient refused   Food Insecurity: Unknown    Worried About Running Out of Food in the Last Year: Patient refused    Ran Out of Food in the Last Year: Patient refused   Transportation Needs: Unknown    Lack of Transportation (Medical): Patient refused    Lack of Transportation (Non-Medical): Patient refused   Physical Activity:     Days of Exercise per Week:     Minutes of Exercise per Session:    Stress:     Feeling of Stress :    Social Connections:     Frequency of Communication with Friends and Family:     Frequency of Social Gatherings with Friends and Family:     Attends [employer] Services:     Active Member of Clubs or Organizations:     Attends Club or Organization Meetings:     Marital Status:    Intimate Partner Violence:     Fear of Current or Ex-Partner:     Emotionally Abused:     Physically Abused:     Sexually Abused:        Vitals:    08/16/21 0900   BP: 138/70   Site: Left Upper Arm   Position: Sitting   Cuff Size: Medium Adult   Pulse: 65   Temp: 98.4 °F (36.9 °C)   SpO2: 98%   Weight: 160 lb 2 oz (72.6 kg)   Height: 5' 3\" (1.6 m)       Exam:  Physical Exam  Constitutional:       Appearance: She is well-developed. HENT:      Head: Normocephalic and atraumatic. Comments: Facial ecchymosis and swelling      Right Ear: External ear normal.      Left Ear: External ear normal.   Eyes:      Pupils: Pupils are equal, round, and reactive to light. Neck:      Thyroid: No thyromegaly. Vascular: Carotid bruit present. Comments: Right bruit   Cardiovascular:      Rate and Rhythm: Normal rate and regular rhythm. Heart sounds: Murmur heard. Pulmonary:      Effort: Pulmonary effort is normal.      Breath sounds: Normal breath sounds. No wheezing. Abdominal:      General: Bowel sounds are normal. There is no distension. Palpations: Abdomen is soft. Tenderness: There is no abdominal tenderness. There is no guarding or rebound. Musculoskeletal:      Right wrist: Swelling and tenderness present. Decreased range of motion. Right hand: Tenderness present. Decreased range of motion. Cervical back: Normal range of motion and neck supple. Lumbar back: Decreased range of motion. Comments: Ecchymosis and swelling. Pain to palpation. Some pain with ROM. Lymphadenopathy:      Cervical: No cervical adenopathy. Skin:     General: Skin is warm and dry. Neurological:      Mental Status: She is alert and oriented to person, place, and time.       Cranial Nerves: No cranial nerve deficit. Psychiatric:         Judgment: Judgment normal.         Assessment and Plan:    Diagnoses and all orders for this visit:    Chronic bilateral low back pain without sciatica  - uncertain as to cause - most prob OA   - order xray   - home exercises   - discussed physical therapy - declines   - medrol pack x1   - referral to pain management   - continue norco as prescribed     Dyspnea on exertion  - Uncertain etiology at present   - recheck EKG   - has seen cardio  - worsen ER   - refer to cardio for re-eval     Stage 3a chronic kidney disease  - uncertain etiology   - May be multifactorial - new medications, long standing HTN   -  kidney ok   - following with nephrology   - (10/20) - added chlorthialidone 12.5mg  - last lab (3/2021) showed slight dysfunction     Essential hypertension  - monitor blood pressure at home  - watch diet - decreased salt. - on verapamil   - on terasozin   - on lisinopril   - on chlorthialidone 12.5mg   - poss white coat   - improved     Stenosis of right carotid artery  - Last US carotid 12/2019 - . Less than 50% stenosis of the right and left internal carotid artery.     Mixed hyperlipidemia  - watch diet  - on atorvastatin   - follow labs (3/2021) - stable     Current mild episode of major depressive disorder, unspecified whether recurrent ([address]  - on zoloft to 100mg  - no suicidal thoughts  - states family thinks is having issues, but patient thinks is ok (12/20)     Gastroesophageal reflux disease without esophagitis  - watch diet  - on omeprazole 40mg   - stable on meds     Generalized osteoarthritis  - following with ortho  - stable    [place]AR[place] report reviewed (6/29/2021)  Controlled substance agreement updated (3/29/2021)    Controlled Substance Monitoring:    Acute and Chronic Pain Monitoring:   RX Monitoring 6/29/2021   Attestation -   Periodic Controlled Substance Monitoring Possible medication side effects, risk of tolerance/dependence & alternative treatments discussed. ;No signs of potential drug abuse or diversion identified.;Obtaining appropriate analgesic effect of treatment. 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 Orders Placed This Encounter   Procedures    XR LUMBAR SPINE (2-3 VIEWS)     Standing Status:   Future     Standing Expiration Date:   8/16/2022     Order Specific Question:   Reason for exam:     Answer:   acute on chronic low back pain    XR SACRUM COCCYX (MIN 2 VIEWS)     Standing Status:   Future     Standing Expiration Date:   8/16/2022     Order Specific Question:   Reason for exam:     Answer:   acute on chronic low back pain    External Referral To Pain Clinic     Referral Priority:   Routine     Referral Type:   Eval and Treat     Referral Reason:   Specialty Services Required     Referred to Provider:   Zainab Gaviria DO     Requested Specialty:   Pain Management     Number of Visits Requested:   1     Requested Prescriptions     Signed Prescriptions Disp Refills    methylPREDNISolone (MEDROL DOSEPACK) 4 MG tablet 1 kit 0     Sig: Take by mouth.      Aleksandar Salgado MD  8/16/2021  9:46 AM

## 2021-08-16 NOTE — TELEPHONE ENCOUNTER
Please let the patient know that x-ray per radiology report suggested    Severe degenerative joint disease and scoliosis curvature    Degenerative joint disease was most prominent at L1-L3 and L4-L5    Would continue with present treatment recommendations with referral to pain management    Thanks

## 2021-08-16 NOTE — PATIENT INSTRUCTIONS
more stretch, put your other leg flat on the floor while pulling your knee to your chest.    Curl-ups   1. Lie on the floor on your back with your knees bent at a 90-degree angle. Your feet should be flat on the floor, about 12 inches from your buttocks. 2. Cross your arms over your chest. If this bothers your neck, try putting your hands behind your neck (not your head), with your elbows spread apart. 3. Slowly tighten your belly muscles and raise your shoulder blades off the floor. 4. Keep your head in line with your body, and do not press your chin to your chest.  5. Hold this position for 1 or 2 seconds, then slowly lower yourself back down to the floor. 6. Repeat 8 to 12 times. Pelvic tilt exercise   1. Lie on your back with your knees bent. 2. \"Brace\" your stomach. This means to tighten your muscles by pulling in and imagining your belly button moving toward your spine. You should feel like your back is pressing to the floor and your hips and pelvis are rocking back. 3. Hold for about 6 seconds while you breathe smoothly. 4. Repeat 8 to 12 times. Heel dig bridging   1. Lie on your back with both knees bent and your ankles bent so that only your heels are digging into the floor. Your knees should be bent about 90 degrees. 2. Then push your heels into the floor, squeeze your buttocks, and lift your hips off the floor until your shoulders, hips, and knees are all in a straight line. 3. Hold for about 6 seconds as you continue to breathe normally, and then slowly lower your hips back down to the floor and rest for up to 10 seconds. 4. Do 8 to 12 repetitions. Hamstring stretch in doorway   1. Lie on your back in a doorway, with one leg through the open door. 2. Slide your leg up the wall to straighten your knee. You should feel a gentle stretch down the back of your leg. 3. Hold the stretch for at least 15 to 30 seconds. Do not arch your back, point your toes, or bend either knee.  Keep one heel touching the floor and the other heel touching the wall. 4. Repeat with your other leg. 5. Do 2 to 4 times for each leg. Hip flexor stretch   1. Kneel on the floor with one knee bent and one leg behind you. Place your forward knee over your foot. Keep your other knee touching the floor. 2. Slowly push your hips forward until you feel a stretch in the upper thigh of your rear leg. 3. Hold the stretch for at least 15 to 30 seconds. Repeat with your other leg. 4. Do 2 to 4 times on each side. Wall sit   1. Stand with your back 10 to 12 inches away from a wall. 2. Lean into the wall until your back is flat against it. 3. Slowly slide down until your knees are slightly bent, pressing your lower back into the wall. 4. Hold for about 6 seconds, then slide back up the wall. 5. Repeat 8 to 12 times. Follow-up care is a key part of your treatment and safety. Be sure to make and go to all appointments, and call your doctor if you are having problems. It's also a good idea to know your test results and keep a list of the medicines you take. Where can you learn more? Go to [email or web address] org and sign in to your [place]. com. cn account. Enter K507 in the [facility] box to learn more about \"Low Back Pain: Exercises. \"     If you do not have an account, please click on the \"Sign Up Now\" link. Current as of: November 16, 2020               Content Version: 12.9  © 2006-2021 Healthwise, Incorporated. Care instructions adapted under license by [facility] ([facility]). If you have questions about a medical condition or this instruction, always ask your healthcare professional. [place] [address] any warranty or liability for your use of this information.

## 2021-08-17 ENCOUNTER — OFFICE VISIT (OUTPATIENT)
Dept: CARDIOLOGY CLINIC | Age: 86
End: 2021-08-17
Payer: MEDICARE

## 2021-08-17 VITALS
RESPIRATION RATE: 16 BRPM | SYSTOLIC BLOOD PRESSURE: 118 MMHG | BODY MASS INDEX: 28.16 KG/M2 | HEART RATE: 66 BPM | WEIGHT: 158.9 LBS | DIASTOLIC BLOOD PRESSURE: 64 MMHG | HEIGHT: 63 IN

## 2021-08-17 DIAGNOSIS — E78.2 MIXED HYPERLIPIDEMIA: Primary | ICD-10-CM

## 2021-08-17 DIAGNOSIS — I65.21 STENOSIS OF RIGHT CAROTID ARTERY: ICD-10-CM

## 2021-08-17 DIAGNOSIS — I10 ESSENTIAL HYPERTENSION: ICD-10-CM

## 2021-08-17 PROCEDURE — 93000 ELECTROCARDIOGRAM COMPLETE: CPT | Performed by: INTERNAL MEDICINE

## 2021-08-17 PROCEDURE — 99214 OFFICE O/P EST MOD 30 MIN: CPT | Performed by: INTERNAL MEDICINE

## 2021-08-17 RX ORDER — AMOXICILLIN 500 MG/1
CAPSULE ORAL
COMMUNITY
Start: 2021-08-12

## 2021-08-17 NOTE — PROGRESS NOTES
20 MG tablet Take 1 tablet by mouth daily 90 tablet 1    omeprazole (PRILOSEC) 40 MG delayed release capsule Take 1 capsule by mouth daily Instructed to take with sip water am of procedure 90 capsule 1    terazosin (HYTRIN) 2 MG capsule Take 1 capsule by mouth nightly 90 capsule 1    verapamil (CALAN SR) 240 MG extended release tablet Take 1 tablet by mouth nightly 1 tablet by mouth in am and 0.5 tablet by mouth in pm 90 tablet 1    aspirin 81 MG tablet Take 81 mg by mouth daily      Multiple Vitamins-Minerals (CENTRUM SILVER PO) Take 500 mg by mouth daily      ascorbic acid (VITAMIN C) 500 MG tablet Take 1,000 mg by mouth daily.  Cholecalciferol (VITAMIN D3) 1000 units TABS Take 1,000 Units by mouth daily        No current facility-administered medications for this visit. Social History     Socioeconomic History    Marital status:       Spouse name: Not on file    Number of children: Not on file    Years of education: Not on file    Highest education level: Not on file   Occupational History    Not on file   Tobacco Use    Smoking status: Never Smoker    Smokeless tobacco: Never Used   Substance and Sexual Activity    Alcohol use: No    Drug use: No    Sexual activity: Not on file   Other Topics Concern    Not on file   Social History Narrative    Not on file     Social Determinants of Health     Financial Resource Strain: Unknown    Difficulty of Paying Living Expenses: Patient refused   Food Insecurity: Unknown    Worried About Running Out of Food in the Last Year: Patient refused    Ran Out of Food in the Last Year: Patient refused   Transportation Needs: Unknown    Lack of Transportation (Medical): Patient refused    Lack of Transportation (Non-Medical): Patient refused   Physical Activity:     Days of Exercise per Week:     Minutes of Exercise per Session:    Stress:     Feeling of Stress :    Social Connections:     Frequency of Communication with Friends and Family:     Frequency of Social Gatherings with Friends and Family:     Attends [employer] Services:     Active Member of Clubs or Organizations:     Attends Club or Organization Meetings:     Marital Status:    Intimate Partner Violence:     Fear of Current or Ex-Partner:     Emotionally Abused:     Physically Abused:     Sexually Abused:        Family History   Problem Relation Age of Onset    Cancer Mother         stomach    Heart Attack Father     Coronary Art Dis Father      Review of Systems:  Heart: as above   Lungs: as above   Eyes: denies changes in vision or discharge. Ears: denies changes in hearing or pain. Nose: denies epistaxis or masses   Throat: denies sore throat or trouble swallowing. Neuro: denies numbness, tingling, tremors. Skin: denies rashes or itching. : denies hematuria, dysuria   GI: denies vomiting, diarrhea   Psych: denies mood changed, anxiety, depression. Physical Exam   /64   Pulse 66   Resp 16   Ht 5' 3\" (1.6 m)   Wt 158 lb 14.4 oz (72.1 kg)   BMI 28.15 kg/m²   Constitutional: Oriented to person, place, and time. Well-developed and well-nourished. No distress. Head: Normocephalic and atraumatic. Eyes: EOM are normal. Pupils are equal, round, and reactive to light. Neck: Normal range of motion. Neck supple. No hepatojugular reflux and no JVD present. Carotid bruit is not present. No tracheal deviation present. No thyromegaly present. Cardiovascular: Normal rate, regular rhythm, normal heart sounds and intact distal pulses. Exam reveals no gallop and no friction rub. No murmur heard. Pulmonary/Chest: Effort normal and breath sounds normal. No respiratory distress. No wheezes. No rales. No tenderness. Abdominal: Soft. Bowel sounds are normal. No distension and no mass. No tenderness. No rebound and no guarding. Musculoskeletal: Normal range of motion. No edema and no tenderness. Lymphadenopathy:   No cervical adenopathy.  No groin adenopathy. Neurological: Alert and oriented to person, place, and time. Skin: Skin is warm and dry. No rash noted. Not diaphoretic. No erythema. Psychiatric: Normal mood and affect. Behavior is normal.     EKG:  normal sinus rhythm. Echo Summary 7/19/2021:   Ejection fraction is visually estimated at 60%. No regional wall motion abnormalities seen. Normal right ventricle structure and function. Physiologic and/or trace mitral regurgitation is present. ASSESSMENT AND PLAN:  Patient Active Problem List   Diagnosis    Essential hypertension    Hyperlipidemia    GERD (gastroesophageal reflux disease)    Depression    Right lumbar radiculopathy    Abnormal EKG    Arthritis of right knee    Status post right knee replacement    Generalized osteoarthritis    Urinary incontinence    Hurthle cell tumor    Impaired fasting glucose    Vitamin D deficiency    Presence of right artificial knee joint    Long term current use of therapeutic drug    Postoperative hypothyroidism    Dyspnea on exertion    Right foot pain    Stenosis of right carotid artery    Right wrist pain    Acute kidney insufficiency     1. SHERWOOD:     Echo normal.      2. DJD: Under evaluation. Serina Iraheta D.O.   Cardiologist  Cardiology, [facility]

## 2021-08-29 NOTE — TELEPHONE ENCOUNTER
Pt of Dr Emeli Cruz 10-30-19, Gracie Calvert sent new referral for SHERWOOD same diagnosis from last visit, pt would like to see Dr Antonella Wei.   Please call pt with appt info [phone] spontaneous/unlabored

## 2021-09-18 ENCOUNTER — TELEPHONE (OUTPATIENT)
Dept: FAMILY MEDICINE CLINIC | Age: 86
End: 2021-09-18

## 2021-09-18 NOTE — TELEPHONE ENCOUNTER
Please let the patient know that blood work results showed    Labs showed slight kidney dysfunction, results similar when compared to previous    Cholesterol levels were borderline elevated, triglycerides were borderline elevated.   HDL or good cholesterol was borderline to beneficial    Urine analysis showed moderate amount of white cells but otherwise was normal with no evidence of microscopic protein or blood    Vitamin E85 and folic acid levels were normal    Thyroid levels were normal    Vitamin D levels were normal    Hemoglobin A1c, measure 3-month sugar control, was normal    Blood counts were normal    Electrolytes and liver functions were normal    Thanks

## 2021-09-18 NOTE — LETTER
[address] [place] [address]  Phone: [phone]  Fax: [phone]    Madalyn Pang MD        September 20, 2021     [address] [place] [address]      Dear Yashira Mckeon:    Below are the results from your recent visit:    Resulted Orders   Vitamin B12 & Folate   Result Value Ref Range    Vitamin B-12 590 211 - 946 pg/mL    Folate >20.0 4.8 - 24.2 ng/mL   CBC Auto Differential   Result Value Ref Range    WBC 7.5 4.5 - 11.5 E9/L    RBC 4.10 3.50 - 5.50 E12/L    Hemoglobin 12.9 11.5 - 15.5 g/dL    Hematocrit 39.0 34.0 - 48.0 %    MCV 95.1 80.0 - 99.9 fL    MCH 31.5 26.0 - 35.0 pg    MCHC 33.1 32.0 - 34.5 %    RDW 12.5 11.5 - 15.0 fL    Platelets 119 247 - 278 E9/L    MPV 11.7 7.0 - 12.0 fL    Neutrophils % 68.4 43.0 - 80.0 %    Immature Granulocytes % 0.3 0.0 - 5.0 %    Lymphocytes % 21.4 20.0 - 42.0 %    Monocytes % 8.3 2.0 - 12.0 %    Eosinophils % 0.8 0.0 - 6.0 %    Basophils % 0.8 0.0 - 2.0 %    Neutrophils Absolute 5.10 1.80 - 7.30 E9/L    Immature Granulocytes # 0.02 E9/L    Lymphocytes Absolute 1.60 1.50 - 4.00 E9/L    Monocytes Absolute 0.62 0.10 - 0.95 E9/L    Eosinophils Absolute 0.06 0.05 - 0.50 E9/L    Basophils Absolute 0.06 0.00 - 0.20 E9/L   Microalbumin, Ur   Result Value Ref Range    Microalbumin, Random Urine <12.0 Not Established mg/L   Urinalysis   Result Value Ref Range    Color, UA Yellow Straw/Yellow    Clarity, UA Clear Clear    Glucose, Ur Negative Negative mg/dL    Bilirubin Urine Negative Negative    Ketones, Urine Negative Negative mg/dL    Specific Gravity, UA 1.015 1.005 - 1.030    Blood, Urine Negative Negative    pH, UA 7.0 5.0 - 9.0    Protein, UA Negative Negative mg/dL    Urobilinogen, Urine 1.0 <2.0 E.U./dL    Nitrite, Urine Negative Negative    Leukocyte Esterase, Urine MODERATE (A) Negative   TSH without Reflex   Result Value Ref Range    TSH 0.881 0.270 - 4.200 uIU/mL   Vitamin D 25 Hydroxy   Result Value Ref Range    Vit D, 25-Hydroxy 45 30 - 100 ng/mL      Comment:      <20 ng/mL. ........... Lynnette Sharper Deficient  20-30 ng/mL. ......... Lynnette Sharper Insufficient   ng/mL. ........ Lynnette Sharper Sufficient  >100 ng/mL. .......... Lynnette Sharper Toxic     Hemoglobin A1C   Result Value Ref Range    Hemoglobin A1C 5.5 4.0 - 5.6 %   Lipid, Fasting   Result Value Ref Range    Cholesterol, Fasting 190 0 - 199 mg/dL    Triglyceride, Fasting 173 (H) 0 - 149 mg/dL    HDL 57 >40 mg/dL    LDL Calculated 98 0 - 99 mg/dL    VLDL Cholesterol Calculated 35 mg/dL   Magnesium   Result Value Ref Range    Magnesium 2.1 1.6 - 2.6 mg/dL   Comprehensive Metabolic Panel   Result Value Ref Range    Sodium 144 132 - 146 mmol/L    Potassium 4.4 3.5 - 5.0 mmol/L    Chloride 105 98 - 107 mmol/L    CO2 27 22 - 29 mmol/L    Anion Gap 12 7 - 16 mmol/L    Glucose 94 74 - 99 mg/dL    BUN 27 (H) 6 - 23 mg/dL    CREATININE 1.3 (H) 0.5 - 1.0 mg/dL    GFR Non-African American 39 >=60 mL/min/1.73      Comment:      Chronic Kidney Disease: less than 60 ml/min/1.73 sq.m. Kidney Failure: less than 15 ml/min/1.73 sq.m. Results valid for patients 18 years and older. GFR  47     Calcium 9.5 8.6 - 10.2 mg/dL    Total Protein 6.7 6.4 - 8.3 g/dL    Albumin 4.3 3.5 - 5.2 g/dL    Total Bilirubin 0.7 0.0 - 1.2 mg/dL    Alkaline Phosphatase 81 35 - 104 U/L    ALT 13 0 - 32 U/L    AST 20 0 - 31 U/L   Microscopic Urinalysis   Result Value Ref Range    WBC, UA 2-5 0 - 5 /HPF    RBC, UA NONE 0 - 2 /HPF    Epithelial Cells, UA FEW /HPF    Renal Epithelial, UA RARE /HPF    Bacteria, UA NONE SEEN None Seen /HPF     The test results show:    Labs showed slight kidney dysfunction, results similar when compared to previous     Cholesterol levels were borderline elevated, triglycerides were borderline elevated.   HDL or good cholesterol was borderline to beneficial     Urine analysis showed moderate amount of white cells but otherwise was normal with no evidence of microscopic protein or blood     Vitamin P72 and folic acid levels were normal     Thyroid levels were normal     Vitamin D levels were normal     Hemoglobin A1c, measure 3-month sugar control, was normal     Blood counts were normal     Electrolytes and liver functions were normal    If you have any questions or concerns, please don't hesitate to call.     Sincerely,        Coby Peck MD

## 2021-09-27 ENCOUNTER — OFFICE VISIT (OUTPATIENT)
Dept: FAMILY MEDICINE CLINIC | Age: 86
End: 2021-09-27
Payer: MEDICARE

## 2021-09-27 VITALS
OXYGEN SATURATION: 98 % | HEIGHT: 63 IN | HEART RATE: 84 BPM | SYSTOLIC BLOOD PRESSURE: 130 MMHG | BODY MASS INDEX: 27.86 KG/M2 | WEIGHT: 157.25 LBS | TEMPERATURE: 98.7 F | DIASTOLIC BLOOD PRESSURE: 64 MMHG

## 2021-09-27 VITALS
SYSTOLIC BLOOD PRESSURE: 130 MMHG | BODY MASS INDEX: 27.86 KG/M2 | HEART RATE: 84 BPM | DIASTOLIC BLOOD PRESSURE: 64 MMHG | TEMPERATURE: 98.7 F | HEIGHT: 63 IN | WEIGHT: 157.25 LBS | OXYGEN SATURATION: 98 %

## 2021-09-27 DIAGNOSIS — N18.31 STAGE 3A CHRONIC KIDNEY DISEASE (HCC): ICD-10-CM

## 2021-09-27 DIAGNOSIS — Z00.00 ROUTINE GENERAL MEDICAL EXAMINATION AT A HEALTH CARE FACILITY: Primary | ICD-10-CM

## 2021-09-27 DIAGNOSIS — I65.21 STENOSIS OF RIGHT CAROTID ARTERY: ICD-10-CM

## 2021-09-27 DIAGNOSIS — H61.23 BILATERAL IMPACTED CERUMEN: ICD-10-CM

## 2021-09-27 DIAGNOSIS — K21.9 GASTROESOPHAGEAL REFLUX DISEASE WITHOUT ESOPHAGITIS: Chronic | ICD-10-CM

## 2021-09-27 DIAGNOSIS — N39.3 STRESS INCONTINENCE OF URINE: ICD-10-CM

## 2021-09-27 DIAGNOSIS — E89.0 POSTOPERATIVE HYPOTHYROIDISM: ICD-10-CM

## 2021-09-27 DIAGNOSIS — F32.0 CURRENT MILD EPISODE OF MAJOR DEPRESSIVE DISORDER, UNSPECIFIED WHETHER RECURRENT (HCC): Chronic | ICD-10-CM

## 2021-09-27 DIAGNOSIS — Z23 NEED FOR INFLUENZA VACCINATION: Primary | ICD-10-CM

## 2021-09-27 DIAGNOSIS — G89.29 CHRONIC BILATERAL LOW BACK PAIN WITHOUT SCIATICA: ICD-10-CM

## 2021-09-27 DIAGNOSIS — M15.9 GENERALIZED OSTEOARTHRITIS: ICD-10-CM

## 2021-09-27 DIAGNOSIS — M54.50 CHRONIC BILATERAL LOW BACK PAIN WITHOUT SCIATICA: ICD-10-CM

## 2021-09-27 DIAGNOSIS — I10 ESSENTIAL HYPERTENSION: ICD-10-CM

## 2021-09-27 DIAGNOSIS — D34 HURTHLE CELL TUMOR: ICD-10-CM

## 2021-09-27 DIAGNOSIS — R73.01 IMPAIRED FASTING GLUCOSE: ICD-10-CM

## 2021-09-27 DIAGNOSIS — E78.2 MIXED HYPERLIPIDEMIA: Chronic | ICD-10-CM

## 2021-09-27 PROCEDURE — 69209 REMOVE IMPACTED EAR WAX UNI: CPT | Performed by: INTERNAL MEDICINE

## 2021-09-27 PROCEDURE — 99214 OFFICE O/P EST MOD 30 MIN: CPT | Performed by: INTERNAL MEDICINE

## 2021-09-27 PROCEDURE — G0439 PPPS, SUBSEQ VISIT: HCPCS | Performed by: INTERNAL MEDICINE

## 2021-09-27 PROCEDURE — 90694 VACC AIIV4 NO PRSRV 0.5ML IM: CPT | Performed by: INTERNAL MEDICINE

## 2021-09-27 PROCEDURE — G0008 ADMIN INFLUENZA VIRUS VAC: HCPCS | Performed by: INTERNAL MEDICINE

## 2021-09-27 RX ORDER — SERTRALINE HYDROCHLORIDE 100 MG/1
100 TABLET, FILM COATED ORAL DAILY
Qty: 90 TABLET | Refills: 1 | Status: SHIPPED
Start: 2021-09-27 | End: 2022-04-05 | Stop reason: SDUPTHER

## 2021-09-27 RX ORDER — TERAZOSIN 2 MG/1
2 CAPSULE ORAL NIGHTLY
Qty: 90 CAPSULE | Refills: 1 | Status: SHIPPED
Start: 2021-09-27 | End: 2022-04-05 | Stop reason: SDUPTHER

## 2021-09-27 RX ORDER — LEVOTHYROXINE SODIUM 0.07 MG/1
75 TABLET ORAL DAILY
Qty: 90 TABLET | Refills: 1 | Status: SHIPPED
Start: 2021-09-27 | End: 2022-04-05 | Stop reason: SDUPTHER

## 2021-09-27 RX ORDER — LISINOPRIL 10 MG/1
10 TABLET ORAL DAILY
Qty: 90 TABLET | Refills: 1 | Status: SHIPPED
Start: 2021-09-27 | End: 2022-04-05 | Stop reason: SDUPTHER

## 2021-09-27 RX ORDER — VERAPAMIL HYDROCHLORIDE 240 MG/1
240 TABLET, FILM COATED, EXTENDED RELEASE ORAL DAILY
Qty: 90 TABLET | Refills: 1 | Status: SHIPPED
Start: 2021-09-27 | End: 2022-04-05 | Stop reason: SDUPTHER

## 2021-09-27 RX ORDER — HYDROCODONE BITARTRATE AND ACETAMINOPHEN 5; 325 MG/1; MG/1
1 TABLET ORAL EVERY 8 HOURS PRN
Qty: 90 TABLET | Refills: 0 | Status: SHIPPED | OUTPATIENT
Start: 2021-09-27 | End: 2022-01-05 | Stop reason: SDUPTHER

## 2021-09-27 RX ORDER — OMEPRAZOLE 40 MG/1
40 CAPSULE, DELAYED RELEASE ORAL DAILY
Qty: 90 CAPSULE | Refills: 1 | Status: SHIPPED
Start: 2021-09-27 | End: 2022-04-05 | Stop reason: SDUPTHER

## 2021-09-27 RX ORDER — ATORVASTATIN CALCIUM 20 MG/1
20 TABLET, FILM COATED ORAL DAILY
Qty: 90 TABLET | Refills: 1 | Status: SHIPPED
Start: 2021-09-27 | End: 2022-04-05 | Stop reason: SDUPTHER

## 2021-09-27 ASSESSMENT — LIFESTYLE VARIABLES
HOW OFTEN DURING THE LAST YEAR HAVE YOU HAD A FEELING OF GUILT OR REMORSE AFTER DRINKING: 0
HAVE YOU OR SOMEONE ELSE BEEN INJURED AS A RESULT OF YOUR DRINKING: 0
HAS A RELATIVE, FRIEND, DOCTOR, OR ANOTHER HEALTH PROFESSIONAL EXPRESSED CONCERN ABOUT YOUR DRINKING OR SUGGESTED YOU CUT DOWN: 0
AUDIT-C TOTAL SCORE: 1
HOW OFTEN DURING THE LAST YEAR HAVE YOU NEEDED AN ALCOHOLIC DRINK FIRST THING IN THE MORNING TO GET YOURSELF GOING AFTER A NIGHT OF HEAVY DRINKING: 0
HOW OFTEN DO YOU HAVE A DRINK CONTAINING ALCOHOL: 1
HOW OFTEN DURING THE LAST YEAR HAVE YOU FAILED TO DO WHAT WAS NORMALLY EXPECTED FROM YOU BECAUSE OF DRINKING: 0
HOW MANY STANDARD DRINKS CONTAINING ALCOHOL DO YOU HAVE ON A TYPICAL DAY: 0
AUDIT TOTAL SCORE: 1
HOW OFTEN DURING THE LAST YEAR HAVE YOU FOUND THAT YOU WERE NOT ABLE TO STOP DRINKING ONCE YOU HAD STARTED: 0
HOW OFTEN DURING THE LAST YEAR HAVE YOU BEEN UNABLE TO REMEMBER WHAT HAPPENED THE NIGHT BEFORE BECAUSE YOU HAD BEEN DRINKING: 0
HOW OFTEN DO YOU HAVE SIX OR MORE DRINKS ON ONE OCCASION: 0

## 2021-09-27 ASSESSMENT — ENCOUNTER SYMPTOMS
BACK PAIN: 1
ABDOMINAL PAIN: 0
COUGH: 0
NAUSEA: 0
VOMITING: 0
BLOOD IN STOOL: 0
SORE THROAT: 0
EYE PAIN: 0
RHINORRHEA: 0
CONSTIPATION: 0
CHEST TIGHTNESS: 0
DIARRHEA: 0
SHORTNESS OF BREATH: 0

## 2021-09-27 ASSESSMENT — PATIENT HEALTH QUESTIONNAIRE - PHQ9
SUM OF ALL RESPONSES TO PHQ QUESTIONS 1-9: 0
SUM OF ALL RESPONSES TO PHQ QUESTIONS 1-9: 0
1. LITTLE INTEREST OR PLEASURE IN DOING THINGS: 0
2. FEELING DOWN, DEPRESSED OR HOPELESS: 0
SUM OF ALL RESPONSES TO PHQ9 QUESTIONS 1 & 2: 0
SUM OF ALL RESPONSES TO PHQ QUESTIONS 1-9: 0

## 2021-09-27 NOTE — PROGRESS NOTES
Medicare Annual Wellness Visit  Name: Jorge Wayne Date: 2021   MRN: [id number] Sex: Female   Age: 80 y.o. Ethnicity: Non- / Non    : 1933 Race: White (non-)      Yoko Storey is here for Medicare AWV    Screenings for behavioral, psychosocial and functional/safety risks, and cognitive dysfunction are all negative except as indicated below. These results, as well as other patient data from the [address] form, are documented in Flowsheets linked to this Encounter. No Known Allergies      Prior to Visit Medications    Medication Sig Taking? Authorizing Provider   omeprazole (PRILOSEC) 40 MG delayed release capsule Take 1 capsule by mouth daily Instructed to take with sip water am of procedure  Leann Goncalves MD   lisinopril (PRINIVIL;ZESTRIL) 10 MG tablet Take 1 tablet by mouth daily  Leann Goncalves MD   terazosin (HYTRIN) 2 MG capsule Take 1 capsule by mouth nightly  Leann Goncalves MD   levothyroxine (SYNTHROID) 75 MCG tablet Take 1 tablet by mouth Daily  Leann Goncalves MD   sertraline (ZOLOFT) 100 MG tablet Take 1 tablet by mouth daily  Leann Goncalves MD   HYDROcodone-acetaminophen (NORCO) 5-325 MG per tablet Take 1 tablet by mouth every 8 hours as needed for Pain for up to 30 days.   Leann Goncalves MD   atorvastatin (LIPITOR) 20 MG tablet Take 1 tablet by mouth daily  Leann Goncalves MD   verapamil (CALAN SR) 240 MG extended release tablet Take 1 tablet by mouth daily  Leann Goncalves MD   amoxicillin (AMOXIL) 500 MG capsule 4 tabs 1 hr prior to dental appt  Historical Provider, MD   chlorthalidone (HYGROTON) 25 MG tablet Take 0.5 tablets by mouth daily  Leann Goncalves MD   verapamil (CALAN SR) 240 MG extended release tablet Take 1 tablet by mouth nightly 1 tablet by mouth in am and 0.5 tablet by mouth in pm  Patient taking differently: Take 240 mg by mouth daily   Leann Goncalves MD   aspirin 81 MG tablet Take 81 mg by mouth daily Historical Provider, MD   Multiple Vitamins-Minerals (CENTRUM SILVER PO) Take 500 mg by mouth daily  Historical Provider, MD   ascorbic acid (VITAMIN C) 500 MG tablet Take 1,000 mg by mouth daily. 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    Depression     GERD (gastroesophageal reflux disease)     Hyperlipemia     Hypertension     Impaired fasting glucose 5/14/2019    Postoperative hypothyroidism 5/14/2019    Presence of right artificial knee joint 5/14/2019    Thyroid disease        Past Surgical History:   Procedure Laterality Date    CHOLECYSTECTOMY      EYE SURGERY      bilat cataract    HYSTERECTOMY      LUMBAR LAMINECTOMY  05/29/2013    L3-L4 with microdiscectomy    CA TOTAL KNEE ARTHROPLASTY Right 5/1/2018    RIGHT KNEE TOTAL ARTHROPLASTY (CHARLES)---PRP---PNB--- performed by Ruddy [facility]rasquillo [facility] at [facility] OR         Family History   Problem Relation Age of Onset    Cancer Mother         stomach    Heart Attack Father     Coronary Art Dis Father        CareTeam (Including outside providers/suppliers regularly involved in providing care):   Patient Care Team:  Raquel Muhammad MD as PCP - General (Internal Medicine)  Raquel Muhammad MD as PCP - [facility] Empaneled Provider  Rosales Villafuerte DO as Consulting Physician (Cardiology)    Wt Readings from Last 3 Encounters:   09/27/21 157 lb 4 oz (71.3 kg)   09/27/21 157 lb 4 oz (71.3 kg)   08/17/21 158 lb 14.4 oz (72.1 kg)     Vitals:    09/27/21 1537   BP: 130/64   Site: Left Upper Arm   Position: Sitting   Cuff Size: Medium Adult   Pulse: 84   Temp: 98.7 °F (37.1 °C)   SpO2: 98%   Weight: 157 lb 4 oz (71.3 kg)   Height: 5' 3\" (1.6 m)     Body mass index is 27.86 kg/m².     Based upon direct observation of the patient, evaluation of cognition reveals recent and remote memory intact. Patient's complete Health Risk Assessment and screening values have been reviewed and are found in Flowsheets. The following problems were reviewed today and where indicated follow up appointments were made and/or referrals ordered. Positive Risk Factor Screenings with Interventions:            General Health and ACP:  General  In general, how would you say your health is?: Good  In the past 7 days, have you experienced any of the following? New or Increased Pain, New or Increased Fatigue, Loneliness, Social Isolation, Stress or Anger?: (!) Loneliness  Do you get the social and emotional support that you need?: Yes  Do you have a Living Will?: Yes  Advance Directives     Power of  Living Will ACP-Advance Directive ACP-Power of     Not on File [place] on 04/25/18 Filed Not on File      General Health Risk Interventions:  · Loneliness: patient declines any further intervention for this issue      Safety:  Safety  Do you have working smoke detectors?: Yes  Have all throw rugs been removed or fastened?: Yes  Do you have non-slip mats or surfaces in all bathtubs/showers?: (!) No  Do all of your stairways have a railing or banister?: Yes  Are your doorways, halls and stairs free of clutter?: Yes  Do you always fasten your seatbelt when you are in a car?: Yes  Safety Interventions:  · Home safety tips provided    ADL:  ADLs  In the past 7 days, did you need help from others to perform any of the following everyday activities? Eating, dressing, grooming, bathing, toileting, or walking/balance?: None  In the past 7 days, did you need help from others to take care of any of the following?  Laundry, housekeeping, banking/finances, shopping, telephone use, food preparation, transportation, or taking medications?: (!) Banking/Finances  ADL Interventions:  · Patient declines any further evaluation/treatment for this issue  · daughter helps Personalized Preventive Plan   Current Health Maintenance Status  Immunization History   Administered Date(s) Administered    COVID-19, Moderna, PF, 100mcg/0.5mL 01/20/2021, 02/17/2021    Influenza Vaccine, unspecified formulation 09/22/2015, 09/21/2016, 09/08/2017    Influenza Virus Vaccine 09/22/2015, 09/21/2016, 09/08/2017, 09/15/2020    Influenza, Quadv, adjuvanted, 65 yrs +, IM, PF (Fluad) 09/27/2021    Influenza, Triv, inactivated, subunit, adjuvanted, IM (Fluad 65 yrs and older) 09/14/2018, 09/10/2019    Pneumococcal Conjugate 13-valent ([id number]) 12/18/2015    Pneumococcal Polysaccharide ([id number]) 12/06/2016    Tdap (Boostrix, Adacel) 12/15/2015, 01/01/2020        Health Maintenance   Topic Date Due    Shingles Vaccine (1 of 2) Never done   [employer] Visit (AWV)  Never done    Lipid screen  09/15/2022    TSH testing  09/15/2022    Potassium monitoring  09/15/2022    Creatinine monitoring  09/15/2022    DTaP/Tdap/Td vaccine (3 - Td or Tdap) 01/01/2030    Flu vaccine  Completed    Pneumococcal 65+ years Vaccine  Completed    COVID-19 Vaccine  Completed    Hepatitis A vaccine  Aged Out    Hepatitis B vaccine  Aged Out    Hib vaccine  Aged Out    Meningococcal (ACWY) vaccine  Aged Out     Recommendations for [employer] Due: see orders and patient instructions/AVS.  . Recommended screening schedule for the next 5-10 years is provided to the patient in written form: see Patient Instructions/AVS.    Kevin Huizar was seen today for medicare awv.     Diagnoses and all orders for this visit:    Routine general medical examination at a health care facility             Health Maintenance   - immunizations:   Influenza Vaccination - (9/21/2016) , (10/2017), (10/2018), (2019), (9/15/20), (2021)  Pneumonia Vaccination - (12/2015) - prevnar, (12/2016) - pneumonoccal  Zoster/Shingles Vaccine  Tetanus Vaccination - (12/15/2015), (1/1/2020) - tdap   covid (1/20/2021) #1, (2/17/2021) #2 - moderna    - Screenings:   Bone Density Scan - (2/28/2019)   Pelvic/Pap Exam  Mammogram - (8/14/2014), (4/2019) - neg     Colonoscopy - (2011) - neg per patient    Return for Medicare Annual Wellness Visit in 1 year. No orders of the defined types were placed in this encounter.       Requested Prescriptions      No prescriptions requested or ordered in this encounter     Electronically signed by Aris Garland MD on 9/27/2021 at 4:05 PM

## 2021-09-27 NOTE — PATIENT INSTRUCTIONS
Personalized Preventive Plan for Yesi Candelaria - 9/27/2021  Medicare offers a range of preventive health benefits. Some of the tests and screenings are paid in full while other may be subject to a deductible, co-insurance, and/or copay. Some of these benefits include a comprehensive review of your medical history including lifestyle, illnesses that may run in your family, and various assessments and screenings as appropriate. After reviewing your medical record and screening and assessments performed today your provider may have ordered immunizations, labs, imaging, and/or referrals for you. A list of these orders (if applicable) as well as your Preventive Care list are included within your After Visit Summary for your review. Other Preventive Recommendations:    · A preventive eye exam performed by an eye specialist is recommended every 1-2 years to screen for glaucoma; cataracts, macular degeneration, and other eye disorders. · A preventive dental visit is recommended every 6 months. · Try to get at least 150 minutes of exercise per week or 10,000 steps per day on a pedometer . · Order or download the FREE \"Exercise & Physical Activity: Your Everyday Guide\" from [employer]. Call 0-966.362.1056 or search [employer] online. · You need 2631-9139 mg of calcium and 5112-9867 IU of vitamin D per day. It is possible to meet your calcium requirement with diet alone, but a vitamin D supplement is usually necessary to meet this goal.  · When exposed to the sun, use a sunscreen that protects against both UVA and UVB radiation with an SPF of 30 or greater. Reapply every 2 to 3 hours or after sweating, drying off with a towel, or swimming. · Always wear a seat belt when traveling in a car. Always wear a helmet when riding a bicycle or motorcycle. Heart-Healthy Diet   Sodium, Fat, and Cholesterol Controlled Diet       What Is a Heart Healthy Diet?    A heart-healthy diet is one that limits sodium , certain types of fat , and cholesterol . This type of diet is recommended for:   People with any form of cardiovascular disease (eg, coronary heart disease , peripheral vascular disease , previous heart attack , previous stroke )   People with risk factors for cardiovascular disease, such as high blood pressure , high cholesterol , or diabetes   Anyone who wants to lower their risk of developing cardiovascular disease   Sodium    Sodium is a mineral found in many foods. In general, most people consume much more sodium than they need. Diets high in sodium can increase blood pressure and lead to edema (water retention). On a heart-healthy diet, you should consume no more than 2,300 mg (milligrams) of sodium per dayabout the amount in one teaspoon of table salt. The foods highest in sodium include table salt (about 50% sodium), processed foods, convenience foods, and preserved foods. Cholesterol    Cholesterol is a fat-like, waxy substance in your blood. Our bodies make some cholesterol. It is also found in animal products, with the highest amounts in fatty meat, egg yolks, whole milk, cheese, shellfish, and organ meats. On a heart-healthy diet, you should limit your cholesterol intake to less than 200 mg per day. It is normal and important to have some cholesterol in your bloodstream. But too much cholesterol can cause plaque to build up within your arteries, which can eventually lead to a heart attack or stroke. The two types of cholesterol that are most commonly referred to are:   Low-density lipoprotein (LDL) cholesterol  Also known as bad cholesterol, this is the cholesterol that tends to build up along your arteries. Bad cholesterol levels are increased by eating fats that are saturated or hydrogenated. Optimal level of this cholesterol is less than 100. Over 130 starts to get risky for heart disease.    High-density lipoprotein (HDL) cholesterol  Also known as good cholesterol, this type of cholesterol actually carries cholesterol away from your arteries and may, therefore, help lower your risk of having a heart attack. You want this level to be high (ideally greater than 60). It is a risk to have a level less than 40. You can raise this good cholesterol by eating olive oil, canola oil, avocados, or nuts. Exercise raises this level, too. Fat    Fat is calorie dense and packs a lot of calories into a small amount of food. Even though fats should be limited due to their high calorie content, not all fats are bad. In fact, some fats are quite healthful. Fat can be broken down into four main types. The good-for-you fats are:   Monounsaturated fat  found in oils such as olive and canola, avocados, and nuts and natural nut butters; can decrease cholesterol levels, while keeping levels of HDL cholesterol high   Polyunsaturated fat  found in oils such as safflower, sunflower, soybean, corn, and sesame; can decrease total cholesterol and LDL cholesterol   Omega-3 fatty acids  particularly those found in fatty fish (such as salmon, trout, tuna, mackerel, herring, and sardines); can decrease risk of arrhythmias, decrease triglyceride levels, and slightly lower blood pressure   The fats that you want to limit are:   Saturated fat  found in animal products, many fast foods, and a few vegetables; increases total blood cholesterol, including LDL levels   Animal fats that are saturated include: butter, lard, whole-milk dairy products, meat fat, and poultry skin   Vegetable fats that are saturated include: hydrogenated shortening, palm oil, coconut oil, cocoa butter   Hydrogenated or trans fat  found in margarine and vegetable shortening, most shelf stable snack foods, and fried foods; increases LDL and decreases HDL     It is generally recommended that you limit your total fat for the day to less than 30% of your total calories.  If you follow an 1800-calorie heart healthy diet, for example, this would mean 60 grams of fat or less per day. Saturated fat and trans fat in your diet raises your blood cholesterol the most, much more than dietary cholesterol does. For this reason, on a heart-healthy diet, less than 7% of your calories should come from saturated fat and ideally 0% from trans fat. On an 1800-calorie diet, this translates into less than 14 grams of saturated fat per day, leaving 46 grams of fat to come from mono- and polyunsaturated fats.    Food Choices on a Heart Healthy Diet   Food Category   Foods Recommended   Foods to Avoid   Grains   Breads and rolls without salted tops Most dry and cooked cereals Unsalted crackers and breadsticks Low-sodium or homemade breadcrumbs or stuffing All rice and pastas   Breads, rolls, and crackers with salted tops High-fat baked goods (eg, muffins, donuts, pastries) Quick breads, self-rising flour, and biscuit mixes Regular bread crumbs Instant hot cereals Commercially prepared rice, pasta, or stuffing mixes   Vegetables   Most fresh, frozen, and low-sodium canned vegetables Low-sodium and salt-free vegetable juices Canned vegetables if unsalted or rinsed   Regular canned vegetables and juices, including sauerkraut and pickled vegetables Frozen vegetables with sauces Commercially prepared potato and vegetable mixes   Fruits   Most fresh, frozen, and canned fruits All fruit juices   Fruits processed with salt or sodium   Milk   Nonfat or low-fat (1%) milk Nonfat or low-fat yogurt Cottage cheese, low-fat ricotta, cheeses labeled as low-fat and low-sodium   Whole milk Reduced-fat (2%) milk Malted and chocolate milk Full fat yogurt Most cheeses (unless low-fat and low salt) Buttermilk (no more than 1 cup per week)   Meats and Beans   Lean cuts of fresh or frozen beef, veal, lamb, or pork (look for the word loin) Fresh or frozen poultry without the skin Fresh or frozen fish and some shellfish Egg whites and egg substitutes (Limit whole eggs to three per week) Tofu Nuts or seeds (unsalted, dry-roasted), low-sodium peanut butter Dried peas, beans, and lentils   Any smoked, cured, salted, or canned meat, fish, or poultry (including cassidy, chipped beef, cold cuts, hot dogs, sausages, sardines, and anchovies) Poultry skins Breaded and/or fried fish or meats Canned peas, beans, and lentils Salted nuts   Fats and Oils   Olive oil and canola oil Low-sodium, low-fat salad dressings and mayonnaise   Butter, margarine, coconut and palm oils, cassidy fat   Snacks, Sweets, and Condiments   Low-sodium or unsalted versions of broths, soups, soy sauce, and condiments Pepper, herbs, and spices; vinegar, lemon, or lime juice Low-fat frozen desserts (yogurt, sherbet, fruit bars) Sugar, cocoa powder, honey, syrup, jam, and preserves Low-fat, trans-fat free cookies, cakes, and pies Rob and animal crackers, fig bars, gulshan snaps   High-fat desserts Broth, soups, gravies, and sauces, made from instant mixes or other high-sodium ingredients Salted snack foods Canned olives Meat tenderizers, seasoning salt, and most flavored vinegars   Beverages   Low-sodium carbonated beverages Tea and coffee in moderation Soy milk   Commercially softened water   Suggestions   Make whole grains, fruits, and vegetables the base of your diet. Choose heart-healthy fats such as canola, olive, and flaxseed oil, and foods high in heart-healthy fats, such as nuts, seeds, soybeans, tofu, and fish. Eat fish at least twice per week; the fish highest in omega-3 fatty acids and lowest in mercury include salmon, herring, mackerel, sardines, and canned chunk light tuna. If you eat fish less than twice per week or have high triglycerides, talk to your doctor about taking fish oil supplements. Read food labels.    For products low in fat and cholesterol, look for fat free, low-fat, cholesterol free, saturated fat free, and trans fat freeAlso scan the Nutrition Facts Label, which lists saturated fat, trans fat, and cholesterol amounts. For products low in sodium, look for sodium free, very low sodium, low sodium, no added salt, and unsalted   Skip the salt when cooking or at the table; if food needs more flavor, get creative and try out different herbs and spices. Garlic and onion also add substantial flavor to foods. Trim any visible fat off meat and poultry before cooking, and drain the fat off after munoz. Use cooking methods that require little or no added fat, such as grilling, boiling, baking, poaching, broiling, roasting, steaming, stir-frying, and sauting. Avoid fast food and convenience food. They tend to be high in saturated and trans fat and have a lot of added salt. Talk to a registered dietitian for individualized diet advice. Last Reviewed: March 2011 Bruce Lanier MS, MPH, RD   Updated: 3/29/2011   ·     Keep Your Memory [place]       Many factors can affect your ability to remembera hectic lifestyle, aging, stress, chronic disease, and certain medicines. But, there are steps you can take to sharpen your mind and help preserve your memory. Challenge Your Brain   Regularly challenging your mind may help keeps it in top shape. Good mental exercises include:   Crossword puzzlesUse a dictionary if you need it; you will learn more that way. Brainteasers Try some! Crafts, such as wood working and sewing   Hobbies, such as gardening and building model airplanes   SocializingVisit old friends or join groups to meet new ones. Reading   Learning a new language   Taking a class, whether it be art history or mazin chi   TravelingExperience the food, history, and culture of your destination   Learning to use a computer   Going to museums, the theater, or thought-provoking movies   Changing things in your daily life, such as reversing your pattern in the grocery store or brushing your teeth using your nondominant hand   Use Memory Aids   There is no need to remember every detail on your own.  These memory aids can help:   Calendars and day planners   Electronic organizers to store all sorts of helpful informationThese devices can \"beep\" to remind you of appointments. A book of days to record birthdays, anniversaries, and other occasions that occur on the same date every year   Detailed \"to-do\" lists and strategically placed sticky notes   Quick \"study\" sessionsBefore a gathering, review who will be there so their names will be fresh in your mind. Establish routinesFor example, keep your keys, wallet, and umbrella in the same place all the time or take medicine with your 8:00 AM glass of juice   Live a Healthy Life   Many actions that will keep your body strong will do the same for your mind. For example:   Talk to Your Doctor About Herbs and Supplements    Malnutrition and vitamin deficiencies can impair your mental function. For example, vitamin B12 deficiency can cause a range of symptoms, including confusion. But, what if your nutritional needs are being met? Can herbs and supplements still offer a benefit? Researchers have investigated a range of natural remedies, such as ginkgo , ginseng , and the supplement phosphatidylserine (PS). So far, though, the evidence is inconsistent as to whether these products can improve memory or thinking. If you are interested in taking herbs and supplements, talk to your doctor first because they may interact with other medicines that you are taking. Exercise Regularly    Among the many benefits of regular exercise are increased blood flow to the brain and decreased risk of certain diseases that can interfere with memory function. One study found that even moderate exercise has a beneficial effect. Examples of \"moderate\" exercise include:   Playing 18 holes of golf once a week, without a cart   Playing tennis twice a week   Walking one mile per day   Manage Stress    It can be tough to remember what is important when your mind is cluttered.  Make time for relaxation. Choose activities that calm you down, and make it routine. Manage Chronic Conditions    Side effects of high blood pressure , diabetes, and heart disease can interfere with mental function. Many of the lifestyle steps discussed here can help manage these conditions. Strive to eat a healthy diet, exercise regularly, get stress under control, and follow your doctor's advice for your condition. Minimize Medications    Talk to your doctor about the medicines that you take. Some may be unnecessary. Also, healthy lifestyle habits may lower the need for certain drugs. Last Reviewed: April 2010 Caprice Reynaga MD   Updated: 4/13/2010   ·     [address]       As we get older, changes in balance, gait, strength, vision, hearing, and cognition make even the most youthful senior more prone to accidents. Falls are one of the leading health risks for older people. This increased risk of falling is related to:   Aging process (eg, decreased muscle strength, slowed reflexes)   Higher incidence of chronic health problems (eg, arthritis, diabetes) that may limit mobility, agility or sensory awareness   Side effects of medicine (eg, dizziness, blurred vision)especially medicines like prescription pain medicines and drugs used to treat mental health conditions   Depending on the brittleness of your bones, the consequences of a fall can be serious and long lasting. Home Life   Research by the Association of Aging [facility]) shows that some home accidents among older adults can be prevented by making simple lifestyle changes and basic modifications and repairs to the home environment. Here are some lifestyle changes that experts recommend:   Have your hearing and vision checked regularly. Be sure to wear prescription glasses that are right for you. Speak to your doctor or pharmacist about the possible side effects of your medicines. A number of medicines can cause dizziness.    If you have problems with sleep, talk to your doctor. Limit your intake of alcohol. If necessary, use a cane or walker to help maintain your balance. Wear supportive, rubber-soled shoes, even at home. If you live in a region that gets wintry weather, you may want to put special cleats on your shoes to prevent you from slipping on the snow and ice. Exercise regularly to help maintain muscle tone, agility, and balance. Always hold the banister when going up or down stairs. Also, use  bars when getting in or out of the bath or shower, or using the toilet. To avoid dizziness, get up slowly from a lying down position. Sit up first, dangling your legs for a minute or two before rising to a standing position. Overall Home Safety Check   According to the Consumer [employer] Safety Commision's \"Older Consumer Home Safety Checklist,\" it is important to check for potential hazards in each room. And remember, proper lighting is an essential factor in home safety. If you cannot see clearly, you are more likely to fall. Important questions to ask yourself include:   Are lamp, electric, extension, and telephone cords placed out of the flow of traffic and maintained in good condition? Have frayed cords been replaced? Are all small rugs and runners slip resistant? If not, you can secure them to the floor with a special double-sided carpet tape. Are smoke detectors properly locatedone on every floor of your home and one outside of every sleeping area? Are they in good working order? Are batteries replaced at least once a year? Do you have a well-maintained carbon monoxide detector outside every sleeping are in your home? Does your furniture layout leave plenty of space to maneuver between and around chairs, tables, beds, and sofas? Are hallways, stairs and passages between rooms well lit? Can you reach a lamp without getting out of bed? Are floor surfaces well maintained?  Shag rugs, high-pile carpeting, tile floors, and polished mobility, bathtub transfer benches allow you to slide safely into the tub. Raised toilet seats and toilet safety rails are helpful for those with knee or hip problems. In the [facility]    Make sure you use a nightlight and that the area around your bed is clear of potential obstacles. Be careful with electric blankets and never go to sleep with a heating pad, which can cause serious burns even if on a low setting. Use fire-resistant mattress covers and pillows, and NEVER smoke in bed. Keep a phone next to the bed that is programmed to dial 911 at the push of a button. If you have a chronic condition, you may want to sign on with an automatic call-in service. Typically the system includes a small pendant that connects directly to an emergency medical voice-response system. You should also make arrangements to stay in contact with someonefriend, neighbor, family memberon a regular schedule. Fire Prevention   According to the [employer] (Smoke Alarms for Every) [address] [place] [address], senior citizens are one of the two highest risk groups for death and serious injuries due to residential fires. When cooking, wear short-sleeved items, never a bulky long-sleeved robe. The [facility]'s Safety Checklist for Older Consumers emphasizes the importance of checking basements, garages, workshops and storage areas for fire hazards, such as volatile liquids, piles of old rags or clothing and overloaded circuits. Never smoke in bed or when lying down on a couch or recliner chair. Small portable electric or kerosene heaters are responsible for many home fires and should be used cautiously if at all. If you do use one, be sure to keep them away from flammable materials. In case of fire, make sure you have a pre-established emergency exit plan. Have a professional check your fireplace and other fuel-burning appliances yearly.     Helping Hands   Baby boomers entering the villasenor years will continue to see the development of new products to help older adults live safely and independently in spite of age-related changes. Making Life More Livable  , by  [place], lists over 1,000 products for \"living well in the mature years,\" such as bathing and mobility aids, household security devices, ergonomically designed knives and peelers, and faucet valves and knobs for temperature control. Medical supply stores and organizations are good sources of information about products that improve your quality of life and insure your safety.      Last Reviewed: November 2009 Stephanie Everett MD   Updated: 3/7/2011     ·

## 2021-09-27 NOTE — PROGRESS NOTES
[facility]) Physicians   Internal Medicine     2021  Yesi Candelaria : 1933 Sex: female  Age:88 y.o. Chief Complaint   Patient presents with    3 Month Follow-Up     Left ear \"feels funny sometimes\"    Hyperlipidemia    Hypertension    Gastroesophageal Reflux        HPI:   Patient presents to office for evaluation for the following medical concerns. - States having some back discomfort. States has felt worse over the last 1 month. States ache that is constant. Improved if sits or lies down. Worse with walking and activity. States located to lowe mis back. No radiation. No numbness, tingling. States occastional weakness in legs. No fever or chills. On hydrocodone. States taking usually daily. No reported side effects. Helping. Following with pain management. States has had  () - median nerve branch block L4-5 and L5-S1, helped. States has decreased hydrocodone that has had to take. -  has been fatigued more recently. States Some SOB. No chest pain/discomfort. No lightheaded or dizzy. States worse with activity.     - States labs showed renal insuffiencey. Was not sure if related to new medications or increased blood pressure or both. Has seen nephrology. Medications adjusted. (10/20) - added chlorthialidone 12.5mg, f/u 12 months. Last lab (2021) showed slight change in dysfunction.     -  has chronic rhinitis. Stable. States no cough. States nasal congestion. No sore throat. No chest pain, No fever or chills. No nausea or vomiting.     - States has high blood pressure.  does check blood pressure at home occasionally at home, range 120's. On verapamil,  terazosin, On lisinopril. Last visit  (10/20) - added chlorthialidone 12.5mg.      - States has high cholesterol, try's to watch diet, on cholesterol meds - atorvastatin. - States depression symptoms are stable. On Zoloft, helping. No side effects. States  season is a difficult time of year.  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 No side effects reported. Administration instructions reviewed. Last Lab (3/2021) - stable. - States has urinary incontinence - stable with meds (terazosin). States now having urinary frequency. - States has GERD, on omeprazole - Stable with omeprazole dose. - States has had right total knee arthoplasty for arthritis. Still having right Knee pain. States also has some back pain. States also right wrist pain. States following with ortho (Dr. Tonny Alcazar). States had compression stockings. States also using heating pad to back. States needs refill of meds. Takes Norco mostly at night - helping. No reported side effects or complications reported. Health Maintenance   - immunizations:   Influenza Vaccination - (9/21/2016) , (10/2017), (10/2018), (2019), (9/15/20), (2021)  Pneumonia Vaccination - (12/2015) - prevnar, (12/2016) - pneumonoccal  Zoster/Shingles Vaccine  Tetanus Vaccination - (12/15/2015), (1/1/2020) - tdap   covid (1/20/2021) #1, (2/17/2021) #2 - moderna    - Screenings:   Bone Density Scan - (2/28/2019)   Pelvic/Pap Exam  Mammogram - (8/14/2014), (4/2019) - neg     Colonoscopy - (2011) - neg per patient    Couseled on Home Safety - (11/28/2017)    Carotid Artery Study - (3/2016) - <50% b/l, also showed thyroid nodules,  (12/2019) - < 50% b/l     ROS:  Review of Systems   Constitutional: Positive for fatigue. Negative for appetite change, chills, fever and unexpected weight change. HENT: Negative for congestion, rhinorrhea and sore throat. Eyes: Negative for pain and visual disturbance. Respiratory: Negative for cough, chest tightness and shortness of breath. Cardiovascular: Negative for chest pain and palpitations. Gastrointestinal: Negative for abdominal pain, blood in stool, constipation, diarrhea, nausea and vomiting.    Genitourinary: Negative for difficulty urinating, dysuria, frequency, pelvic pain, urgency and vaginal bleeding. Musculoskeletal: Positive for back pain. Negative for arthralgias and myalgias. Skin: Negative for rash. Neurological: Negative for dizziness, syncope, weakness, light-headedness, numbness and headaches. Hematological: Negative for adenopathy. Psychiatric/Behavioral: Negative for suicidal ideas. The patient is not nervous/anxious. Current Outpatient Medications:     omeprazole (PRILOSEC) 40 MG delayed release capsule, Take 1 capsule by mouth daily Instructed to take with sip water am of procedure, Disp: 90 capsule, Rfl: 1    lisinopril (PRINIVIL;ZESTRIL) 10 MG tablet, Take 1 tablet by mouth daily, Disp: 90 tablet, Rfl: 1    terazosin (HYTRIN) 2 MG capsule, Take 1 capsule by mouth nightly, Disp: 90 capsule, Rfl: 1    levothyroxine (SYNTHROID) 75 MCG tablet, Take 1 tablet by mouth Daily, Disp: 90 tablet, Rfl: 1    sertraline (ZOLOFT) 100 MG tablet, Take 1 tablet by mouth daily, Disp: 90 tablet, Rfl: 1    HYDROcodone-acetaminophen (NORCO) 5-325 MG per tablet, Take 1 tablet by mouth every 8 hours as needed for Pain for up to 30 days. , Disp: 90 tablet, Rfl: 0    atorvastatin (LIPITOR) 20 MG tablet, Take 1 tablet by mouth daily, Disp: 90 tablet, Rfl: 1    verapamil (CALAN SR) 240 MG extended release tablet, Take 1 tablet by mouth daily, Disp: 90 tablet, Rfl: 1    amoxicillin (AMOXIL) 500 MG capsule, 4 tabs 1 hr prior to dental appt, Disp: , Rfl:     chlorthalidone (HYGROTON) 25 MG tablet, Take 0.5 tablets by mouth daily, Disp: 45 tablet, Rfl: 1    verapamil (CALAN SR) 240 MG extended release tablet, Take 1 tablet by mouth nightly 1 tablet by mouth in am and 0.5 tablet by mouth in pm (Patient taking differently: Take 240 mg by mouth daily ), Disp: 90 tablet, Rfl: 1    aspirin 81 MG tablet, Take 81 mg by mouth daily, Disp: , Rfl:     Multiple Vitamins-Minerals (CENTRUM SILVER PO), Take 500 mg by mouth daily, Disp: , Rfl:    ascorbic acid (VITAMIN C) 500 MG tablet, Take 1,000 mg by mouth daily. 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    Presence of right artificial knee joint 5/14/2019    Thyroid disease        Past Surgical History:   Procedure Laterality Date    CHOLECYSTECTOMY      EYE SURGERY      bilat cataract    HYSTERECTOMY      LUMBAR LAMINECTOMY  05/29/2013    L3-L4 with microdiscectomy    ID TOTAL KNEE ARTHROPLASTY Right 5/1/2018    RIGHT KNEE TOTAL ARTHROPLASTY ([facility])---PRP---PN[facility]--- performed by Shannon Sharpe MD at [facility] OR       Family History   Problem Relation Age of Onset    Cancer Mother         stomach    Heart Attack Father     Coronary Art Dis Father        Social History     Socioeconomic History    Marital status:       Spouse name: Not on file    Number of children: Not on file    Years of education: Not on file    Highest education level: Not on file   Occupational History    Not on file   Tobacco Use    Smoking status: Never Smoker    Smokeless tobacco: Never Used   Substance and Sexual Activity    Alcohol use: No    Drug use: No    Sexual activity: Not on file   Other Topics Concern    Not on file   Social History Narrative    Not on file     Social Determinants of Health     Financial Resource Strain: Unknown    Difficulty of Paying Living Expenses: Patient refused   Food Insecurity: Unknown    Worried About Running Out of Food in the Last Year: Patient refused    [address] in the Last Year: Patient refused   Transportation Needs: Unknown    Lack of Neurological:      Mental Status: She is alert and oriented to person, place, and time. Cranial Nerves: No cranial nerve deficit. Psychiatric:         Judgment: Judgment normal.         Assessment and Plan:    Diagnoses and all orders for this visit:    Bilateral impacted cerumen  - attempt lavage     Chronic bilateral low back pain without sciatica  - uncertain as to cause - most prob OA   - home exercises   - discussed physical therapy - declines   - following with pain management   - continue norco as prescribed - states has been able to decrease (9/2021)   - s/p median nerve branch block L4-5 and L5-S1, helped (9/2021)   - improved     Stage 3a chronic kidney disease  - uncertain etiology   - May be multifactorial - new medications, long standing HTN   -  kidney ok   - no currently following with nephrology   - (10/20) - added chlorthialidone 12.5mg  - last lab (9/2021) showed slight change     Essential hypertension  - monitor blood pressure at home  - watch diet - decreased salt. - on verapamil   - on terasozin   - on lisinopril   - on chlorthialidone 12.5mg   - poss white coat   - improved     Stenosis of right carotid artery  - Last  carotid 12/2019 - . Less than 50% stenosis of the right and left internal carotid artery.     Mixed hyperlipidemia  - watch diet  - on atorvastatin   - follow labs (9/2021) - stable     Current mild episode of major depressive disorder, unspecified whether recurrent ([address]  - on zoloft to 100mg  - no suicidal thoughts  - states family thinks is having issues, but patient thinks is ok (12/20)     Gastroesophageal reflux disease without esophagitis  - watch diet  - on omeprazole 40mg   - stable on meds     Generalized osteoarthritis  - following with ortho  - stable    [place]AR[place] report reviewed (9/27/2021)  Controlled substance agreement updated (3/29/2021)    Controlled Substance Monitoring:    Acute and Chronic Pain Monitoring:   RX Monitoring 9/27/2021   Attestation - Periodic Controlled Substance Monitoring Possible medication side effects, risk of tolerance/dependence & alternative treatments discussed. ;No signs of potential drug abuse or diversion identified.;Obtaining appropriate analgesic effect of treatment. 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  - s/p partial thyroidectomy  - reviewed path - Hurthle cell  - monitor labs   - increased to 75mcg (1/2020)  - labs lab (9/2021) - Stable - borderline to overactive    Postoperative hypothyroidism  - s/p partial thyroidectomy  - on synthroid  - monitor labs   - last lab (9/2021) - Stable - borderline to overactive     Impaired fasting glucose  - A1c (9/2021) - normal at 5.5  - watch diet    Dyspnea on exertion  - Uncertain etiology at present   - recheck EKG   - has seen cardio  - worsen ER   - refer to cardio for re-eval   - improved with median nerve blocks for the back (9/2021)    Vitamin D deficiency  - on otc supplement   - stable     Presence of right artificial knee joint    Long term current use of therapeutic drug  - Chronic PPI therapy   - follow bone and B12     Return in about 3 months (around 12/27/2021) for check up and review.     Orders Placed This Encounter   Procedures    INFLUENZA, QUADV, ADJUVANTED, 72 YRS =, IM, PF, PREFILL SYR, 0.5ML (FLUAD)    [place] REMOVAL IMPACTED CERUMEN IRRIGATION/LVG UNILAT     Requested Prescriptions     Signed Prescriptions Disp Refills    omeprazole (PRILOSEC) 40 MG delayed release capsule 90 capsule 1     Sig: Take 1 capsule by mouth daily Instructed to take with sip water am of procedure    lisinopril (PRINIVIL;ZESTRIL) 10 MG tablet 90 tablet 1     Sig: Take 1 tablet by mouth daily    terazosin (HYTRIN) 2 MG capsule 90 capsule 1     Sig: Take 1 capsule by mouth nightly    levothyroxine (SYNTHROID) 75 MCG tablet 90 tablet 1     Sig: Take 1 tablet by mouth Daily    sertraline (ZOLOFT) 100 MG tablet 90 tablet 1     Sig: Take 1 tablet by mouth daily    HYDROcodone-acetaminophen (NORCO) 5-325 MG per tablet 90 tablet 0     Sig: Take 1 tablet by mouth every 8 hours as needed for Pain for up to 30 days.     atorvastatin (LIPITOR) 20 MG tablet 90 tablet 1     Sig: Take 1 tablet by mouth daily    verapamil (CALAN SR) 240 MG extended release tablet 90 tablet 1     Sig: Take 1 tablet by mouth daily     Aleksandar Salgado MD  9/27/2021  3:59 PM

## 2022-01-05 ENCOUNTER — OFFICE VISIT (OUTPATIENT)
Dept: FAMILY MEDICINE CLINIC | Age: 87
End: 2022-01-05
Payer: MEDICARE

## 2022-01-05 VITALS
BODY MASS INDEX: 27.2 KG/M2 | WEIGHT: 153.5 LBS | HEIGHT: 63 IN | HEART RATE: 78 BPM | OXYGEN SATURATION: 93 % | TEMPERATURE: 97.3 F | DIASTOLIC BLOOD PRESSURE: 64 MMHG | SYSTOLIC BLOOD PRESSURE: 128 MMHG

## 2022-01-05 DIAGNOSIS — N18.31 STAGE 3A CHRONIC KIDNEY DISEASE (HCC): ICD-10-CM

## 2022-01-05 DIAGNOSIS — M54.50 CHRONIC BILATERAL LOW BACK PAIN WITHOUT SCIATICA: Primary | ICD-10-CM

## 2022-01-05 DIAGNOSIS — E89.0 POSTOPERATIVE HYPOTHYROIDISM: ICD-10-CM

## 2022-01-05 DIAGNOSIS — E78.2 MIXED HYPERLIPIDEMIA: ICD-10-CM

## 2022-01-05 DIAGNOSIS — M15.9 GENERALIZED OSTEOARTHRITIS: ICD-10-CM

## 2022-01-05 DIAGNOSIS — N39.3 STRESS INCONTINENCE OF URINE: ICD-10-CM

## 2022-01-05 DIAGNOSIS — I65.21 STENOSIS OF RIGHT CAROTID ARTERY: ICD-10-CM

## 2022-01-05 DIAGNOSIS — F32.0 CURRENT MILD EPISODE OF MAJOR DEPRESSIVE DISORDER, UNSPECIFIED WHETHER RECURRENT (HCC): ICD-10-CM

## 2022-01-05 DIAGNOSIS — K21.9 GASTROESOPHAGEAL REFLUX DISEASE WITHOUT ESOPHAGITIS: ICD-10-CM

## 2022-01-05 DIAGNOSIS — Z79.899 LONG TERM CURRENT USE OF THERAPEUTIC DRUG: ICD-10-CM

## 2022-01-05 DIAGNOSIS — I10 ESSENTIAL HYPERTENSION: ICD-10-CM

## 2022-01-05 DIAGNOSIS — D34 HURTHLE CELL TUMOR: ICD-10-CM

## 2022-01-05 DIAGNOSIS — R73.01 IMPAIRED FASTING GLUCOSE: ICD-10-CM

## 2022-01-05 DIAGNOSIS — G89.29 CHRONIC BILATERAL LOW BACK PAIN WITHOUT SCIATICA: Primary | ICD-10-CM

## 2022-01-05 PROCEDURE — 99213 OFFICE O/P EST LOW 20 MIN: CPT | Performed by: INTERNAL MEDICINE

## 2022-01-05 RX ORDER — HYDROCODONE BITARTRATE AND ACETAMINOPHEN 5; 325 MG/1; MG/1
1 TABLET ORAL EVERY 8 HOURS PRN
Qty: 90 TABLET | Refills: 0 | Status: SHIPPED | OUTPATIENT
Start: 2022-01-05 | End: 2022-04-05 | Stop reason: SDUPTHER

## 2022-01-05 RX ORDER — CHLORTHALIDONE 25 MG/1
12.5 TABLET ORAL DAILY
Qty: 45 TABLET | Refills: 1 | Status: SHIPPED
Start: 2022-01-05 | End: 2022-04-05 | Stop reason: SDUPTHER

## 2022-01-05 ASSESSMENT — ENCOUNTER SYMPTOMS
NAUSEA: 0
BLOOD IN STOOL: 0
DIARRHEA: 0
RHINORRHEA: 0
SORE THROAT: 0
VOMITING: 0
CONSTIPATION: 0
BACK PAIN: 1
COUGH: 0
CHEST TIGHTNESS: 0
SHORTNESS OF BREATH: 0
ABDOMINAL PAIN: 0
EYE PAIN: 0

## 2022-01-05 NOTE — PROGRESS NOTES
[facility]) Physicians   Internal Medicine     2022  Ziyad Her : 1933 Sex: female  Age:88 y.o. Chief Complaint   Patient presents with    3 Month Follow-Up    Hyperlipidemia    Depression    Chronic Pain        HPI:   Patient presents to office for evaluation for the following medical concerns. - States having some back discomfort. . States ache that is constant. Improved if sits or lies down. Worse with walking and activity. States located to lowe mis back. No radiation. No numbness, tingling. States occastional weakness in legs. Following with pain management. States has had () - median nerve branch block L4-5 and L5-S1 () - RFA of median branches of rt l4-5 and l5-s1. States taking hydrocodone daily, No reported side effects. Helping    - States has been fatigued more recently. States Some SOB. No chest pain/discomfort. No lightheaded or dizzy. States worse with activity.     - States labs showed renal insuffiencey. Was not sure if related to new medications or increased blood pressure or both. Has seen nephrology. Medications adjusted. (10/20) - added chlorthialidone 12.5mg, f/u 12 months. Last lab (2021) showed slight change in dysfunction.     - States has chronic rhinitis. Stable. States no cough. States nasal congestion. No sore throat. No chest pain, No fever or chills. No nausea or vomiting.     - States has high blood pressure. States does check blood pressure at home occasionally at home, range 120's. On verapamil,  terazosin, On lisinopril. Last visit  (10/20) - added chlorthialidone 12.5mg.      - States has high cholesterol, try's to watch diet, on cholesterol meds - atorvastatin. - States depression symptoms are stable. On Zoloft, helping. No side effects. States  season is a difficult time of year. 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 No side effects reported. Administration instructions reviewed. Last Lab (3/2021) - stable. - States has urinary incontinence - stable with meds (terazosin). States now having urinary frequency. - States has GERD, on omeprazole - Stable with omeprazole dose. - States has had right total knee arthoplasty for arthritis. Still having right Knee pain. States also has some back pain. States also right wrist pain. States following with ortho (Dr. Jennie Washburn). States had compression stockings. States also using heating pad to back. States needs refill of meds. Takes Norco mostly at night - helping. No reported side effects or complications reported. Health Maintenance   - immunizations:   Influenza Vaccination - (9/21/2016) , (10/2017), (10/2018), (2019), (9/15/20), (2021)  Pneumonia Vaccination - (12/2015) - prevnar, (12/2016) - pneumonoccal  Zoster/Shingles Vaccine  Tetanus Vaccination - (12/15/2015), (1/1/2020) - tdap   covid (1/20/2021) #1, (2/17/2021) #2, (10/2021) #3 - moderna    - Screenings:   Bone Density Scan - (2/28/2019)   Pelvic/Pap Exam  Mammogram - (8/14/2014), (4/2019) - neg     Colonoscopy - (2011) - neg per patient    Couseled on Home Safety - (11/28/2017)    Carotid Artery Study - (3/2016) - <50% b/l, also showed thyroid nodules,  (12/2019) - < 50% b/l     ROS:  Review of Systems   Constitutional: Positive for fatigue. Negative for appetite change, chills, fever and unexpected weight change. HENT: Negative for congestion, rhinorrhea and sore throat. Eyes: Negative for pain and visual disturbance. Respiratory: Negative for cough, chest tightness and shortness of breath. Cardiovascular: Negative for chest pain and palpitations. Gastrointestinal: Negative for abdominal pain, blood in stool, constipation, diarrhea, nausea and vomiting. Genitourinary: Negative for difficulty urinating, dysuria, frequency, pelvic pain, urgency and vaginal bleeding. Musculoskeletal: Positive for back pain.  Negative for arthralgias and myalgias. Skin: Negative for rash. Neurological: Negative for dizziness, syncope, weakness, light-headedness, numbness and headaches. Hematological: Negative for adenopathy. Psychiatric/Behavioral: Negative for suicidal ideas. The patient is not nervous/anxious. Current Outpatient Medications:     HYDROcodone-acetaminophen (NORCO) 5-325 MG per tablet, Take 1 tablet by mouth every 8 hours as needed for Pain for up to 30 days. , Disp: 90 tablet, Rfl: 0    chlorthalidone (HYGROTON) 25 MG tablet, Take 0.5 tablets by mouth daily, Disp: 45 tablet, Rfl: 1    omeprazole (PRILOSEC) 40 MG delayed release capsule, Take 1 capsule by mouth daily Instructed to take with sip water am of procedure, Disp: 90 capsule, Rfl: 1    lisinopril (PRINIVIL;ZESTRIL) 10 MG tablet, Take 1 tablet by mouth daily, Disp: 90 tablet, Rfl: 1    terazosin (HYTRIN) 2 MG capsule, Take 1 capsule by mouth nightly, Disp: 90 capsule, Rfl: 1    levothyroxine (SYNTHROID) 75 MCG tablet, Take 1 tablet by mouth Daily, Disp: 90 tablet, Rfl: 1    sertraline (ZOLOFT) 100 MG tablet, Take 1 tablet by mouth daily, Disp: 90 tablet, Rfl: 1    atorvastatin (LIPITOR) 20 MG tablet, Take 1 tablet by mouth daily, Disp: 90 tablet, Rfl: 1    verapamil (CALAN SR) 240 MG extended release tablet, Take 1 tablet by mouth daily, Disp: 90 tablet, Rfl: 1    aspirin 81 MG tablet, Take 81 mg by mouth daily, Disp: , Rfl:     Multiple Vitamins-Minerals (CENTRUM SILVER PO), Take 500 mg by mouth daily, Disp: , Rfl:     ascorbic acid (VITAMIN C) 500 MG tablet, Take 1,000 mg by mouth daily. , Disp: , Rfl:     Cholecalciferol (VITAMIN D3) 1000 units TABS, Take 1,000 Units by mouth daily , Disp: , Rfl:     amoxicillin (AMOXIL) 500 MG capsule, 4 tabs 1 hr prior to dental appt (Patient not taking: Reported on 1/5/2022)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    Depression     GERD (gastroesophageal reflux disease)     Hyperlipemia     Hypertension     Impaired fasting glucose 5/14/2019    Postoperative hypothyroidism 5/14/2019    Presence of right artificial knee joint 5/14/2019    Thyroid disease        Past Surgical History:   Procedure Laterality Date    CHOLECYSTECTOMY      EYE SURGERY      bilat cataract    HYSTERECTOMY      LUMBAR LAMINECTOMY  05/29/2013    L3-L4 with microdiscectomy    AL TOTAL KNEE ARTHROPLASTY Right 5/1/2018    RIGHT KNEE TOTAL ARTHROPLASTY (CHARLES)---PRP---PNB--- performed by Cristobal Rayo MD at [facility] OR       Family History   Problem Relation Age of Onset    Cancer Mother         stomach    Heart Attack Father     Coronary Art Dis Father        Social History     Socioeconomic History    Marital status:       Spouse name: Not on file    Number of children: Not on file    Years of education: Not on file    Highest education level: Not on file   Occupational History    Not on file   Tobacco Use    Smoking status: Never Smoker    Smokeless tobacco: Never Used   Substance and Sexual Activity    Alcohol use: No    Drug use: No    Sexual activity: Not on file   Other Topics Concern    Not on file   Social History Narrative    Not on file     Social Determinants of Health     Financial Resource Strain: Unknown    Difficulty of Paying Living Expenses: Patient refused   Food Insecurity: Unknown    Worried About Running Out of Food in the Last Year: Patient refused    Ran Out of Food in the Last Year: Patient refused   Transportation Needs: Unknown    Lack of Transportation (Medical): Patient refused    Lack of Transportation (Non-Medical): Patient refused   Physical Activity:     Days of Exercise per Week: Not on file   [employer] of Exercise per Session: Not on file   Stress:     Feeling of Stress : Not on file   Social Connections:     Frequency of Communication with Friends and Family: Not on file    Frequency of Social Gatherings with Friends and Family: Not on file    Attends [employer] Services: Not on file    Active Member of Clubs or Organizations: Not on file    Attends Club or Organization Meetings: Not on file    Marital Status: Not on file   Intimate Partner Violence:     Fear of Current or Ex-Partner: Not on file    Emotionally Abused: Not on file    Physically Abused: Not on file    Sexually Abused: Not on file   Housing Stability:     Unable to Pay for Housing in the Last Year: Not on file    Number of [place] in the Last Year: Not on file    Unstable Housing in the Last Year: Not on file       Vitals:    01/05/22 1331 01/05/22 1359   BP: (!) 150/72 128/64   Site: Right Upper Arm    Position: Sitting    Cuff Size: Medium Adult    Pulse: 78    Temp: 97.3 °F (36.3 °C)    SpO2: 93%    Weight: 153 lb 8 oz (69.6 kg)    Height: 5' 3\" (1.6 m)        Exam:  Physical Exam  Constitutional:       Appearance: She is well-developed. HENT:      Head: Normocephalic and atraumatic. Right Ear: External ear normal. There is impacted cerumen. Left Ear: External ear normal. There is impacted cerumen. Eyes:      Pupils: Pupils are equal, round, and reactive to light. Neck:      Thyroid: No thyromegaly. Vascular: Carotid bruit present. Comments: Right bruit   Cardiovascular:      Rate and Rhythm: Normal rate and regular rhythm. Heart sounds: Murmur heard. Pulmonary:      Effort: Pulmonary effort is normal.      Breath sounds: Normal breath sounds. No wheezing. Abdominal:      General: Bowel sounds are normal. There is no distension. Palpations: Abdomen is soft. Tenderness: There is no abdominal tenderness. There is no guarding or rebound. Musculoskeletal:      Cervical back: Normal range of motion and neck supple. Lumbar back: Decreased range of motion.    Lymphadenopathy:      Cervical: No cervical adenopathy. Skin:     General: Skin is warm and dry. Neurological:      Mental Status: She is alert and oriented to person, place, and time. Cranial Nerves: No cranial nerve deficit. Psychiatric:         Judgment: Judgment normal.         Assessment and Plan:    Diagnoses and all orders for this visit:    Bilateral impacted cerumen  - attempt lavage     Chronic bilateral low back pain without sciatica  - uncertain as to cause - most prob OA   - home exercises   - discussed physical therapy - declines   - following with pain management   - continue norco as prescribed - states has been able to decrease (9/2021)   - s/p median nerve branch block L4-5 and L5-S1, helped (9/2021)   - (12/21) - RFA of median branches of rt l4-5 and l5-s1  - improved     Stage 3a chronic kidney disease  - uncertain etiology   - May be multifactorial - new medications, long standing HTN   -  kidney ok   - no currently following with nephrology   - (10/20) - added chlorthialidone 12.5mg  - last lab (9/2021) showed slight change     Essential hypertension  - monitor blood pressure at home  - watch diet - decreased salt. - on verapamil   - on terasozin   - on lisinopril   - on chlorthialidone 12.5mg   - poss white coat   - improved     Stenosis of right carotid artery  - Last US carotid 12/2019 - . Less than 50% stenosis of the right and left internal carotid artery.     Mixed hyperlipidemia  - watch diet  - on atorvastatin   - follow labs (9/2021) - stable     Current mild episode of major depressive disorder, unspecified whether recurrent ([address]  - on zoloft to 100mg  - no suicidal thoughts  - states family thinks is having issues, but patient thinks is ok (12/20)     Gastroesophageal reflux disease without esophagitis  - watch diet  - on omeprazole 40mg   - stable on meds     Generalized osteoarthritis  - following with ortho  - stable    [place]AR[place] report reviewed (1/5/2022)  Controlled substance agreement updated (3/29/2021)    Controlled Substance Monitoring:    Acute and Chronic Pain Monitoring:   RX Monitoring 1/5/2022   Attestation -   Periodic Controlled Substance Monitoring Possible medication side effects, risk of tolerance/dependence & alternative treatments discussed. ;No signs of potential drug abuse or diversion identified.;Obtaining appropriate analgesic effect of treatment. 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  - s/p partial thyroidectomy  - reviewed path - Hurthle cell  - monitor labs   - increased to 75mcg (1/2020)  - labs lab (9/2021) - Stable - borderline to overactive    Postoperative hypothyroidism  - s/p partial thyroidectomy  - on synthroid  - monitor labs   - last lab (9/2021) - Stable - borderline to overactive     Impaired fasting glucose  - A1c (9/2021) - normal at 5.5  - watch diet    Dyspnea on exertion  - Uncertain etiology at present   - recheck EKG   - has seen cardio  - improved with median nerve blocks for the back (9/2021)    Vitamin D deficiency  - on otc supplement   - stable     Presence of right artificial knee joint    Long term current use of therapeutic drug  - Chronic PPI therapy   - follow bone and B12     Return in about 3 months (around 4/5/2022) for check up and review.     Orders Placed This Encounter   Procedures    Comprehensive Metabolic Panel     Standing Status:   Future     Standing Expiration Date:   1/5/2023    Magnesium     Standing Status:   Future     Standing Expiration Date:   1/5/2023    Lipid, Fasting     Standing Status:   Future     Standing Expiration Date:   1/5/2023    Hemoglobin A1C     Standing Status:   Future     Standing Expiration Date:   1/5/2023    Urinalysis     Standing Status:   Future     Standing Expiration Date:   1/5/2023    TSH without Reflex     Standing Status:   Future     Standing Expiration Date:   4/5/2022    Microalbumin, Ur     Standing Status: Future     Standing Expiration Date:   1/5/2023    CBC Auto Differential     Standing Status:   Future     Standing Expiration Date:   1/5/2023    Vitamin B12 & Folate     Standing Status:   Future     Standing Expiration Date:   1/5/2023     Requested Prescriptions     Signed Prescriptions Disp Refills    HYDROcodone-acetaminophen (NORCO) 5-325 MG per tablet 90 tablet 0     Sig: Take 1 tablet by mouth every 8 hours as needed for Pain for up to 30 days.     chlorthalidone (HYGROTON) 25 MG tablet 45 tablet 1     Sig: Take 0.5 tablets by mouth daily     Isauro Vega MD  1/5/2022  2:19 PM

## 2022-03-22 ENCOUNTER — OFFICE VISIT (OUTPATIENT)
Dept: PODIATRY | Age: 87
End: 2022-03-22
Payer: MEDICARE

## 2022-03-22 VITALS — BODY MASS INDEX: 27.11 KG/M2 | WEIGHT: 153 LBS | HEIGHT: 63 IN

## 2022-03-22 DIAGNOSIS — M20.42 HAMMER TOES OF BOTH FEET: Primary | ICD-10-CM

## 2022-03-22 DIAGNOSIS — M21.612 BUNION, LEFT: ICD-10-CM

## 2022-03-22 DIAGNOSIS — L84 CORNS AND CALLOSITIES: ICD-10-CM

## 2022-03-22 DIAGNOSIS — M20.41 HAMMER TOES OF BOTH FEET: Primary | ICD-10-CM

## 2022-03-22 PROCEDURE — 99213 OFFICE O/P EST LOW 20 MIN: CPT | Performed by: PODIATRIST

## 2022-03-22 RX ORDER — AMMONIUM LACTATE 12 G/100G
LOTION TOPICAL
Qty: 400 G | Refills: 2 | Status: SHIPPED | OUTPATIENT
Start: 2022-03-22

## 2022-04-05 ENCOUNTER — OFFICE VISIT (OUTPATIENT)
Dept: FAMILY MEDICINE CLINIC | Age: 87
End: 2022-04-05
Payer: MEDICARE

## 2022-04-05 VITALS
HEIGHT: 63 IN | OXYGEN SATURATION: 96 % | BODY MASS INDEX: 28.35 KG/M2 | TEMPERATURE: 97.8 F | RESPIRATION RATE: 16 BRPM | DIASTOLIC BLOOD PRESSURE: 70 MMHG | HEART RATE: 77 BPM | SYSTOLIC BLOOD PRESSURE: 130 MMHG | WEIGHT: 160 LBS

## 2022-04-05 DIAGNOSIS — M15.9 GENERALIZED OSTEOARTHRITIS: ICD-10-CM

## 2022-04-05 DIAGNOSIS — N18.31 STAGE 3A CHRONIC KIDNEY DISEASE (HCC): ICD-10-CM

## 2022-04-05 DIAGNOSIS — I65.21 STENOSIS OF RIGHT CAROTID ARTERY: ICD-10-CM

## 2022-04-05 DIAGNOSIS — G89.29 CHRONIC BILATERAL LOW BACK PAIN WITHOUT SCIATICA: Primary | ICD-10-CM

## 2022-04-05 DIAGNOSIS — E89.0 POSTOPERATIVE HYPOTHYROIDISM: ICD-10-CM

## 2022-04-05 DIAGNOSIS — D34 HURTHLE CELL TUMOR: ICD-10-CM

## 2022-04-05 DIAGNOSIS — E78.2 MIXED HYPERLIPIDEMIA: Chronic | ICD-10-CM

## 2022-04-05 DIAGNOSIS — K21.9 GASTROESOPHAGEAL REFLUX DISEASE WITHOUT ESOPHAGITIS: Chronic | ICD-10-CM

## 2022-04-05 DIAGNOSIS — R73.01 IMPAIRED FASTING GLUCOSE: ICD-10-CM

## 2022-04-05 DIAGNOSIS — F32.0 CURRENT MILD EPISODE OF MAJOR DEPRESSIVE DISORDER, UNSPECIFIED WHETHER RECURRENT (HCC): Chronic | ICD-10-CM

## 2022-04-05 DIAGNOSIS — I10 ESSENTIAL HYPERTENSION: ICD-10-CM

## 2022-04-05 DIAGNOSIS — M54.50 CHRONIC BILATERAL LOW BACK PAIN WITHOUT SCIATICA: Primary | ICD-10-CM

## 2022-04-05 DIAGNOSIS — N39.3 STRESS INCONTINENCE OF URINE: ICD-10-CM

## 2022-04-05 PROCEDURE — 99213 OFFICE O/P EST LOW 20 MIN: CPT | Performed by: INTERNAL MEDICINE

## 2022-04-05 RX ORDER — LEVOTHYROXINE SODIUM 0.07 MG/1
75 TABLET ORAL DAILY
Qty: 90 TABLET | Refills: 1 | Status: SHIPPED
Start: 2022-04-05 | End: 2022-09-06 | Stop reason: SDUPTHER

## 2022-04-05 RX ORDER — HYDROCODONE BITARTRATE AND ACETAMINOPHEN 5; 325 MG/1; MG/1
1 TABLET ORAL EVERY 8 HOURS PRN
Qty: 90 TABLET | Refills: 0 | Status: SHIPPED | OUTPATIENT
Start: 2022-04-05 | End: 2022-07-05 | Stop reason: SDUPTHER

## 2022-04-05 RX ORDER — ATORVASTATIN CALCIUM 20 MG/1
20 TABLET, FILM COATED ORAL DAILY
Qty: 90 TABLET | Refills: 1 | Status: SHIPPED
Start: 2022-04-05 | End: 2022-09-06 | Stop reason: SDUPTHER

## 2022-04-05 RX ORDER — SERTRALINE HYDROCHLORIDE 100 MG/1
100 TABLET, FILM COATED ORAL DAILY
Qty: 90 TABLET | Refills: 1 | Status: SHIPPED
Start: 2022-04-05 | End: 2022-09-06 | Stop reason: SDUPTHER

## 2022-04-05 RX ORDER — LISINOPRIL 10 MG/1
10 TABLET ORAL DAILY
Qty: 90 TABLET | Refills: 1 | Status: SHIPPED
Start: 2022-04-05 | End: 2022-09-06 | Stop reason: SDUPTHER

## 2022-04-05 RX ORDER — TERAZOSIN 2 MG/1
2 CAPSULE ORAL NIGHTLY
Qty: 90 CAPSULE | Refills: 1 | Status: SHIPPED
Start: 2022-04-05 | End: 2022-09-06 | Stop reason: SDUPTHER

## 2022-04-05 RX ORDER — OMEPRAZOLE 40 MG/1
40 CAPSULE, DELAYED RELEASE ORAL DAILY
Qty: 90 CAPSULE | Refills: 1 | Status: SHIPPED
Start: 2022-04-05 | End: 2022-09-06 | Stop reason: SDUPTHER

## 2022-04-05 RX ORDER — CHLORTHALIDONE 25 MG/1
12.5 TABLET ORAL DAILY
Qty: 45 TABLET | Refills: 1 | Status: SHIPPED
Start: 2022-04-05 | End: 2022-07-27 | Stop reason: ALTCHOICE

## 2022-04-05 RX ORDER — VERAPAMIL HYDROCHLORIDE 240 MG/1
240 TABLET, FILM COATED, EXTENDED RELEASE ORAL DAILY
Qty: 90 TABLET | Refills: 1 | Status: SHIPPED
Start: 2022-04-05 | End: 2022-09-06 | Stop reason: SDUPTHER

## 2022-04-05 SDOH — ECONOMIC STABILITY: FOOD INSECURITY: WITHIN THE PAST 12 MONTHS, THE FOOD YOU BOUGHT JUST DIDN'T LAST AND YOU DIDN'T HAVE MONEY TO GET MORE.: PATIENT DECLINED

## 2022-04-05 SDOH — ECONOMIC STABILITY: FOOD INSECURITY: WITHIN THE PAST 12 MONTHS, YOU WORRIED THAT YOUR FOOD WOULD RUN OUT BEFORE YOU GOT MONEY TO BUY MORE.: PATIENT DECLINED

## 2022-04-05 ASSESSMENT — ENCOUNTER SYMPTOMS
DIARRHEA: 0
CHEST TIGHTNESS: 0
NAUSEA: 0
COUGH: 0
SHORTNESS OF BREATH: 0
ABDOMINAL PAIN: 0
SORE THROAT: 0
VOMITING: 0
CONSTIPATION: 0
RHINORRHEA: 0
BACK PAIN: 1
EYE PAIN: 0
BLOOD IN STOOL: 0

## 2022-04-05 ASSESSMENT — SOCIAL DETERMINANTS OF HEALTH (SDOH): HOW HARD IS IT FOR YOU TO PAY FOR THE VERY BASICS LIKE FOOD, HOUSING, MEDICAL CARE, AND HEATING?: PATIENT DECLINED

## 2022-04-05 NOTE — PROGRESS NOTES
[facility]) Physicians   Internal Medicine     2022  Brandt Fernando : 1933 Sex: female  Age:88 y.o. Chief Complaint   Patient presents with    Chronic Pain    Hypertension    Hyperlipidemia    Back Pain        HPI:   Patient presents to office for evaluation for the following medical concerns. - states having left foot pain. States Has seen podiatry. Has bunion and hammer toes. Recommend pad. Still with discomfort.     - States having some back discomfort. . States ache that is constant. Improved if sits or lies down. Worse with walking and activity. States located to lowe mis back. No radiation. No numbness, tingling. States occastional weakness in legs. Following with pain management. States has had () - median nerve branch block L4-5 and L5-S1 () - RFA of median branches of rt l4-5 and l5-s1. States taking hydrocodone daily, No reported side effects. Helping.     - States has been fatigued more recently. States Some SOB. No chest pain/discomfort. No lightheaded or dizzy. States worse with activity.     - States labs showed renal insuffiencey. Was not sure if related to new medications or increased blood pressure or both. Has seen nephrology. Medications adjusted. (10/20) - added chlorthialidone 12.5mg, f/u 12 months. Last lab (2021) showed slight change in dysfunction.     - States has chronic rhinitis. Stable. States no cough. States nasal congestion. No sore throat. No chest pain, No fever or chills. No nausea or vomiting.     - States has high blood pressure. States does check blood pressure at home occasionally at home, range 120's. On verapamil,  terazosin, On lisinopril and chlorthialidone 12.5mg. No reported side effects. No lightheaded or dizzy. No headaches. No new vision changes. No loc/syncope. No chest pain or SOB. - States has high cholesterol, try's to watch diet, on atorvastatin. No reported side effects     - States depression symptoms are stable.  On Zoloft, helping. No side effects. States Benjamin season is a difficult time of year. 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 No side effects reported. Administration instructions reviewed. Last Lab (3/2021) - stable. - States has urinary incontinence - stable with meds (terazosin). States now having urinary frequency. - States has GERD, on omeprazole - Stable with omeprazole dose. - States has had right total knee arthoplasty for arthritis. Still having right Knee pain. States also has some back pain. States also right wrist pain. States following with ortho (Dr. Eldon De La Torre). States had compression stockings. States also using heating pad to back. States needs refill of meds. Takes Norco mostly at night - helping. No reported side effects or complications reported. Health Maintenance   - immunizations:   Influenza Vaccination - (9/21/2016) , (10/2017), (10/2018), (2019), (9/15/20), (2021)  Pneumonia Vaccination - (12/2015) - prevnar, (12/2016) - pneumonoccal  Zoster/Shingles Vaccine  Tetanus Vaccination - (12/15/2015), (1/1/2020) - tdap   covid (1/20/2021) #1, (2/17/2021) #2, (10/2021) #3 - moderna    - Screenings:   Bone Density Scan - (2/28/2019)   Pelvic/Pap Exam  Mammogram - (8/14/2014), (4/2019) - neg     Colonoscopy - (2011) - neg per patient    Couseled on Home Safety - (11/28/2017)    Carotid Artery Study - (3/2016) - <50% b/l, also showed thyroid nodules,  (12/2019) - < 50% b/l     ROS:  Review of Systems   Constitutional: Negative for appetite change, chills, fever and unexpected weight change. HENT: Negative for congestion, rhinorrhea and sore throat. Eyes: Negative for pain and visual disturbance. Respiratory: Negative for cough, chest tightness and shortness of breath. Cardiovascular: Negative for chest pain and palpitations.    Gastrointestinal: Negative for abdominal pain, blood in stool, constipation, diarrhea, nausea and vomiting. Genitourinary: Negative for difficulty urinating, dysuria, frequency, pelvic pain, urgency and vaginal bleeding. Musculoskeletal: Positive for arthralgias and back pain. Negative for myalgias. Skin: Negative for rash. Neurological: Negative for dizziness, syncope, weakness, light-headedness, numbness and headaches. Hematological: Negative for adenopathy. Psychiatric/Behavioral: Negative for suicidal ideas. The patient is not nervous/anxious. Current Outpatient Medications:     atorvastatin (LIPITOR) 20 MG tablet, Take 1 tablet by mouth daily, Disp: 90 tablet, Rfl: 1    chlorthalidone (HYGROTON) 25 MG tablet, Take 0.5 tablets by mouth daily, Disp: 45 tablet, Rfl: 1    HYDROcodone-acetaminophen (NORCO) 5-325 MG per tablet, Take 1 tablet by mouth every 8 hours as needed for Pain for up to 30 days. , Disp: 90 tablet, Rfl: 0    levothyroxine (SYNTHROID) 75 MCG tablet, Take 1 tablet by mouth Daily, Disp: 90 tablet, Rfl: 1    lisinopril (PRINIVIL;ZESTRIL) 10 MG tablet, Take 1 tablet by mouth daily, Disp: 90 tablet, Rfl: 1    omeprazole (PRILOSEC) 40 MG delayed release capsule, Take 1 capsule by mouth daily Instructed to take with sip water am of procedure, Disp: 90 capsule, Rfl: 1    sertraline (ZOLOFT) 100 MG tablet, Take 1 tablet by mouth daily, Disp: 90 tablet, Rfl: 1    terazosin (HYTRIN) 2 MG capsule, Take 1 capsule by mouth nightly, Disp: 90 capsule, Rfl: 1    verapamil (CALAN SR) 240 MG extended release tablet, Take 1 tablet by mouth daily, Disp: 90 tablet, Rfl: 1    ammonium lactate (LAC-HYDRIN) 12 % lotion, Apply topically twice daily, Disp: 400 g, Rfl: 2    amoxicillin (AMOXIL) 500 MG capsule, 4 tabs 1 hr prior to dental appt, Disp: , Rfl:     aspirin 81 MG tablet, Take 81 mg by mouth daily, Disp: , Rfl:     Multiple Vitamins-Minerals (CENTRUM SILVER PO), Take 500 mg by mouth daily, Disp: , Rfl:     ascorbic acid (VITAMIN C) 500 MG tablet, Take 1,000 mg by mouth daily. 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    Presence of right artificial knee joint 5/14/2019    Thyroid disease        Past Surgical History:   Procedure Laterality Date    CHOLECYSTECTOMY      EYE SURGERY      bilat cataract    HYSTERECTOMY      LUMBAR LAMINECTOMY  05/29/2013    L3-L4 with microdiscectomy    CA TOTAL KNEE ARTHROPLASTY Right 5/1/2018    RIGHT KNEE TOTAL ARTHROPLASTY (CHARLES)---PRP---PNB--- performed by Shannon Sharpe MD at [facility] OR       Family History   Problem Relation Age of Onset    Cancer Mother         stomach    Heart Attack Father     Coronary Art Dis Father        Social History     Socioeconomic History    Marital status:      Spouse name: Not on file    Number of children: Not on file    Years of education: Not on file    Highest education level: Not on file   Occupational History    Not on file   Tobacco Use    Smoking status: Never Smoker    Smokeless tobacco: Never Used   Substance and Sexual Activity    Alcohol use: No    Drug use: No    Sexual activity: Not on file   Other Topics Concern    Not on file   Social History Narrative    Not on file     Social Determinants of Health     Financial Resource Strain: Unknown    Difficulty of Paying Living Expenses: Patient refused   Food Insecurity: Unknown    Worried About [address] in the Last Year: Patient refused    [address] in the Last Year: Patient refused   Transportation Needs:     Lack of Transportation (Medical):  Not on file    Lack of Transportation (Non-Medical): Not on file   Physical Activity:     Days of Exercise per Week: Not on file    Minutes of Exercise per Session: Not on file   Stress:     Feeling of Stress : Not on file   Social Connections:     Frequency of Communication with Friends and Family: Not on file    Frequency of Social Gatherings with Friends and Family: Not on file    Attends Druze Services: Not on file    Active Member of [address] [place] [address] or Organizations: Not on file    Attends Club or Organization Meetings: Not on file    Marital Status: Not on file   Intimate Partner Violence:     Fear of Current or Ex-Partner: Not on file    Emotionally Abused: Not on file    Physically Abused: Not on file    Sexually Abused: Not on file   Housing Stability:     Unable to Pay for Housing in the Last Year: Not on file    Number of [place] in the Last Year: Not on file    Unstable Housing in the Last Year: Not on file       Vitals:    04/05/22 1400 04/05/22 1437   BP: (!) 180/80 130/70   Pulse: 77    Resp: 16    Temp: 97.8 °F (36.6 °C)    TempSrc: Temporal    SpO2: 96%    Weight: 160 lb (72.6 kg)    Height: 5' 3\" (1.6 m)        Exam:  Physical Exam  Vitals reviewed. Constitutional:       Appearance: She is well-developed. HENT:      Head: Normocephalic and atraumatic. Right Ear: External ear normal.      Left Ear: External ear normal.   Eyes:      Pupils: Pupils are equal, round, and reactive to light. Neck:      Thyroid: No thyromegaly. Vascular: Carotid bruit present. Cardiovascular:      Rate and Rhythm: Normal rate and regular rhythm. Heart sounds: Murmur heard. Pulmonary:      Effort: Pulmonary effort is normal.      Breath sounds: Normal breath sounds. No wheezing. Abdominal:      General: Bowel sounds are normal. There is no distension. Palpations: Abdomen is soft. Tenderness: There is no abdominal tenderness. There is no guarding or rebound.    Musculoskeletal: Cervical back: Normal range of motion and neck supple. Lumbar back: Tenderness present. Decreased range of motion. Lymphadenopathy:      Cervical: No cervical adenopathy. Skin:     General: Skin is warm and dry. Neurological:      Mental Status: She is alert and oriented to person, place, and time. Cranial Nerves: No cranial nerve deficit. Psychiatric:         Judgment: Judgment normal.         Assessment and Plan:    Diagnoses and all orders for this visit:    Chronic bilateral low back pain without sciatica  - uncertain as to cause - most prob OA   - home exercises   - discussed physical therapy - declines   - following with pain management   - continue norco as prescribed - states has been able to decrease (9/2021)   - s/p median nerve branch block L4-5 and L5-S1, helped (9/2021)   - (12/21) - RFA of median branches of rt l4-5 and l5-s1  - stable with meds     Stage 3a chronic kidney disease  - uncertain etiology   - May be multifactorial - new medications, long standing HTN   -  kidney ok   - not currently following with nephrology   - (10/20) - added chlorthialidone 12.5mg  - last lab (9/2021) showed slight change     Essential hypertension  - monitor blood pressure at home  - watch diet - decreased salt. - on verapamil   - on terasozin   - on lisinopril   - on chlorthialidone 12.5mg   - poss white coat   - elevated today 4/5/2022     Stenosis of right carotid artery  - Last US carotid 12/2019 - . Less than 50% stenosis of the right and left internal carotid artery.     Mixed hyperlipidemia  - watch diet  - on atorvastatin   - follow labs (9/2021) - stable     Current mild episode of major depressive disorder, unspecified whether recurrent ([facility] [address]  - on zoloft to 100mg  - no suicidal thoughts  - states family thinks is having issues, but patient thinks is ok (12/20)     Gastroesophageal reflux disease without esophagitis  - watch diet  - on omeprazole 40mg   - stable on meds     Generalized osteoarthritis  - following with ortho  - stable    [place]AR[place] report reviewed (4/5/2022)  Controlled substance agreement updated (3/29/2021)    Controlled Substance Monitoring:    Acute and Chronic Pain Monitoring:   RX Monitoring 4/5/2022   Attestation -   Periodic Controlled Substance Monitoring Possible medication side effects, risk of tolerance/dependence & alternative treatments discussed. ;No signs of potential drug abuse or diversion identified.;Obtaining appropriate analgesic effect of treatment. 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  - s/p partial thyroidectomy  - reviewed path - Hurthle cell  - monitor labs   - increased to 75mcg (1/2020)  - labs lab (9/2021) - Stable - borderline to overactive    Postoperative hypothyroidism  - s/p partial thyroidectomy  - on synthroid  - monitor labs   - last lab (9/2021) - Stable - borderline to overactive     Impaired fasting glucose  - A1c (9/2021) - normal at 5.5  - watch diet    Dyspnea on exertion  - Uncertain etiology at present   - recheck EKG   - has seen cardio  - improved with median nerve blocks for the back (9/2021)    Vitamin D deficiency  - on otc supplement   - stable     Presence of right artificial knee joint    Long term current use of therapeutic drug  - Chronic PPI therapy   - follow bone and B12     Return in about 3 months (around 7/5/2022) for check up and review and labs. No orders of the defined types were placed in this encounter. Requested Prescriptions     Signed Prescriptions Disp Refills    atorvastatin (LIPITOR) 20 MG tablet 90 tablet 1     Sig: Take 1 tablet by mouth daily    chlorthalidone (HYGROTON) 25 MG tablet 45 tablet 1     Sig: Take 0.5 tablets by mouth daily    HYDROcodone-acetaminophen (NORCO) 5-325 MG per tablet 90 tablet 0     Sig: Take 1 tablet by mouth every 8 hours as needed for Pain for up to 30 days.     levothyroxine (SYNTHROID) 75 MCG tablet 90 tablet 1     Sig: Take 1 tablet by mouth Daily    lisinopril (PRINIVIL;ZESTRIL) 10 MG tablet 90 tablet 1     Sig: Take 1 tablet by mouth daily    omeprazole (PRILOSEC) 40 MG delayed release capsule 90 capsule 1     Sig: Take 1 capsule by mouth daily Instructed to take with sip water am of procedure    sertraline (ZOLOFT) 100 MG tablet 90 tablet 1     Sig: Take 1 tablet by mouth daily    terazosin (HYTRIN) 2 MG capsule 90 capsule 1     Sig: Take 1 capsule by mouth nightly    verapamil (CALAN SR) 240 MG extended release tablet 90 tablet 1     Sig: Take 1 tablet by mouth daily     Leann Goncalves MD  4/5/2022  2:41 PM

## 2022-04-26 ENCOUNTER — OFFICE VISIT (OUTPATIENT)
Dept: PODIATRY | Age: 87
End: 2022-04-26
Payer: MEDICARE

## 2022-04-26 VITALS — HEIGHT: 63 IN | BODY MASS INDEX: 28.35 KG/M2 | WEIGHT: 160 LBS

## 2022-04-26 DIAGNOSIS — M21.612 BUNION, LEFT: ICD-10-CM

## 2022-04-26 DIAGNOSIS — M20.42 HAMMER TOES OF BOTH FEET: Primary | ICD-10-CM

## 2022-04-26 DIAGNOSIS — L84 CORNS AND CALLOSITIES: ICD-10-CM

## 2022-04-26 DIAGNOSIS — M20.41 HAMMER TOES OF BOTH FEET: Primary | ICD-10-CM

## 2022-04-26 PROCEDURE — 99213 OFFICE O/P EST LOW 20 MIN: CPT | Performed by: PODIATRIST

## 2022-04-26 NOTE — PROGRESS NOTES
Patient in office to follow up with left foot pain. Continues to have pain to hammertoe left foot. Jordyn Ta MD last seen 04/05/2022. CC:    Follow-up bunion and hammertoe left second toe    HPI:   Presents today with her family member for bunion and hammertoe left foot. No significant tenderness as long she is wearing offloading padding. Has been using ammonium lactate 12% has noticed improvement. Denies any open skin lesions or abrasions. No significant callus tissue today. No additional pedal complaints. ROS:  Const: Denies constitutional symptoms  Musculo: Denies symptoms other than stated above  Skin: Denies symptoms other than stated above       Current Outpatient Medications:     methylPREDNISolone (MEDROL DOSEPACK) 4 MG tablet, Take by mouth., Disp: 1 kit, Rfl: 0    atorvastatin (LIPITOR) 20 MG tablet, Take 1 tablet by mouth daily, Disp: 90 tablet, Rfl: 1    chlorthalidone (HYGROTON) 25 MG tablet, Take 0.5 tablets by mouth daily, Disp: 45 tablet, Rfl: 1    HYDROcodone-acetaminophen (NORCO) 5-325 MG per tablet, Take 1 tablet by mouth every 8 hours as needed for Pain for up to 30 days. , Disp: 90 tablet, Rfl: 0    levothyroxine (SYNTHROID) 75 MCG tablet, Take 1 tablet by mouth Daily, Disp: 90 tablet, Rfl: 1    lisinopril (PRINIVIL;ZESTRIL) 10 MG tablet, Take 1 tablet by mouth daily, Disp: 90 tablet, Rfl: 1    omeprazole (PRILOSEC) 40 MG delayed release capsule, Take 1 capsule by mouth daily Instructed to take with sip water am of procedure, Disp: 90 capsule, Rfl: 1    sertraline (ZOLOFT) 100 MG tablet, Take 1 tablet by mouth daily, Disp: 90 tablet, Rfl: 1    terazosin (HYTRIN) 2 MG capsule, Take 1 capsule by mouth nightly, Disp: 90 capsule, Rfl: 1    verapamil (CALAN SR) 240 MG extended release tablet, Take 1 tablet by mouth daily, Disp: 90 tablet, Rfl: 1    ammonium lactate (LAC-HYDRIN) 12 % lotion, Apply topically twice daily, Disp: 400 g, Rfl: 2    amoxicillin (AMOXIL) 500 MG capsule, 4 tabs 1 hr prior to dental appt, Disp: , Rfl:     aspirin 81 MG tablet, Take 81 mg by mouth daily, Disp: , Rfl:     Multiple Vitamins-Minerals (CENTRUM SILVER PO), Take 500 mg by mouth daily, Disp: , Rfl:     ascorbic acid (VITAMIN C) 500 MG tablet, Take 1,000 mg by mouth daily. 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    Thyroid disease            Vitals:    04/26/22 1601   Weight: 160 lb (72.6 kg)   Height: 5' 3\" (1.6 m)       Work History/Social History: Foot and ankle history:     Focused Lower Extremity Physical Exam:    Neurovascular examination:    Dorsalis Pedis palpable bilateral.  Posterior tibialis palpable bilateral.    Capillary Refill Time:  Immediate return  Hair growth:  Symmetrical and bilateral   Skin:  Not atrophic  Edema: No edema bilateral feet or ankles. Neurologic:  Light touch intact bilateral.      Musculoskeletal/ Orthopedic examination:    Equinis: Absent bilateral  Dorsiflexion, plantarflexion, inversion, eversion bilateral 5 out of 5 muscle strength  Wiggling toes  Negative Homans  Bunion deformity left great toe and hammertoe digits 2 through 5 left foot. No pain great toe or second toe left foot. Dermatology examination:    No significant hyperkeratotic tissue foot or ankle noted today. Assessment and Plan:  Donny Grullon was seen today for follow-up and foot pain.     Diagnoses and all orders for this visit:    Hammer toes of both feet    Corns and callosities    Bunion, left      Follow-up bunion and hammertoe pain  Does present with family member. I would recommend offloading padding between left great toe and left second toe. Has progressed well. No significant callus tissue noted today. Continue ammonium lactate 12% twice daily. We did discuss continued conservative treatment options at this time. She will call for follow-up. No follow-ups on file. Seen By:  Arnie Workman DPM      Document was created using voice recognition software. Note was reviewed, however may contain grammatical errors.

## 2022-04-27 ENCOUNTER — OFFICE VISIT (OUTPATIENT)
Dept: FAMILY MEDICINE CLINIC | Age: 87
End: 2022-04-27
Payer: MEDICARE

## 2022-04-27 ENCOUNTER — TELEPHONE (OUTPATIENT)
Dept: FAMILY MEDICINE CLINIC | Age: 87
End: 2022-04-27

## 2022-04-27 VITALS
SYSTOLIC BLOOD PRESSURE: 150 MMHG | HEART RATE: 75 BPM | HEIGHT: 63 IN | BODY MASS INDEX: 28 KG/M2 | DIASTOLIC BLOOD PRESSURE: 60 MMHG | WEIGHT: 158 LBS | OXYGEN SATURATION: 97 % | TEMPERATURE: 97.7 F | RESPIRATION RATE: 16 BRPM

## 2022-04-27 DIAGNOSIS — M25.512 CHRONIC LEFT SHOULDER PAIN: Primary | ICD-10-CM

## 2022-04-27 DIAGNOSIS — G89.29 CHRONIC LEFT SHOULDER PAIN: Primary | ICD-10-CM

## 2022-04-27 PROCEDURE — 99213 OFFICE O/P EST LOW 20 MIN: CPT | Performed by: INTERNAL MEDICINE

## 2022-04-27 RX ORDER — METHYLPREDNISOLONE 4 MG/1
TABLET ORAL
Qty: 1 KIT | Refills: 0 | Status: SHIPPED | OUTPATIENT
Start: 2022-04-27 | End: 2022-05-03

## 2022-04-27 NOTE — TELEPHONE ENCOUNTER
Please let the patient know that x-ray of the shoulder per radiology report suggested    Mild osteoarthritis of the acromioclavicular joints where he had some tenderness    No fractures or dislocations    Recommending the home exercises as given during office visit    Recommend orthopedic referral for further assessment thanks

## 2022-04-27 NOTE — PATIENT INSTRUCTIONS
Patient Education        Shoulder Arthritis: Exercises  Introduction  Here are some examples of exercises for you to try. The exercises may be suggested for a condition or for rehabilitation. Start each exercise slowly. Ease off the exercises if you start to have pain. You will be told when to start these exercises and which ones will work bestfor you. How to do the exercises  Shoulder flexion (lying down)    To make a wand for this exercise, use a piece of PVC pipe or a broom handlewith the broom removed. Make the wand about a foot wider than your shoulders. 1. Lie on your back, holding a wand with both hands. Your palms should face down as you hold the wand. 2. Keeping your elbows straight, slowly raise your arms over your head. Raise them until you feel a stretch in your shoulders, upper back, and chest.  3. Hold for 15 to 30 seconds. 4. Repeat 2 to 4 times. Shoulder rotation (lying down)    To make a wand for this exercise, use a piece of PVC pipe or a broom handlewith the broom removed. Make the wand about a foot wider than your shoulders. 1. Lie on your back. Hold a wand with both hands with your elbows bent and palms up. 2. Keep your elbows close to your body, and move the wand across your body toward the sore arm. 3. Hold for 8 to 12 seconds. 4. Repeat 2 to 4 times. Shoulder internal rotation with towel    1. Hold a towel above and behind your head with the arm that is not sore. 2. With your sore arm, reach behind your back and grasp the towel. 3. With the arm above your head, pull the towel upward. Do this until you feel a stretch on the front and outside of your sore shoulder. 4. Hold 15 to 30 seconds. 5. Repeat 2 to 4 times. Shoulder blade squeeze    1. Stand with your arms at your sides, and squeeze your shoulder blades together. Do not raise your shoulders up as you squeeze. 2. Hold 6 seconds. 3. Repeat 8 to 12 times.   Resisted rows    For this exercise, you will need elastic exercise material, such as surgicaltubing or Thera-Band. 1. Put the band around a solid object at about waist level. (A bedpost will work well.) Each hand should hold an end of the band. 2. With your elbows at your sides and bent to 90 degrees, pull the band back. Your shoulder blades should move toward each other. Return to the starting position. 3. Repeat 8 to 12 times. External rotator strengthening exercise    1. Start by tying a piece of elastic exercise material to a doorknob. You can use surgical tubing or Thera-Band. (You may also hold one end of the band in each hand.)  2. Stand or sit with your shoulder relaxed and your elbow bent 90 degrees. Your upper arm should rest comfortably against your side. Squeeze a rolled towel between your elbow and your body for comfort. This will help keep your arm at your side. 3. Hold one end of the elastic band with the hand of the painful arm. 4. Start with your forearm across your belly. Slowly rotate the forearm out away from your body. Keep your elbow and upper arm tucked against the towel roll or the side of your body until you begin to feel tightness in your shoulder. Slowly move your arm back to where you started. 5. Repeat 8 to 12 times. Internal rotator strengthening exercise    1. Start by tying a piece of elastic exercise material to a doorknob. You can use surgical tubing or Thera-Band. 2. Stand or sit with your shoulder relaxed and your elbow bent 90 degrees. Your upper arm should rest comfortably against your side. Squeeze a rolled towel between your elbow and your body for comfort. This will help keep your arm at your side. 3. Hold one end of the elastic band in the hand of the painful arm. 4. Slowly rotate your forearm toward your body until it touches your belly. Slowly move it back to where you started. 5. Keep your elbow and upper arm firmly tucked against the towel roll or at your side. 6. Repeat 8 to 12 times.   Pendulum swing    If you have pain in your back, do not do this exercise. 1. Hold on to a table or the back of a chair with your good arm. Then bend forward a little and let your sore arm hang straight down. This exercise does not use the arm muscles. Rather, use your legs and your hips to create movement that makes your arm swing freely. 2. Use the movement from your hips and legs to guide the slightly swinging arm back and forth like a pendulum (or elephant trunk). Then guide it in circles that start small (about the size of a dinner plate). Make the circles a bit larger each day, as your pain allows. 3. Do this exercise for 5 minutes, 5 to 7 times each day. 4. As you have less pain, try bending over a little farther to do this exercise. This will increase the amount of movement at your shoulder. Follow-up care is a key part of your treatment and safety. Be sure to make and go to all appointments, and call your doctor if you are having problems. It's also a good idea to know your test results and keep alist of the medicines you take. Where can you learn more? Go to https://[employer]. org and sign in to your [employer] account. Enter H562 in the [employer] box to learn more about \"Shoulder Arthritis: Exercises. \"     If you do not have an account, please click on the \"Sign Up Now\" link. Current as of: July 1, 2021               Content Version: 13.2  © 2006-2022 Healthwise, Incorporated. Care instructions adapted under license by [facility] ([facility]). If you have questions about a medical condition or this instruction, always ask your healthcare professional. [place] [address] any warranty or liability for your use of this information. Patient Education        Rotator Cuff: Exercises  Introduction  Here are some examples of exercises for you to try. The exercises may be suggested for a condition or for rehabilitation. Start each exercise slowly. Ease off the exercises if you start to have pain.   You will be told when to start these exercises and which ones will work bestfor you. How to do the exercises  Pendulum swing    If you have pain in your back, do not do this exercise. 1. Hold on to a table or the back of a chair with your good arm. Then bend forward a little and let your sore arm hang straight down. This exercise does not use the arm muscles. Rather, use your legs and your hips to create movement that makes your arm swing freely. 2. Use the movement from your hips and legs to guide the slightly swinging arm back and forth like a pendulum (or elephant trunk). Then guide it in circles that start small (about the size of a dinner plate). Make the circles a bit larger each day, as your pain allows. 3. Do this exercise for 5 minutes, 5 to 7 times each day. 4. As you have less pain, try bending over a little farther to do this exercise. This will increase the amount of movement at your shoulder. Posterior stretching exercise    1. Hold the elbow of your injured arm with your other hand. 2. Use your hand to pull your injured arm gently up and across your body. You will feel a gentle stretch across the back of your injured shoulder. 3. Hold for at least 15 to 30 seconds. Then slowly lower your arm. 4. Repeat 2 to 4 times. Up-the-back stretch    Your doctor or physical therapist may want you to wait to do this stretch until you have regained most of your range of motion and strength. You can do this stretch in different ways. Hold any of these stretches for at least 15 to 30seconds. Repeat them 2 to 4 times. 1. Light stretch: Put your hand in your back pocket. Let it rest there to stretch your shoulder. 2. Moderate stretch: With your other hand, hold your injured arm (palm outward) behind your back by the wrist. Pull your arm up gently to stretch your shoulder. 3. Advanced stretch: Put a towel over your other shoulder. Put the hand of your injured arm behind your back.  Now hold the back end of the towel. With the other hand, hold the front end of the towel in front of your body. Pull gently on the front end of the towel. This will bring your hand farther up your back to stretch your shoulder. Overhead stretch    1. Standing about an arm's length away, grasp onto a solid surface. You could use a countertop, a doorknob, or the back of a sturdy chair. 2. With your knees slightly bent, bend forward with your arms straight. Lower your upper body, and let your shoulders stretch. 3. As your shoulders are able to stretch farther, you may need to take a step or two backward. 4. Hold for at least 15 to 30 seconds. Then stand up and relax. If you had stepped back during your stretch, step forward so you can keep your hands on the solid surface. 5. Repeat 2 to 4 times. Shoulder flexion (lying down)    To make a wand for this exercise, use a piece of PVC pipe or a broom handlewith the broom removed. Make the wand about a foot wider than your shoulders. 1. Lie on your back, holding a wand with both hands. Your palms should face down as you hold the wand. 2. Keeping your elbows straight, slowly raise your arms over your head. Raise them until you feel a stretch in your shoulders, upper back, and chest.  3. Hold for 15 to 30 seconds. 4. Repeat 2 to 4 times. Shoulder rotation (lying down)    To make a wand for this exercise, use a piece of PVC pipe or a broom handlewith the broom removed. Make the wand about a foot wider than your shoulders. 1. Lie on your back. Hold a wand with both hands with your elbows bent and palms up. 2. Keep your elbows close to your body, and move the wand across your body toward the sore arm. 3. Hold for 8 to 12 seconds. 4. Repeat 2 to 4 times. Wall climbing (to the side)    Avoid any movement that is straight to your side, and be careful not to archyour back. Your arm should stay about 30 degrees to the front of your side.   1. Stand with your side to a wall so that your fingers can just touch it at an angle about 30 degrees toward the front of your body. 2. Walk the fingers of your injured arm up the wall as high as pain permits. Try not to shrug your shoulder up toward your ear as you move your arm up. 3. Hold that position for a count of at least 15 to 20.  4. Walk your fingers back down to the starting position. 5. Repeat at least 2 to 4 times. Try to reach higher each time. Wall climbing (to the front)    During this stretching exercise, be careful not to arch your back. 1. Face a wall, and stand so your fingers can just touch it. 2. Keeping your shoulder down, walk the fingers of your injured arm up the wall as high as pain permits. (Don't shrug your shoulder up toward your ear.)  3. Hold your arm in that position for at least 15 to 30 seconds. 4. Slowly walk your fingers back down to where you started. 5. Repeat at least 2 to 4 times. Try to reach higher each time. Shoulder blade squeeze    1. Stand with your arms at your sides, and squeeze your shoulder blades together. Do not raise your shoulders up as you squeeze. 2. Hold 6 seconds. 3. Repeat 8 to 12 times. Scapular exercise: Arm reach    1. Lie flat on your back. This exercise is a very slight motion that starts with your arms raised (elbows straight, arms straight). 2. From this position, reach higher toward the primitivo or ceiling. Keep your elbows straight. All motion should be from your shoulder blade only. 3. Relax your arms back to where you started. 4. Repeat 8 to 12 times. Arm raise to the side    During this strengthening exercise, your arm should stay about 30 degrees tothe front of your side. 1. Slowly raise your injured arm to the side, with your thumb facing up. Raise your arm 60 degrees at the most (shoulder level is 90 degrees). 2. Hold the position for 3 to 5 seconds. Then lower your arm back to your side.  If you need to, bring your \"good\" arm across your body and place it under the elbow as you lower your injured arm. Use your good arm to keep your injured arm from dropping down too fast.  3. Repeat 8 to 12 times. 4. When you first start out, don't hold any extra weight in your hand. As you get stronger, you may use a 1-pound to 2-pound dumbbell or a small can of food. Shoulder flexor and extensor exercise    These are isometric exercises. That means you contract your muscles withoutactually moving. 1. Push forward (flex): Stand facing a wall or doorjamb, about 6 inches or less back. Hold your injured arm against your body. Make a closed fist with your thumb on top. Then gently push your hand forward into the wall with about 25% to 50% of your strength. Don't let your body move backward as you push. Hold for about 6 seconds. Relax for a few seconds. Repeat 8 to 12 times. 2. Push backward (extend): Stand with your back flat against a wall. Your upper arm should be against the wall, with your elbow bent 90 degrees (your hand straight ahead). Push your elbow gently back against the wall with about 25% to 50% of your strength. Don't let your body move forward as you push. Hold for about 6 seconds. Relax for a few seconds. Repeat 8 to 12 times. Scapular exercise: Wall push-ups    This exercise is best done with your fingers somewhat turned out, rather thanstraight up and down. 1. Stand facing a wall, about 12 inches to 18 inches away. 2. Place your hands on the wall at shoulder height. 3. Slowly bend your elbows and bring your face to the wall. Keep your back and hips straight. 4. Push back to where you started. 5. Repeat 8 to 12 times. 6. When you can do this exercise against a wall comfortably, you can try it against a counter. You can then slowly progress to the end of a couch, then to a sturdy chair, and finally to the floor. Scapular exercise: Retraction    For this exercise, you will need elastic exercise material, such as surgicaltubing or Thera-Band.   1. Put the band around a solid object at about waist level. (A bedpost will work well.) Each hand should hold an end of the band. 2. With your elbows at your sides and bent to 90 degrees, pull the band back. Your shoulder blades should move toward each other. Then move your arms back where you started. 3. Repeat 8 to 12 times. 4. If you have good range of motion in your shoulders, try this exercise with your arms lifted out to the sides. Keep your elbows at a 90-degree angle. Raise the elastic band up to about shoulder level. Pull the band back to move your shoulder blades toward each other. Then move your arms back where you started. Internal rotator strengthening exercise    1. Start by tying a piece of elastic exercise material to a doorknob. You can use surgical tubing or Thera-Band. 2. Stand or sit with your shoulder relaxed and your elbow bent 90 degrees. Your upper arm should rest comfortably against your side. Squeeze a rolled towel between your elbow and your body for comfort. This will help keep your arm at your side. 3. Hold one end of the elastic band in the hand of the painful arm. 4. Slowly rotate your forearm toward your body until it touches your belly. Slowly move it back to where you started. 5. Keep your elbow and upper arm firmly tucked against the towel roll or at your side. 6. Repeat 8 to 12 times. External rotator strengthening exercise    1. Start by tying a piece of elastic exercise material to a doorknob. You can use surgical tubing or Thera-Band. (You may also hold one end of the band in each hand.)  2. Stand or sit with your shoulder relaxed and your elbow bent 90 degrees. Your upper arm should rest comfortably against your side. Squeeze a rolled towel between your elbow and your body for comfort. This will help keep your arm at your side. 3. Hold one end of the elastic band with the hand of the painful arm. 4. Start with your forearm across your belly. Slowly rotate the forearm out away from your body.  Keep your elbow and upper arm tucked against the towel roll or the side of your body until you begin to feel tightness in your shoulder. Slowly move your arm back to where you started. 5. Repeat 8 to 12 times. Follow-up care is a key part of your treatment and safety. Be sure to make and go to all appointments, and call your doctor if you are having problems. It's also a good idea to know your test results and keep alist of the medicines you take. Where can you learn more? Go to https://[employer]. org and sign in to your [employer] account. Enter Kervin Forte in the [employer] box to learn more about \"Rotator Cuff: Exercises. \"     If you do not have an account, please click on the \"Sign Up Now\" link. Current as of: July 1, 2021               Content Version: 13.2  © 2006-2022 Healthwise, Incorporated. Care instructions adapted under license by [facility] ([facility]). If you have questions about a medical condition or this instruction, always ask your healthcare professional. [address] any warranty or liability for your use of this information.

## 2022-04-27 NOTE — PROGRESS NOTES
22  [facility] [place] : 1933 Sex: female  Age: 80 y.o. Chief Complaint   Patient presents with    Shoulder Pain     left shoulder - no injury       HPI:  Patient reports pain to the left shoulder. The patient states the pain has been present chronically but pain has worsened recently. Pain is currently on a 8-9/10 on the pain scale. Uncertain if hit door with shoulder area. The patient denies any trauma or injury. They deny numbness or tingling to the distal extremity. No neck pain. The patient is having difficulty using the affected arm. It is affecting their quality of life due to difficulty performing activities of daily living. States located in the shoulder and pain in upper arm. No swelling. No erythema. They are taking tylenol and norco for chronic bacl pain at home with minimal releif of their discomfort. States has tried ice and heat and topical but nothing seems to help. They present for further evaluation. ROS:  Const: Positives and pertinent negatives as per HPI. All others reviewed and are negative. Current Outpatient Medications:     methylPREDNISolone (MEDROL DOSEPACK) 4 MG tablet, Take by mouth., Disp: 1 kit, Rfl: 0    atorvastatin (LIPITOR) 20 MG tablet, Take 1 tablet by mouth daily, Disp: 90 tablet, Rfl: 1    chlorthalidone (HYGROTON) 25 MG tablet, Take 0.5 tablets by mouth daily, Disp: 45 tablet, Rfl: 1    HYDROcodone-acetaminophen (NORCO) 5-325 MG per tablet, Take 1 tablet by mouth every 8 hours as needed for Pain for up to 30 days. , Disp: 90 tablet, Rfl: 0    levothyroxine (SYNTHROID) 75 MCG tablet, Take 1 tablet by mouth Daily, Disp: 90 tablet, Rfl: 1    lisinopril (PRINIVIL;ZESTRIL) 10 MG tablet, Take 1 tablet by mouth daily, Disp: 90 tablet, Rfl: 1    omeprazole (PRILOSEC) 40 MG delayed release capsule, Take 1 capsule by mouth daily Instructed to take with sip water am of procedure, Disp: 90 capsule, Rfl: 1    sertraline (ZOLOFT) 100 MG tablet, Take 1 tablet by mouth daily, Disp: 90 tablet, Rfl: 1    terazosin (HYTRIN) 2 MG capsule, Take 1 capsule by mouth nightly, Disp: 90 capsule, Rfl: 1    verapamil (CALAN SR) 240 MG extended release tablet, Take 1 tablet by mouth daily, Disp: 90 tablet, Rfl: 1    ammonium lactate (LAC-HYDRIN) 12 % lotion, Apply topically twice daily, Disp: 400 g, Rfl: 2    amoxicillin (AMOXIL) 500 MG capsule, 4 tabs 1 hr prior to dental appt, Disp: , Rfl:     aspirin 81 MG tablet, Take 81 mg by mouth daily, Disp: , Rfl:     Multiple Vitamins-Minerals (CENTRUM SILVER PO), Take 500 mg by mouth daily, Disp: , Rfl:     ascorbic acid (VITAMIN C) 500 MG tablet, Take 1,000 mg by mouth daily. 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    Thyroid disease      Past Surgical History:   Procedure Laterality Date    CHOLECYSTECTOMY      EYE SURGERY      bilat cataract    HYSTERECTOMY      LUMBAR LAMINECTOMY  05/29/2013    L3-L4 with microdiscectomy    [place] TOTAL KNEE ARTHROPLASTY Right 5/1/2018    RIGHT KNEE TOTAL ARTHROPLASTY (CHARLES)---PRP---PNB--- performed by Pierce Yeboah [facility] at [facility] OR     Family History   Problem Relation Age of Onset    Cancer Mother         stomach    Heart Attack Father     Coronary Art Dis Father      Social History     Socioeconomic History    Marital status:       Spouse name: Not on file    Number of children: Not on file    Years of education: Not on file    Highest education level: Not on file   Occupational History    Not on file Tobacco Use    Smoking status: Never Smoker    Smokeless tobacco: Never Used   Substance and Sexual Activity    Alcohol use: No    Drug use: No    Sexual activity: Not on file   Other Topics Concern    Not on file   Social History Narrative    Not on file     Social Determinants of Health     Financial Resource Strain: Unknown    Difficulty of Paying Living Expenses: Patient refused   Food Insecurity: Unknown    Worried About Running Out of Food in the Last Year: Patient refused    [address] in the Last Year: Patient refused   Transportation Needs:     Lack of Transportation (Medical): Not on file    Lack of Transportation (Non-Medical): Not on file   Physical Activity:     Days of Exercise per Week: Not on file    Minutes of Exercise per Session: Not on file   Stress:     Feeling of Stress : Not on file   Social Connections:     Frequency of Communication with Friends and Family: Not on file    Frequency of Social Gatherings with Friends and Family: Not on file    Attends [employer] Services: Not on file    Active Member of [address] [place] [address] or Organizations: Not on file    Attends Club or Organization Meetings: Not on file    Marital Status: Not on file   Intimate Partner Violence:     Fear of Current or Ex-Partner: Not on file    Emotionally Abused: Not on file    Physically Abused: Not on file    Sexually Abused: Not on file   Housing Stability:     Unable to Pay for Housing in the Last Year: Not on file    Number of [place] in the Last Year: Not on file    Unstable Housing in the Last Year: Not on file       Vitals:    04/27/22 0928   BP: (!) 150/60   Pulse: 75   Resp: 16   Temp: 97.7 °F (36.5 °C)   TempSrc: Temporal   SpO2: 97%   Weight: 158 lb (71.7 kg)   Height: 5' 3\" (1.6 m)       Exam:  Physical Exam  Vitals reviewed. Constitutional:       Appearance: She is well-developed. HENT:      Head: Normocephalic and atraumatic.       Right Ear: External ear normal.      Left Ear: External ear normal.   Eyes:      Pupils: Pupils are equal, round, and reactive to light. Neck:      Thyroid: No thyromegaly. Vascular: Carotid bruit present. Cardiovascular:      Rate and Rhythm: Normal rate and regular rhythm. Heart sounds: Murmur heard. Pulmonary:      Effort: Pulmonary effort is normal.      Breath sounds: Normal breath sounds. No wheezing. Abdominal:      General: Bowel sounds are normal. There is no distension. Palpations: Abdomen is soft. Tenderness: There is no abdominal tenderness. There is no guarding or rebound. Musculoskeletal:      Left shoulder: Tenderness and bony tenderness present. Decreased range of motion. Cervical back: Normal range of motion and neck supple. Comments: Tenderness to palpation over acromial clavicular area and scapula  Restricted strength testing due to pain   Limited ROM due to pain    Lymphadenopathy:      Cervical: No cervical adenopathy. Skin:     General: Skin is warm and dry. Neurological:      Mental Status: She is alert and oriented to person, place, and time. Cranial Nerves: No cranial nerve deficit. Psychiatric:         Judgment: Judgment normal.         Assessment and Plan:   Inocente Clark was seen today for shoulder pain. Diagnoses and all orders for this visit:    Chronic left shoulder pain  Comments:  uncertain as to exact cause, poss OA, tendonitis, rotator  will check xray   medrol pack  referral to ortho -poss injection  decline PT  home exercises given   Orders:  -     XR SHOULDER LEFT (MIN 2 VIEWS); Future  -     methylPREDNISolone (MEDROL DOSEPACK) 4 MG tablet; Take by mouth. Yana Samuel MD, Orthopaedics, [place] ([facility])    Follow-up with your primary care provider in 7-10 days for re-evaluation and further management. Return  sooner or go to the Emergency Department immediately if your condition changes or worsens. Return for check up and review, as scheduled.       Tara Mendoza Kole Amato MD

## 2022-06-23 DIAGNOSIS — Z79.899 LONG TERM CURRENT USE OF THERAPEUTIC DRUG: ICD-10-CM

## 2022-06-23 DIAGNOSIS — R73.01 IMPAIRED FASTING GLUCOSE: ICD-10-CM

## 2022-06-23 DIAGNOSIS — E78.2 MIXED HYPERLIPIDEMIA: ICD-10-CM

## 2022-06-23 LAB
ALBUMIN SERPL-MCNC: 3.9 G/DL (ref 3.5–5.2)
ALP BLD-CCNC: 103 U/L (ref 35–104)
ALT SERPL-CCNC: 25 U/L (ref 0–32)
ANION GAP SERPL CALCULATED.3IONS-SCNC: 16 MMOL/L (ref 7–16)
AST SERPL-CCNC: 17 U/L (ref 0–31)
BACTERIA: ABNORMAL /HPF
BASOPHILS ABSOLUTE: 0.02 E9/L (ref 0–0.2)
BASOPHILS RELATIVE PERCENT: 0.2 % (ref 0–2)
BILIRUB SERPL-MCNC: 0.4 MG/DL (ref 0–1.2)
BILIRUBIN URINE: ABNORMAL
BLOOD, URINE: NEGATIVE
BUN BLDV-MCNC: 35 MG/DL (ref 6–23)
CALCIUM SERPL-MCNC: 8.9 MG/DL (ref 8.6–10.2)
CHLORIDE BLD-SCNC: 107 MMOL/L (ref 98–107)
CHOLESTEROL, FASTING: 178 MG/DL (ref 0–199)
CLARITY: CLEAR
CO2: 18 MMOL/L (ref 22–29)
COLOR: YELLOW
CREAT SERPL-MCNC: 1.5 MG/DL (ref 0.5–1)
EOSINOPHILS ABSOLUTE: 0.11 E9/L (ref 0.05–0.5)
EOSINOPHILS RELATIVE PERCENT: 1.1 % (ref 0–6)
FOLATE: >20 NG/ML (ref 4.8–24.2)
GFR AFRICAN AMERICAN: 40
GFR NON-AFRICAN AMERICAN: 33 ML/MIN/1.73
GLUCOSE BLD-MCNC: 99 MG/DL (ref 74–99)
GLUCOSE URINE: NEGATIVE MG/DL
HBA1C MFR BLD: 5.5 % (ref 4–5.6)
HCT VFR BLD CALC: 39.6 % (ref 34–48)
HDLC SERPL-MCNC: 70 MG/DL
HEMOGLOBIN: 12.6 G/DL (ref 11.5–15.5)
IMMATURE GRANULOCYTES #: 0.05 E9/L
IMMATURE GRANULOCYTES %: 0.5 % (ref 0–5)
KETONES, URINE: ABNORMAL MG/DL
LDL CHOLESTEROL CALCULATED: 91 MG/DL (ref 0–99)
LEUKOCYTE ESTERASE, URINE: ABNORMAL
LYMPHOCYTES ABSOLUTE: 1.65 E9/L (ref 1.5–4)
LYMPHOCYTES RELATIVE PERCENT: 16.8 % (ref 20–42)
MAGNESIUM: 2.2 MG/DL (ref 1.6–2.6)
MCH RBC QN AUTO: 31.4 PG (ref 26–35)
MCHC RBC AUTO-ENTMCNC: 31.8 % (ref 32–34.5)
MCV RBC AUTO: 98.8 FL (ref 80–99.9)
MICROALBUMIN UR-MCNC: 15.2 MG/L
MONOCYTES ABSOLUTE: 0.73 E9/L (ref 0.1–0.95)
MONOCYTES RELATIVE PERCENT: 7.4 % (ref 2–12)
NEUTROPHILS ABSOLUTE: 7.29 E9/L (ref 1.8–7.3)
NEUTROPHILS RELATIVE PERCENT: 74 % (ref 43–80)
NITRITE, URINE: NEGATIVE
PDW BLD-RTO: 13.5 FL (ref 11.5–15)
PH UA: 5.5 (ref 5–9)
PLATELET # BLD: 171 E9/L (ref 130–450)
PMV BLD AUTO: 11.6 FL (ref 7–12)
POTASSIUM SERPL-SCNC: 4.8 MMOL/L (ref 3.5–5)
PROTEIN UA: NEGATIVE MG/DL
RBC # BLD: 4.01 E12/L (ref 3.5–5.5)
RBC UA: ABNORMAL /HPF (ref 0–2)
SODIUM BLD-SCNC: 141 MMOL/L (ref 132–146)
SPECIFIC GRAVITY UA: 1.02 (ref 1–1.03)
TOTAL PROTEIN: 6.3 G/DL (ref 6.4–8.3)
TRIGLYCERIDE, FASTING: 84 MG/DL (ref 0–149)
UROBILINOGEN, URINE: 0.2 E.U./DL
VITAMIN B-12: 386 PG/ML (ref 211–946)
VLDLC SERPL CALC-MCNC: 17 MG/DL
WBC # BLD: 9.9 E9/L (ref 4.5–11.5)
WBC UA: ABNORMAL /HPF (ref 0–5)

## 2022-06-25 ENCOUNTER — TELEPHONE (OUTPATIENT)
Dept: PRIMARY CARE CLINIC | Age: 87
End: 2022-06-25

## 2022-06-25 NOTE — TELEPHONE ENCOUNTER
Please let the patient know that blood work results showed    Labs showed worsening kidney function. This could be related to blood pressure medicines. This could be also related to longstanding high blood pressure. Would increase fluids. Would avoid NSAID type medication. Would recommend follow-up with nephrology    Bicarbonate level was also decreased. This could be related to medication effect as well as kidney dysfunction    Cholesterol levels were stable. Recommend to continue present medications    Vitamin B12 level was in normal range but on the low end of normal and would recommend vitamin B12 supplement    Folic acid levels are normal    Urine analysis showed ketones white blood cells bilirubin and slight bacteria. No microscopic protein.  some findings could be related to dehydration certain if having symptoms of infection    Blood counts were normal    Hemoglobin A1c is a measure 3-month sugar control was improved at 5.5 no evidence for prediabetes or diabetes    Other electrolytes and liver functions were normal    Thanks

## 2022-07-05 ENCOUNTER — OFFICE VISIT (OUTPATIENT)
Dept: FAMILY MEDICINE CLINIC | Age: 87
End: 2022-07-05
Payer: MEDICARE

## 2022-07-05 VITALS
HEART RATE: 89 BPM | OXYGEN SATURATION: 98 % | RESPIRATION RATE: 20 BRPM | WEIGHT: 150 LBS | DIASTOLIC BLOOD PRESSURE: 58 MMHG | HEIGHT: 63 IN | SYSTOLIC BLOOD PRESSURE: 112 MMHG | BODY MASS INDEX: 26.58 KG/M2 | TEMPERATURE: 98 F

## 2022-07-05 DIAGNOSIS — F32.0 CURRENT MILD EPISODE OF MAJOR DEPRESSIVE DISORDER, UNSPECIFIED WHETHER RECURRENT (HCC): ICD-10-CM

## 2022-07-05 DIAGNOSIS — M54.50 CHRONIC BILATERAL LOW BACK PAIN WITHOUT SCIATICA: Primary | ICD-10-CM

## 2022-07-05 DIAGNOSIS — R73.01 IMPAIRED FASTING GLUCOSE: ICD-10-CM

## 2022-07-05 DIAGNOSIS — N18.32 STAGE 3B CHRONIC KIDNEY DISEASE (HCC): ICD-10-CM

## 2022-07-05 DIAGNOSIS — N39.3 STRESS INCONTINENCE OF URINE: ICD-10-CM

## 2022-07-05 DIAGNOSIS — I10 ESSENTIAL HYPERTENSION: ICD-10-CM

## 2022-07-05 DIAGNOSIS — D34 HURTHLE CELL TUMOR: ICD-10-CM

## 2022-07-05 DIAGNOSIS — G89.29 CHRONIC BILATERAL LOW BACK PAIN WITHOUT SCIATICA: Primary | ICD-10-CM

## 2022-07-05 DIAGNOSIS — E78.2 MIXED HYPERLIPIDEMIA: ICD-10-CM

## 2022-07-05 DIAGNOSIS — E89.0 POSTOPERATIVE HYPOTHYROIDISM: ICD-10-CM

## 2022-07-05 DIAGNOSIS — Z79.899 LONG TERM CURRENT USE OF THERAPEUTIC DRUG: ICD-10-CM

## 2022-07-05 DIAGNOSIS — M15.9 GENERALIZED OSTEOARTHRITIS: ICD-10-CM

## 2022-07-05 DIAGNOSIS — K21.9 GASTROESOPHAGEAL REFLUX DISEASE WITHOUT ESOPHAGITIS: ICD-10-CM

## 2022-07-05 DIAGNOSIS — I65.21 STENOSIS OF RIGHT CAROTID ARTERY: ICD-10-CM

## 2022-07-05 PROCEDURE — 99214 OFFICE O/P EST MOD 30 MIN: CPT | Performed by: INTERNAL MEDICINE

## 2022-07-05 PROCEDURE — 1123F ACP DISCUSS/DSCN MKR DOCD: CPT | Performed by: INTERNAL MEDICINE

## 2022-07-05 RX ORDER — HYDROCODONE BITARTRATE AND ACETAMINOPHEN 5; 325 MG/1; MG/1
1 TABLET ORAL EVERY 8 HOURS PRN
Qty: 90 TABLET | Refills: 0 | Status: SHIPPED | OUTPATIENT
Start: 2022-07-05 | End: 2022-09-06 | Stop reason: SDUPTHER

## 2022-07-05 ASSESSMENT — PATIENT HEALTH QUESTIONNAIRE - PHQ9
DEPRESSION UNABLE TO ASSESS: YES
1. LITTLE INTEREST OR PLEASURE IN DOING THINGS: SEVERAL DAYS
SUM OF ALL RESPONSES TO PHQ QUESTIONS 1-9: 5
8. MOVING OR SPEAKING SO SLOWLY THAT OTHER PEOPLE COULD HAVE NOTICED. OR THE OPPOSITE, BEING SO FIGETY OR RESTLESS THAT YOU HAVE BEEN MOVING AROUND A LOT MORE THAN USUAL: 0
4. FEELING TIRED OR HAVING LITTLE ENERGY: 2
1. LITTLE INTEREST OR PLEASURE IN DOING THINGS: 1
10. IF YOU CHECKED OFF ANY PROBLEMS, HOW DIFFICULT HAVE THESE PROBLEMS MADE IT FOR YOU TO DO YOUR WORK, TAKE CARE OF THINGS AT HOME, OR GET ALONG WITH OTHER PEOPLE: 0
2. FEELING DOWN, DEPRESSED OR HOPELESS: 1
2. FEELING DOWN, DEPRESSED OR HOPELESS: SEVERAL DAYS
SUM OF ALL RESPONSES TO PHQ QUESTIONS 1-9: 5
SUM OF ALL RESPONSES TO PHQ9 QUESTIONS 1 & 2: 2
9. THOUGHTS THAT YOU WOULD BE BETTER OFF DEAD, OR OF HURTING YOURSELF: 0
SUM OF ALL RESPONSES TO PHQ QUESTIONS 1-9: 5
3. TROUBLE FALLING OR STAYING ASLEEP: 1
6. FEELING BAD ABOUT YOURSELF - OR THAT YOU ARE A FAILURE OR HAVE LET YOURSELF OR YOUR FAMILY DOWN: 0
SUM OF ALL RESPONSES TO PHQ QUESTIONS 1-9: 5
7. TROUBLE CONCENTRATING ON THINGS, SUCH AS READING THE NEWSPAPER OR WATCHING TELEVISION: 0
SUM OF ALL RESPONSES TO PHQ9 QUESTIONS 1 & 2: 2
5. POOR APPETITE OR OVEREATING: 0

## 2022-07-05 ASSESSMENT — ENCOUNTER SYMPTOMS
BLOOD IN STOOL: 0
EYE PAIN: 0
COUGH: 0
DIARRHEA: 0
BACK PAIN: 1
ABDOMINAL PAIN: 0
RHINORRHEA: 0
VOMITING: 0
SORE THROAT: 0
CONSTIPATION: 0
SHORTNESS OF BREATH: 0
CHEST TIGHTNESS: 0
NAUSEA: 0

## 2022-07-05 NOTE — PROGRESS NOTES
[address]  Physicians   Internal Medicine     2022  Omaira Quiñonez : 1933 Sex: female  Age:88 y.o. Chief Complaint   Patient presents with    Follow-up    Cholesterol Problem    Hypertension    Thyroid Problem    Discuss Labs        HPI:   Patient presents to office for evaluation for the following medical concerns. - reports pain to the left shoulder. The patient states the pain has been present chronically but pain has worsened recently. States located in the shoulder and pain in upper arm. No swelling. No erythema. Had seen ortho. Was given an injection and helped. - States having some back discomfort. . States ache that is constant. Improved if sits or lies down. Worse with walking and activity. States located to lowe mis back. No radiation. No numbness, tingling. States occastional weakness in legs. Following with pain management. States has had () - median nerve branch block L4-5 and L5-S1 () - RFA of median branches of rt l4-5 and l5-s1. States taking hydrocodone daily, No reported side effects. Helping.     - States has been fatigued more recently. States Some SOB. No chest pain/discomfort. No lightheaded or dizzy. States worse with activity.     - States labs showed renal insuffiencey. Was not sure if related to new medications or increased blood pressure or both. Has seen nephrology. Medications adjusted. (10/20) - added chlorthialidone 12.5mg, f/u 12 months. Last lab (2022) showed change in dysfunction.     - States has high blood pressure.  does check blood pressure at home occasionally at home, range 120's. On verapamil,  terazosin, On lisinopril and chlorthialidone 12.5mg. No reported side effects. No lightheaded or dizzy. No headaches. No new vision changes. No loc/syncope. No chest pain or SOB. - States has high cholesterol, try's to watch diet, on atorvastatin. No reported side effects     - States depression symptoms are stable.  On Zoloft, helping. No side effects. States Benjamin season is a difficult time of year. Declines counseling. 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 No side effects reported. Administration instructions reviewed. Last Lab (9/2021) - stable. - States has urinary incontinence - stable with meds (terazosin). States urinary frequency. States stable. - States has GERD, on omeprazole - Stable with omeprazole dose. - States has had right total knee arthoplasty for arthritis. Still having right Knee pain. States also has some back pain. States also right wrist pain. States following with ortho (Dr. Anuel Whitmore). States had compression stockings. States also using heating pad to back. States needs refill of meds. Takes Norco mostly at night - helping. No reported side effects or complications reported. - states having left foot pain. States Has seen podiatry. Has bunion and hammer toes. Recommend pad. Still with discomfort.     - States has chronic rhinitis. Stable. States no cough. States nasal congestion. No sore throat. No chest pain, No fever or chills. No nausea or vomiting.      Labs from 6/23/2022 reviewed with patient 7/5/2022     Health Maintenance   - immunizations:   Influenza Vaccination - (9/21/2016) , (10/2017), (10/2018), (2019), (9/15/20), (2021)  Pneumonia Vaccination - (12/2015) - prevnar, (12/2016) - pneumonoccal  Zoster/Shingles Vaccine  Tetanus Vaccination - (12/15/2015), (1/1/2020) - tdap   covid (1/20/2021) #1, (2/17/2021) #2, (10/2021) #3  (04/07/2022) #4 - moderna    - Screenings:   Bone Density Scan - (2/28/2019)   Pelvic/Pap Exam  Mammogram - (8/14/2014), (4/2019) - neg     Colonoscopy - (2011) - neg per patient    Couseled on Home Safety - (11/28/2017)    Carotid Artery Study - (3/2016) - <50% b/l, also showed thyroid nodules,  (12/2019) - < 50% b/l     ROS:  Review of Systems   Constitutional: Negative for appetite change, chills, fever and unexpected weight change. HENT: Negative for congestion, rhinorrhea and sore throat. Eyes: Negative for pain and visual disturbance. Respiratory: Negative for cough, chest tightness and shortness of breath. Cardiovascular: Negative for chest pain and palpitations. Gastrointestinal: Negative for abdominal pain, blood in stool, constipation, diarrhea, nausea and vomiting. Genitourinary: Negative for difficulty urinating, dysuria, frequency, pelvic pain, urgency and vaginal bleeding. Musculoskeletal: Positive for arthralgias and back pain. Negative for myalgias. Skin: Negative for rash. Neurological: Negative for dizziness, syncope, weakness, light-headedness, numbness and headaches. Hematological: Negative for adenopathy. Psychiatric/Behavioral: Negative for suicidal ideas. The patient is not nervous/anxious. Current Outpatient Medications:     HYDROcodone-acetaminophen (NORCO) 5-325 MG per tablet, Take 1 tablet by mouth every 8 hours as needed for Pain for up to 30 days. , Disp: 90 tablet, Rfl: 0    atorvastatin (LIPITOR) 20 MG tablet, Take 1 tablet by mouth daily, Disp: 90 tablet, Rfl: 1    chlorthalidone (HYGROTON) 25 MG tablet, Take 0.5 tablets by mouth daily, Disp: 45 tablet, Rfl: 1    levothyroxine (SYNTHROID) 75 MCG tablet, Take 1 tablet by mouth Daily, Disp: 90 tablet, Rfl: 1    lisinopril (PRINIVIL;ZESTRIL) 10 MG tablet, Take 1 tablet by mouth daily, Disp: 90 tablet, Rfl: 1    omeprazole (PRILOSEC) 40 MG delayed release capsule, Take 1 capsule by mouth daily Instructed to take with sip water am of procedure, Disp: 90 capsule, Rfl: 1    sertraline (ZOLOFT) 100 MG tablet, Take 1 tablet by mouth daily, Disp: 90 tablet, Rfl: 1    terazosin (HYTRIN) 2 MG capsule, Take 1 capsule by mouth nightly, Disp: 90 capsule, Rfl: 1    verapamil (CALAN SR) 240 MG extended release tablet, Take 1 tablet by mouth daily, Disp: 90 tablet, Rfl: 1   ammonium lactate (LAC-HYDRIN) 12 % lotion, Apply topically twice daily, Disp: 400 g, Rfl: 2    amoxicillin (AMOXIL) 500 MG capsule, 4 tabs 1 hr prior to dental appt, Disp: , Rfl:     aspirin 81 MG tablet, Take 81 mg by mouth daily, Disp: , Rfl:     Multiple Vitamins-Minerals (CENTRUM SILVER PO), Take 500 mg by mouth daily, Disp: , Rfl:     ascorbic acid (VITAMIN C) 500 MG tablet, Take 1,000 mg by mouth daily. 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    Presence of right artificial knee joint 5/14/2019    Thyroid disease        Past Surgical History:   Procedure Laterality Date    CHOLECYSTECTOMY      EYE SURGERY      bilat cataract    HYSTERECTOMY      LUMBAR LAMINECTOMY  05/29/2013    L3-L4 with microdiscectomy    [place] TOTAL KNEE ARTHROPLASTY Right 5/1/2018    RIGHT KNEE TOTAL ARTHROPLASTY (CHARLES)---PRP---PNB--- performed by Jean Claude Womack [facility] at [facility] OR       Family History   Problem Relation Age of Onset    Cancer Mother         stomach    Heart Attack Father     Coronary Art Dis Father        Social History     Socioeconomic History    Marital status:       Spouse name: Not on file    Number of children: Not on file    Years of education: Not on file    Highest education level: Not on file   Occupational History    Not on file   Tobacco Use    Smoking status: Never Smoker    Smokeless tobacco: Never Used   Substance and Sexual Activity    Alcohol use: No    Drug use: No    Sexual activity: Not on file   Other Topics Concern    Not on file   Social History Narrative  Not on file     Social Determinants of Health     Financial Resource Strain: Unknown    Difficulty of Paying Living Expenses: Patient refused   Food Insecurity: Unknown    Worried About Running Out of Food in the Last Year: Patient refused    [address] in the Last Year: Patient refused   Transportation Needs:     Lack of Transportation (Medical): Not on file    Lack of Transportation (Non-Medical): Not on file   Physical Activity:     Days of Exercise per Week: Not on file    Minutes of Exercise per Session: Not on file   Stress:     Feeling of Stress : Not on file   Social Connections:     Frequency of Communication with Friends and Family: Not on file    Frequency of Social Gatherings with Friends and Family: Not on file    Attends Sabian[employer] Services: Not on file    Active Member of [address] [place] [address] [employer] or Organizations: Not on file    Attends Club or Organization Meetings: Not on file    Marital Status: Not on file   Intimate Partner Violence:     Fear of Current or Ex-Partner: Not on file    Emotionally Abused: Not on file    Physically Abused: Not on file    Sexually Abused: Not on file   Housing Stability:     Unable to Pay for Housing in the Last Year: Not on file    Number of [place] in the Last Year: Not on file    Unstable Housing in the Last Year: Not on file       Vitals:    07/05/22 1259   BP: (!) 112/58   Pulse: 89   Resp: 20   Temp: 98 °F (36.7 °C)   TempSrc: Temporal   SpO2: 98%   Weight: 150 lb (68 kg)   Height: 5' 3\" (1.6 m)       Exam:  Physical Exam  Vitals reviewed. Constitutional:       Appearance: She is well-developed. HENT:      Head: Normocephalic and atraumatic. Right Ear: External ear normal.      Left Ear: External ear normal.   Eyes:      Pupils: Pupils are equal, round, and reactive to light. Neck:      Thyroid: No thyromegaly. Vascular: Carotid bruit present. Cardiovascular:      Rate and Rhythm: Normal rate and regular rhythm.       Heart sounds: Murmur heard. Pulmonary:      Effort: Pulmonary effort is normal.      Breath sounds: Normal breath sounds. No wheezing. Abdominal:      General: Bowel sounds are normal. There is no distension. Palpations: Abdomen is soft. Tenderness: There is no abdominal tenderness. There is no guarding or rebound. Musculoskeletal:      Cervical back: Normal range of motion and neck supple. Lumbar back: Tenderness present. Decreased range of motion. Lymphadenopathy:      Cervical: No cervical adenopathy. Skin:     General: Skin is warm and dry. Neurological:      Mental Status: She is alert and oriented to person, place, and time. Cranial Nerves: No cranial nerve deficit. Psychiatric:         Judgment: Judgment normal.         Assessment and Plan:    Diagnoses and all orders for this visit:    Chronic bilateral low back pain without sciatica  - uncertain as to cause - most prob OA   - home exercises   - discussed physical therapy - declines   - following with pain management   - continue norco as prescribed - states has been able to decrease (9/2021)   - s/p median nerve branch block L4-5 and L5-S1, helped (9/2021)   - (12/21) - RFA of median branches of rt l4-5 and l5-s1  - stable with meds   - advised follow up with pain management     Stage 3a chronic kidney disease  - uncertain etiology   - May be multifactorial - new medications, long standing HTN   - US kidney ok   - not currently following with nephrology   - (10/20) - added chlorthialidone 12.5mg  - last lab (6/2022) showed change   - will hold chlorthalidone (6/2022)   - refer back to nephrology (6/2022)   - recheck labs at next visit   - recheck US kidney     Essential hypertension  - monitor blood pressure at home  - watch diet - decreased salt.   - on verapamil   - on terasozin   - on lisinopril   - on chlorthialidone 12.5mg   - poss white coat   - improved today 7/5/2022   - will hold chlorthalidone (6/2022)   - refer back to therapy   - follow bone and B12     Return in about 3 months (around 10/5/2022) for check up and review. Orders Placed This Encounter   Procedures    US RETROPERITONEAL COMPLETE     Standing Status:   Future     Standing Expiration Date:   7/5/2023     Order Specific Question:   Reason for exam:     Answer:   worsening kidney function    Comprehensive Metabolic Panel     Standing Status:   Future     Standing Expiration Date:   7/5/2023    Urinalysis     Standing Status:   Future     Standing Expiration Date:   7/5/2023   Abhishek Luna MD, Nephrology, [facility]     Referral Priority:   Routine     Referral Type:   Eval and Treat     Referral Reason:   Specialty Services Required     Referred to Provider:   Frank Craven MD     Requested Specialty:   Nephrology     Number of Visits Requested:   1     Requested Prescriptions     Signed Prescriptions Disp Refills    HYDROcodone-acetaminophen (NORCO) 5-325 MG per tablet 90 tablet 0     Sig: Take 1 tablet by mouth every 8 hours as needed for Pain for up to 30 days.      Asael Watson MD  7/5/2022  1:27 PM

## 2022-07-09 ENCOUNTER — TELEPHONE (OUTPATIENT)
Dept: FAMILY MEDICINE CLINIC | Age: 87
End: 2022-07-09

## 2022-07-10 NOTE — TELEPHONE ENCOUNTER
Please let the patient know that ultrasound of the kidneys per radiology report showed overall the size of the kidneys to be small with a left kidney cyst.  No other masses or other lesions or other abnormalities    Recommend follow-up with nephrology as discussed at office appointment    Thanks

## 2022-07-27 ENCOUNTER — OFFICE VISIT (OUTPATIENT)
Dept: FAMILY MEDICINE CLINIC | Age: 87
End: 2022-07-27
Payer: MEDICARE

## 2022-07-27 VITALS
HEART RATE: 88 BPM | WEIGHT: 149 LBS | HEIGHT: 63 IN | SYSTOLIC BLOOD PRESSURE: 138 MMHG | BODY MASS INDEX: 26.4 KG/M2 | OXYGEN SATURATION: 98 % | RESPIRATION RATE: 18 BRPM | DIASTOLIC BLOOD PRESSURE: 70 MMHG | TEMPERATURE: 98 F

## 2022-07-27 DIAGNOSIS — M54.50 CHRONIC BILATERAL LOW BACK PAIN WITHOUT SCIATICA: Primary | ICD-10-CM

## 2022-07-27 DIAGNOSIS — N18.32 STAGE 3B CHRONIC KIDNEY DISEASE (HCC): ICD-10-CM

## 2022-07-27 DIAGNOSIS — G89.29 CHRONIC BILATERAL LOW BACK PAIN WITHOUT SCIATICA: Primary | ICD-10-CM

## 2022-07-27 DIAGNOSIS — F32.0 CURRENT MILD EPISODE OF MAJOR DEPRESSIVE DISORDER, UNSPECIFIED WHETHER RECURRENT (HCC): ICD-10-CM

## 2022-07-27 DIAGNOSIS — E89.0 POSTOPERATIVE HYPOTHYROIDISM: ICD-10-CM

## 2022-07-27 DIAGNOSIS — M15.9 GENERALIZED OSTEOARTHRITIS: ICD-10-CM

## 2022-07-27 DIAGNOSIS — N39.3 STRESS INCONTINENCE OF URINE: ICD-10-CM

## 2022-07-27 DIAGNOSIS — E78.2 MIXED HYPERLIPIDEMIA: ICD-10-CM

## 2022-07-27 DIAGNOSIS — R06.00 DYSPNEA, UNSPECIFIED TYPE: ICD-10-CM

## 2022-07-27 DIAGNOSIS — R73.01 IMPAIRED FASTING GLUCOSE: ICD-10-CM

## 2022-07-27 DIAGNOSIS — I10 ESSENTIAL HYPERTENSION: ICD-10-CM

## 2022-07-27 DIAGNOSIS — K21.9 GASTROESOPHAGEAL REFLUX DISEASE WITHOUT ESOPHAGITIS: ICD-10-CM

## 2022-07-27 LAB
ALBUMIN SERPL-MCNC: 3.9 G/DL (ref 3.5–5.2)
ALP BLD-CCNC: 85 U/L (ref 35–104)
ALT SERPL-CCNC: 15 U/L (ref 0–32)
AMORPHOUS: ABNORMAL
ANION GAP SERPL CALCULATED.3IONS-SCNC: 17 MMOL/L (ref 7–16)
AST SERPL-CCNC: 18 U/L (ref 0–31)
BACTERIA: ABNORMAL /HPF
BILIRUB SERPL-MCNC: 0.6 MG/DL (ref 0–1.2)
BILIRUBIN URINE: NEGATIVE
BLOOD, URINE: NEGATIVE
BUN BLDV-MCNC: 19 MG/DL (ref 6–23)
CALCIUM SERPL-MCNC: 9.4 MG/DL (ref 8.6–10.2)
CHLORIDE BLD-SCNC: 111 MMOL/L (ref 98–107)
CLARITY: ABNORMAL
CO2: 21 MMOL/L (ref 22–29)
COLOR: YELLOW
CREAT SERPL-MCNC: 1.1 MG/DL (ref 0.5–1)
CRYSTALS, UA: ABNORMAL /HPF
GFR AFRICAN AMERICAN: 57
GFR NON-AFRICAN AMERICAN: 47 ML/MIN/1.73
GLUCOSE BLD-MCNC: 103 MG/DL (ref 74–99)
GLUCOSE URINE: NEGATIVE MG/DL
KETONES, URINE: ABNORMAL MG/DL
LEUKOCYTE ESTERASE, URINE: ABNORMAL
NITRITE, URINE: NEGATIVE
PH UA: 6 (ref 5–9)
POTASSIUM SERPL-SCNC: 4.4 MMOL/L (ref 3.5–5)
PROTEIN UA: NEGATIVE MG/DL
RBC UA: ABNORMAL /HPF (ref 0–2)
SODIUM BLD-SCNC: 149 MMOL/L (ref 132–146)
SPECIFIC GRAVITY UA: 1.02 (ref 1–1.03)
TOTAL PROTEIN: 6.5 G/DL (ref 6.4–8.3)
UROBILINOGEN, URINE: 1 E.U./DL
WBC UA: ABNORMAL /HPF (ref 0–5)

## 2022-07-27 PROCEDURE — 1123F ACP DISCUSS/DSCN MKR DOCD: CPT | Performed by: INTERNAL MEDICINE

## 2022-07-27 PROCEDURE — 99214 OFFICE O/P EST MOD 30 MIN: CPT | Performed by: INTERNAL MEDICINE

## 2022-07-27 RX ORDER — SERTRALINE HYDROCHLORIDE 25 MG/1
25 TABLET, FILM COATED ORAL DAILY
Qty: 30 TABLET | Refills: 2 | Status: SHIPPED
Start: 2022-07-27 | End: 2022-09-06 | Stop reason: SDUPTHER

## 2022-07-27 ASSESSMENT — ENCOUNTER SYMPTOMS
SHORTNESS OF BREATH: 0
EYE PAIN: 0
COUGH: 0
CHEST TIGHTNESS: 0
BLOOD IN STOOL: 0
NAUSEA: 0
RHINORRHEA: 0
BACK PAIN: 1
ABDOMINAL PAIN: 0
CONSTIPATION: 0
SORE THROAT: 0
VOMITING: 0
DIARRHEA: 0

## 2022-07-27 ASSESSMENT — PATIENT HEALTH QUESTIONNAIRE - PHQ9
6. FEELING BAD ABOUT YOURSELF - OR THAT YOU ARE A FAILURE OR HAVE LET YOURSELF OR YOUR FAMILY DOWN: MORE THAN HALF THE DAYS
4. FEELING TIRED OR HAVING LITTLE ENERGY: NEARLY EVERY DAY
9. THOUGHTS THAT YOU WOULD BE BETTER OFF DEAD, OR OF HURTING YOURSELF: NOT AT ALL
SUM OF ALL RESPONSES TO PHQ9 QUESTIONS 1 & 2: 4
3. TROUBLE FALLING OR STAYING ASLEEP: NOT AT ALL
DEPRESSION UNABLE TO ASSESS: YES
2. FEELING DOWN, DEPRESSED OR HOPELESS: MORE THAN HALF THE DAYS
SUM OF ALL RESPONSES TO PHQ QUESTIONS 1-9: 13
7. TROUBLE CONCENTRATING ON THINGS, SUCH AS READING THE NEWSPAPER OR WATCHING TELEVISION: NOT AT ALL
8. MOVING OR SPEAKING SO SLOWLY THAT OTHER PEOPLE COULD HAVE NOTICED. OR THE OPPOSITE, BEING SO FIGETY OR RESTLESS THAT YOU HAVE BEEN MOVING AROUND A LOT MORE THAN USUAL: NEARLY EVERY DAY
1. LITTLE INTEREST OR PLEASURE IN DOING THINGS: MORE THAN HALF THE DAYS
5. POOR APPETITE OR OVEREATING: SEVERAL DAYS

## 2022-07-27 NOTE — PROGRESS NOTES
[facility]   Internal Medicine     2022  Sammy Johnson : 1933 Sex: female  Age:88 y.o. Chief Complaint   Patient presents with    Back Pain     Lower - requesting an x-ray    Dizziness    Extremity Weakness     Legs - when walking    Anxiety        HPI:   Patient presents to office for evaluation for the following medical concerns. - daughter present for visit today (2022)     - States having some back discomfort. . States ache that is constant. Improved if sits or lies down. Worse with walking and activity. States located to lowe mis back. No radiation. No numbness, tingling. States occastional weakness in legs. Following with pain management. States has had () - median nerve branch block L4-5 and L5-S1 () - RFA of median branches of rt l4-5 and l5-s1. States taking hydrocodone daily, No reported side effects some occasional dizzy. States symptoms are worsening.     -  has been fatigued more recently. States Some SOB. No chest pain/discomfort. No lightheaded or dizzy. States worse with activity.     - States labs showed renal insuffiencey. Was not sure if related to new medications or increased blood pressure or both. Has seen nephrology. Medications adjusted. (10/20) - added chlorthialidone 12.5mg, f/u 12 months. Last lab (2022) showed change in dysfunction.     -  has high blood pressure.  does check blood pressure at home occasionally at home, range 120's. On verapamil,  terazosin, On lisinopril and chlorthialidone 12.5mg. No reported side effects. No lightheaded or dizzy. No headaches. No new vision changes. No loc/syncope. No chest pain or SOB. Stopped Chlorthalidone due to change in kidney function.       - States has high cholesterol, try's to watch diet, on atorvastatin. No reported side effects     - States depression symptoms are stable. On Zoloft, helping. No side effects. States  season is a difficult time of year.  Declines <50% b/l, also showed thyroid nodules,  (12/2019) - < 50% b/l     ROS:  Review of Systems   Constitutional:  Negative for appetite change, chills, fever and unexpected weight change. HENT:  Negative for congestion, rhinorrhea and sore throat. Eyes:  Negative for pain and visual disturbance. Respiratory:  Negative for cough, chest tightness and shortness of breath. Cardiovascular:  Negative for chest pain and palpitations. Gastrointestinal:  Negative for abdominal pain, blood in stool, constipation, diarrhea, nausea and vomiting. Genitourinary:  Negative for difficulty urinating, dysuria, frequency, pelvic pain, urgency and vaginal bleeding. Musculoskeletal:  Positive for arthralgias and back pain. Negative for myalgias. Skin:  Negative for rash. Neurological:  Negative for dizziness, syncope, weakness, light-headedness, numbness and headaches. Hematological:  Negative for adenopathy. Psychiatric/Behavioral:  Negative for suicidal ideas. The patient is not nervous/anxious. Current Outpatient Medications:     sertraline (ZOLOFT) 25 MG tablet, Take 1 tablet by mouth in the morning., Disp: 30 tablet, Rfl: 2    HYDROcodone-acetaminophen (NORCO) 5-325 MG per tablet, Take 1 tablet by mouth every 8 hours as needed for Pain for up to 30 days. , Disp: 90 tablet, Rfl: 0    atorvastatin (LIPITOR) 20 MG tablet, Take 1 tablet by mouth daily, Disp: 90 tablet, Rfl: 1    levothyroxine (SYNTHROID) 75 MCG tablet, Take 1 tablet by mouth Daily, Disp: 90 tablet, Rfl: 1    lisinopril (PRINIVIL;ZESTRIL) 10 MG tablet, Take 1 tablet by mouth daily, Disp: 90 tablet, Rfl: 1    omeprazole (PRILOSEC) 40 MG delayed release capsule, Take 1 capsule by mouth daily Instructed to take with sip water am of procedure, Disp: 90 capsule, Rfl: 1    sertraline (ZOLOFT) 100 MG tablet, Take 1 tablet by mouth daily, Disp: 90 tablet, Rfl: 1    terazosin (HYTRIN) 2 MG capsule, Take 1 capsule by mouth nightly, Disp: 90 capsule, Rfl: 1    verapamil (CALAN SR) 240 MG extended release tablet, Take 1 tablet by mouth daily, Disp: 90 tablet, Rfl: 1    ammonium lactate (LAC-HYDRIN) 12 % lotion, Apply topically twice daily, Disp: 400 g, Rfl: 2    amoxicillin (AMOXIL) 500 MG capsule, 4 tabs 1 hr prior to dental appt, Disp: , Rfl:     aspirin 81 MG tablet, Take 81 mg by mouth daily, Disp: , Rfl:     Multiple Vitamins-Minerals (CENTRUM SILVER PO), Take 500 mg by mouth daily, Disp: , Rfl:     ascorbic acid (VITAMIN C) 500 MG tablet, Take 1,000 mg by mouth daily. 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    Depression     GERD (gastroesophageal reflux disease)     Hyperlipemia     Hypertension     Impaired fasting glucose 5/14/2019    Postoperative hypothyroidism 5/14/2019    Presence of right artificial knee joint 5/14/2019    Thyroid disease        Past Surgical History:   Procedure Laterality Date    CHOLECYSTECTOMY      EYE SURGERY      bilat cataract    HYSTERECTOMY (CERVIX STATUS UNKNOWN)      LUMBAR LAMINECTOMY  05/29/2013    L3-L4 with microdiscectomy    FL TOTAL KNEE ARTHROPLASTY Right 5/1/2018    RIGHT KNEE TOTAL ARTHROPLASTY (CHARLES)---PRP---PNB--- performed by Rita Neely MD at [facility] OR       Family History   Problem Relation Age of Onset    Cancer Mother         stomach    Heart Attack Father     Coronary Art Dis Father        Social History     Socioeconomic History    Marital status:      Spouse name: Not on file    Number of children: Not on file    Years of education: Not on file    Highest education level: Not on file   Occupational History    Not on file   Tobacco Use    Smoking status: Never    Smokeless tobacco: Never   Substance and Sexual Activity    Alcohol use: No    Drug use:  No Sexual activity: Not on file   Other Topics Concern    Not on file   Social History Narrative    Not on file     Social Determinants of Health     Financial Resource Strain: Unknown    Difficulty of Paying Living Expenses: Patient refused   Food Insecurity: Unknown    Worried About Running Out of Food in the Last Year: Patient refused    Ran Out of Food in the Last Year: Patient refused   Transportation Needs: Not on file   Physical Activity: Not on file   Stress: Not on file   Social Connections: Not on file   Intimate Partner Violence: Not on file   Housing Stability: Not on file       Vitals:    07/27/22 0933   BP: 138/70   Site: Left Upper Arm   Position: Sitting   Cuff Size: Medium Adult   Pulse: 88   Resp: 18   Temp: 98 °F (36.7 °C)   TempSrc: Temporal   SpO2: 98%   Weight: 149 lb (67.6 kg)   Height: 5' 3\" (1.6 m)       Exam:  Physical Exam  Vitals reviewed. Constitutional:       Appearance: She is well-developed. HENT:      Head: Normocephalic and atraumatic. Right Ear: External ear normal.      Left Ear: External ear normal.   Eyes:      Pupils: Pupils are equal, round, and reactive to light. Neck:      Thyroid: No thyromegaly. Vascular: Carotid bruit present. Cardiovascular:      Rate and Rhythm: Normal rate and regular rhythm. Heart sounds: Murmur heard. Pulmonary:      Effort: Pulmonary effort is normal.      Breath sounds: Normal breath sounds. No wheezing. Abdominal:      General: Bowel sounds are normal. There is no distension. Palpations: Abdomen is soft. Tenderness: There is no abdominal tenderness. There is no guarding or rebound. Musculoskeletal:      Cervical back: Normal range of motion and neck supple. Lumbar back: Tenderness present. Decreased range of motion. Lymphadenopathy:      Cervical: No cervical adenopathy. Skin:     General: Skin is warm and dry. Neurological:      Mental Status: She is alert and oriented to person, place, and time. Cranial Nerves: No cranial nerve deficit. Psychiatric:         Judgment: Judgment normal.       Assessment and Plan:    Diagnoses and all orders for this visit:    Dyspnea, unspecified type  - referral to cardio  - uncertain if deconditioning and anxiety and back pain     Chronic bilateral low back pain without sciatica  - uncertain as to cause - most prob OA   - home exercises   - discussed physical therapy - declines   - following with pain management   - continue norco as prescribed - states has been able to decrease (9/2021)   - s/p median nerve branch block L4-5 and L5-S1, helped (9/2021)   - (12/21) - RFA of median branches of rt l4-5 and l5-s1  - stable with meds   - advised follow up with pain management - never called, referral   - consider physical therapy     Stage 3a chronic kidney disease  - uncertain etiology   - May be multifactorial - new medications, long standing HTN   - not currently following with nephrology   - (10/20) - added chlorthialidone 12.5mg  - last lab (6/2022) showed change   - held chlorthalidone (6/2022)   - refer back to nephrology (6/2022) - - has not been able to schedule   - recheck labs today 7/27/2022  - US kidney  (7/22) - Kidneys are slightly small with a small left upper pole cortical cyst.  No renal obstruction, mass or calculus    Essential hypertension  - monitor blood pressure at home  - watch diet - decreased salt. - on verapamil   - on terasozin   - on lisinopril   - stopped chlorthialidone 12.5mg (6/2022)   - poss white coat   - improved today 7/27/2022   - refer back to nephrology (6/2022) - has not been able to schedule     Stenosis of right carotid artery  - Last  carotid 12/2019 - . Less than 50% stenosis of the right and left internal carotid artery.     Mixed hyperlipidemia  - watch diet  - on atorvastatin   - follow labs (6/2022) - stable     Current mild episode of major depressive disorder, unspecified whether recurrent ([address]  - on zoloft to 100mg  - no suicidal thoughts  - states family thinks is having issues, but patient thinks is ok (12/20)   - last PHQ score of 5 (7/5/2022)   - family feels uncontrolled - will increase to 125mg daily (7/2022)     Gastroesophageal reflux disease without esophagitis  - watch diet  - on omeprazole 40mg   - stable on meds     Generalized osteoarthritis  - following with ortho  - stable    [place]AR[place] report reviewed (7/27/2022)  Controlled substance agreement updated (3/29/2021)    Controlled Substance Monitoring:    Acute and Chronic Pain Monitoring:   RX Monitoring 7/5/2022   Attestation -   Periodic Controlled Substance Monitoring Possible medication side effects, risk of tolerance/dependence & alternative treatments discussed. ;No signs of potential drug abuse or diversion identified.;Obtaining appropriate analgesic effect of treatment. 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  - s/p partial thyroidectomy  - reviewed path - Hurthle cell  - monitor labs   - increased to 75mcg (1/2020)  - labs lab (9/2021) - Stable - borderline to overactive    Postoperative hypothyroidism  - s/p partial thyroidectomy  - on synthroid  - monitor labs   - last lab (9/2021) - Stable - borderline to overactive     Impaired fasting glucose  - A1c (6/2022) - normal at 5.5  - watch diet    Dyspnea on exertion  - Uncertain etiology at present     Vitamin D deficiency  - on otc supplement   - stable     Presence of right artificial knee joint    Long term current use of therapeutic drug  - Chronic PPI therapy   - follow bone and B12     Return in about 6 weeks (around 9/7/2022) for check up and review.     Orders Placed This Encounter   Procedures    External Referral To Pain Clinic     Referral Priority:   Routine     Referral Type:   Eval and Treat     Referral Reason:   Specialty Services Required     Requested Specialty:   Pain Management     Number of Visits Requested:   Melany Barnett Joleen Dey DO, Cardiology, [place]     Referral Priority:   Routine     Referral Type:   Eval and Treat     Referral Reason:   Specialty Services Required     Referred to Provider:   Oscar Peralta DO     Requested Specialty:   Cardiology     Number of Visits Requested:   1       Requested Prescriptions     Signed Prescriptions Disp Refills    sertraline (ZOLOFT) 25 MG tablet 30 tablet 2     Sig: Take 1 tablet by mouth in the morning.      Janice Tapia MD  7/27/2022  10:50 AM

## 2022-07-29 ENCOUNTER — TELEPHONE (OUTPATIENT)
Dept: PRIMARY CARE CLINIC | Age: 87
End: 2022-07-29

## 2022-09-06 ENCOUNTER — OFFICE VISIT (OUTPATIENT)
Dept: FAMILY MEDICINE CLINIC | Age: 87
End: 2022-09-06
Payer: MEDICARE

## 2022-09-06 VITALS
BODY MASS INDEX: 27.46 KG/M2 | WEIGHT: 155 LBS | RESPIRATION RATE: 22 BRPM | TEMPERATURE: 98.6 F | SYSTOLIC BLOOD PRESSURE: 138 MMHG | OXYGEN SATURATION: 97 % | HEART RATE: 77 BPM | HEIGHT: 63 IN | DIASTOLIC BLOOD PRESSURE: 60 MMHG

## 2022-09-06 DIAGNOSIS — E89.0 POSTOPERATIVE HYPOTHYROIDISM: ICD-10-CM

## 2022-09-06 DIAGNOSIS — M54.50 CHRONIC BILATERAL LOW BACK PAIN WITHOUT SCIATICA: ICD-10-CM

## 2022-09-06 DIAGNOSIS — N39.3 STRESS INCONTINENCE OF URINE: ICD-10-CM

## 2022-09-06 DIAGNOSIS — K21.9 GASTROESOPHAGEAL REFLUX DISEASE WITHOUT ESOPHAGITIS: Chronic | ICD-10-CM

## 2022-09-06 DIAGNOSIS — G89.29 CHRONIC BILATERAL LOW BACK PAIN WITHOUT SCIATICA: ICD-10-CM

## 2022-09-06 DIAGNOSIS — E78.2 MIXED HYPERLIPIDEMIA: Chronic | ICD-10-CM

## 2022-09-06 DIAGNOSIS — I10 ESSENTIAL HYPERTENSION: ICD-10-CM

## 2022-09-06 DIAGNOSIS — F32.0 CURRENT MILD EPISODE OF MAJOR DEPRESSIVE DISORDER, UNSPECIFIED WHETHER RECURRENT (HCC): Chronic | ICD-10-CM

## 2022-09-06 DIAGNOSIS — R06.00 DYSPNEA, UNSPECIFIED TYPE: Primary | ICD-10-CM

## 2022-09-06 DIAGNOSIS — N18.32 STAGE 3B CHRONIC KIDNEY DISEASE (HCC): ICD-10-CM

## 2022-09-06 DIAGNOSIS — M15.9 GENERALIZED OSTEOARTHRITIS: ICD-10-CM

## 2022-09-06 DIAGNOSIS — R73.01 IMPAIRED FASTING GLUCOSE: ICD-10-CM

## 2022-09-06 PROCEDURE — 1123F ACP DISCUSS/DSCN MKR DOCD: CPT | Performed by: INTERNAL MEDICINE

## 2022-09-06 PROCEDURE — 99213 OFFICE O/P EST LOW 20 MIN: CPT | Performed by: INTERNAL MEDICINE

## 2022-09-06 RX ORDER — LEVOTHYROXINE SODIUM 0.07 MG/1
75 TABLET ORAL DAILY
Qty: 90 TABLET | Refills: 3 | Status: SHIPPED | OUTPATIENT
Start: 2022-09-06

## 2022-09-06 RX ORDER — TERAZOSIN 2 MG/1
2 CAPSULE ORAL NIGHTLY
Qty: 90 CAPSULE | Refills: 3 | Status: SHIPPED | OUTPATIENT
Start: 2022-09-06

## 2022-09-06 RX ORDER — OMEPRAZOLE 40 MG/1
40 CAPSULE, DELAYED RELEASE ORAL DAILY
Qty: 90 CAPSULE | Refills: 3 | Status: SHIPPED | OUTPATIENT
Start: 2022-09-06

## 2022-09-06 RX ORDER — SERTRALINE HYDROCHLORIDE 25 MG/1
25 TABLET, FILM COATED ORAL DAILY
Qty: 90 TABLET | Refills: 3 | Status: SHIPPED | OUTPATIENT
Start: 2022-09-06

## 2022-09-06 RX ORDER — ATORVASTATIN CALCIUM 20 MG/1
20 TABLET, FILM COATED ORAL DAILY
Qty: 90 TABLET | Refills: 3 | Status: SHIPPED | OUTPATIENT
Start: 2022-09-06

## 2022-09-06 RX ORDER — SERTRALINE HYDROCHLORIDE 100 MG/1
100 TABLET, FILM COATED ORAL DAILY
Qty: 90 TABLET | Refills: 3 | Status: SHIPPED | OUTPATIENT
Start: 2022-09-06

## 2022-09-06 RX ORDER — HYDROCODONE BITARTRATE AND ACETAMINOPHEN 5; 325 MG/1; MG/1
1 TABLET ORAL EVERY 8 HOURS PRN
Qty: 90 TABLET | Refills: 0 | Status: SHIPPED | OUTPATIENT
Start: 2022-09-06 | End: 2022-10-06

## 2022-09-06 RX ORDER — LISINOPRIL 10 MG/1
10 TABLET ORAL DAILY
Qty: 90 TABLET | Refills: 3 | Status: SHIPPED | OUTPATIENT
Start: 2022-09-06

## 2022-09-06 RX ORDER — VERAPAMIL HYDROCHLORIDE 240 MG/1
240 TABLET, FILM COATED, EXTENDED RELEASE ORAL DAILY
Qty: 90 TABLET | Refills: 3 | Status: SHIPPED | OUTPATIENT
Start: 2022-09-06

## 2022-09-06 ASSESSMENT — ENCOUNTER SYMPTOMS
CONSTIPATION: 0
NAUSEA: 0
DIARRHEA: 0
RHINORRHEA: 0
ABDOMINAL PAIN: 0
CHEST TIGHTNESS: 0
BLOOD IN STOOL: 0
SHORTNESS OF BREATH: 0
VOMITING: 0
BACK PAIN: 1
COUGH: 0
SORE THROAT: 0
EYE PAIN: 0

## 2022-09-06 NOTE — PROGRESS NOTES
counseling. 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 No side effects reported. Administration instructions reviewed. Last Lab (9/2021) - stable. - States has urinary incontinence - stable with meds (terazosin). States urinary frequency. States stable. - States has GERD, on omeprazole - Stable with omeprazole dose. - States has had right total knee arthoplasty for arthritis. Still having right Knee pain. States also has some back pain. States also right wrist pain. States following with ortho (Dr. Shyann Graham). States had compression stockings. States also using heating pad to back. States needs refill of meds. Takes Norco mostly at night - helping. No reported side effects or complications reported. - states having left foot pain. States Has seen podiatry. Has bunion and hammer toes. Recommend pad. Still with discomfort.     - States has chronic rhinitis. Stable. States no cough. States nasal congestion. No sore throat. No chest pain, No fever or chills. No nausea or vomiting.     - Still reports pain to the left shoulder. The patient states the pain has been present chronically but pain has worsened recently. States located in the shoulder and pain in upper arm. No swelling. No erythema. Had seen ortho. Was given an injection.       - last lab (8/1/2022) reviewed with patient 9/6/2022    Health Maintenance   - immunizations:   Influenza Vaccination - (9/21/2016) , (10/2017), (10/2018), (2019), (9/15/20), (2021)  Pneumonia Vaccination - (12/2015) - prevnar, (12/2016) - pneumonoccal  Zoster/Shingles Vaccine  Tetanus Vaccination - (12/15/2015), (1/1/2020) - tdap   covid (1/20/2021) #1, (2/17/2021) #2, (10/2021) #3  (04/07/2022) #4 - moderna    - Screenings:   Bone Density Scan - (2/28/2019)   Pelvic/Pap Exam  Mammogram - (8/14/2014), (4/2019) - neg     Colonoscopy - (2011) - neg per patient    Couseled on Home Safety - (11/28/2017)    Carotid Artery Study - (3/2016) - <50% b/l, also showed thyroid nodules,  (12/2019) - < 50% b/l     ROS:  Review of Systems   Constitutional:  Negative for appetite change, chills, fever and unexpected weight change. HENT:  Negative for congestion, rhinorrhea and sore throat. Eyes:  Negative for pain and visual disturbance. Respiratory:  Negative for cough, chest tightness and shortness of breath. Cardiovascular:  Negative for chest pain and palpitations. Gastrointestinal:  Negative for abdominal pain, blood in stool, constipation, diarrhea, nausea and vomiting. Genitourinary:  Negative for difficulty urinating, dysuria, frequency, pelvic pain, urgency and vaginal bleeding. Musculoskeletal:  Positive for arthralgias and back pain. Negative for myalgias. Skin:  Negative for rash. Neurological:  Negative for dizziness, syncope, weakness, light-headedness, numbness and headaches. Hematological:  Negative for adenopathy. Psychiatric/Behavioral:  Negative for suicidal ideas. The patient is not nervous/anxious.         Current Outpatient Medications:     atorvastatin (LIPITOR) 20 MG tablet, Take 1 tablet by mouth daily, Disp: 90 tablet, Rfl: 3    levothyroxine (SYNTHROID) 75 MCG tablet, Take 1 tablet by mouth Daily, Disp: 90 tablet, Rfl: 3    lisinopril (PRINIVIL;ZESTRIL) 10 MG tablet, Take 1 tablet by mouth daily, Disp: 90 tablet, Rfl: 3    omeprazole (PRILOSEC) 40 MG delayed release capsule, Take 1 capsule by mouth daily Instructed to take with sip water am of procedure, Disp: 90 capsule, Rfl: 3    sertraline (ZOLOFT) 100 MG tablet, Take 1 tablet by mouth daily, Disp: 90 tablet, Rfl: 3    sertraline (ZOLOFT) 25 MG tablet, Take 1 tablet by mouth daily, Disp: 90 tablet, Rfl: 3    verapamil (CALAN SR) 240 MG extended release tablet, Take 1 tablet by mouth daily, Disp: 90 tablet, Rfl: 3    terazosin (HYTRIN) 2 MG capsule, Take 1 capsule by mouth nightly, Disp: 90 capsule, Rfl: 3 HYDROcodone-acetaminophen (NORCO) 5-325 MG per tablet, Take 1 tablet by mouth every 8 hours as needed for Pain for up to 30 days. , Disp: 90 tablet, Rfl: 0    ammonium lactate (LAC-HYDRIN) 12 % lotion, Apply topically twice daily, Disp: 400 g, Rfl: 2    amoxicillin (AMOXIL) 500 MG capsule, 4 tabs 1 hr prior to dental appt, Disp: , Rfl:     aspirin 81 MG tablet, Take 81 mg by mouth daily, Disp: , Rfl:     Multiple Vitamins-Minerals (CENTRUM SILVER PO), Take 500 mg by mouth daily, Disp: , Rfl:     ascorbic acid (VITAMIN C) 500 MG tablet, Take 1,000 mg by mouth daily. 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    Depression     GERD (gastroesophageal reflux disease)     Hyperlipemia     Hypertension     Impaired fasting glucose 5/14/2019    Postoperative hypothyroidism 5/14/2019    Presence of right artificial knee joint 5/14/2019    Thyroid disease        Past Surgical History:   Procedure Laterality Date    CHOLECYSTECTOMY      EYE SURGERY      bilat cataract    HYSTERECTOMY (CERVIX STATUS UNKNOWN)      LUMBAR LAMINECTOMY  05/29/2013    L3-L4 with microdiscectomy    DC TOTAL KNEE ARTHROPLASTY Right 5/1/2018    RIGHT KNEE TOTAL ARTHROPLASTY (CHARLES)---PRP---PNB--- performed by Maxim Chi MD at [facility] OR       Family History   Problem Relation Age of Onset    Cancer Mother         stomach    Heart Attack Father     Coronary Art Dis Father        Social History     Socioeconomic History    Marital status:       Spouse name: Not on file    Number of children: Not on file    Years of education: Not on file    Highest education level: Not on file   Occupational History    Not on file   Tobacco Use    Smoking status: Never    Smokeless tobacco: Never   Substance and Sexual Activity Alcohol use: No    Drug use: No    Sexual activity: Not on file   Other Topics Concern    Not on file   Social History Narrative    Not on file     Social Determinants of Health     Financial Resource Strain: Unknown    Difficulty of Paying Living Expenses: Patient refused   Food Insecurity: Unknown    Worried About Running Out of Food in the Last Year: Patient refused    Ran Out of Food in the Last Year: Patient refused   Transportation Needs: Not on file   Physical Activity: Not on file   Stress: Not on file   Social Connections: Not on file   Intimate Partner Violence: Not on file   Housing Stability: Not on file       Vitals:    09/06/22 1103   BP: 138/60   Site: Left Upper Arm   Position: Sitting   Cuff Size: Large Adult   Pulse: 77   Resp: 22   Temp: 98.6 °F (37 °C)   TempSrc: Temporal   SpO2: 97%   Weight: 155 lb (70.3 kg)   Height: 5' 3\" (1.6 m)       Exam:  Physical Exam  Vitals reviewed. Constitutional:       Appearance: She is well-developed. HENT:      Head: Normocephalic and atraumatic. Right Ear: External ear normal.      Left Ear: External ear normal.   Eyes:      Pupils: Pupils are equal, round, and reactive to light. Neck:      Thyroid: No thyromegaly. Vascular: Carotid bruit present. Cardiovascular:      Rate and Rhythm: Normal rate and regular rhythm. Heart sounds: Murmur heard. Pulmonary:      Effort: Pulmonary effort is normal.      Breath sounds: Normal breath sounds. No wheezing. Abdominal:      General: Bowel sounds are normal. There is no distension. Palpations: Abdomen is soft. Tenderness: There is no abdominal tenderness. There is no guarding or rebound. Musculoskeletal:      Cervical back: Normal range of motion and neck supple. Lumbar back: Tenderness present. Decreased range of motion. Lymphadenopathy:      Cervical: No cervical adenopathy. Skin:     General: Skin is warm and dry.    Neurological:      Mental Status: She is alert and oriented to person, place, and time. Cranial Nerves: No cranial nerve deficit. Psychiatric:         Judgment: Judgment normal.       Assessment and Plan:    Diagnoses and all orders for this visit:    Dyspnea, unspecified type  - referral to cardio - appointment pending   - uncertain if deconditioning and anxiety and back pain     Chronic bilateral low back pain without sciatica  - uncertain as to cause - most prob OA   - home exercises   - discussed physical therapy - declines   - following with pain management   - continue norco as prescribed   - s/p median nerve branch block L4-5 and L5-S1, helped (9/2021)   - (12/21) - RFA of median branches of rt l4-5 and l5-s1  - states had epidural (8/2022) - no records available. - consider physical therapy     Stage 3a chronic kidney disease  - uncertain etiology   - May be multifactorial - new medications, long standing HTN   - following with nephrology   - last lab (6/2022) showed change   - off chlorthalidone (6/2022)   - last labs today (8/2022) - stabe   - US kidney  (7/22) - Kidneys are slightly small with a small left upper pole cortical cyst.  No renal obstruction, mass or calculus    Essential hypertension  - monitor blood pressure at home  - watch diet - decreased salt. - on verapamil   - on terasozin   - on lisinopril   - stopped chlorthialidone 12.5mg (6/2022)   - poss white coat   - improved today 9/6/2022   - refer back to nephrology (6/2022) - has not been able to schedule     Stenosis of right carotid artery  - Last US carotid 12/2019 - . Less than 50% stenosis of the right and left internal carotid artery.     Mixed hyperlipidemia  - watch diet  - on atorvastatin   - follow labs (6/2022) - stable     Current mild episode of major depressive disorder, unspecified whether recurrent ([address]  - on zoloft to 100mg  - no suicidal thoughts  - states family thinks is having issues, but patient thinks is ok (12/20)   - last PHQ score of 5 (7/5/2022)   - family feels uncontrolled - will increase to 125mg daily (7/2022)     Gastroesophageal reflux disease without esophagitis  - watch diet  - on omeprazole 40mg   - stable on meds     Generalized osteoarthritis  - following with ortho  - stable    OARRS report reviewed (9/6/2022)  Controlled substance agreement updated (3/29/2021)    Controlled Substance Monitoring:    Acute and Chronic Pain Monitoring:   RX Monitoring 9/6/2022   Attestation -   Periodic Controlled Substance Monitoring Possible medication side effects, risk of tolerance/dependence & alternative treatments discussed. ;No signs of potential drug abuse or diversion identified.;Obtaining appropriate analgesic effect of treatment. 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  - s/p partial thyroidectomy  - reviewed path - Hurthle cell  - monitor labs   - increased to 75mcg (1/2020)  - labs lab (9/2021) - Stable - borderline to overactive    Postoperative hypothyroidism  - s/p partial thyroidectomy  - on synthroid  - monitor labs   - last lab (9/2021) - Stable - borderline to overactive     Impaired fasting glucose  - watch diet  - follow A1c 5.8 (8/022)     Vitamin D deficiency  - on otc supplement   - stable     Presence of right artificial knee joint    Long term current use of therapeutic drug  - Chronic PPI therapy   - follow bone and B12     Return in about 3 months (around 12/6/2022) for check up and review. No orders of the defined types were placed in this encounter.     Requested Prescriptions     Signed Prescriptions Disp Refills    atorvastatin (LIPITOR) 20 MG tablet 90 tablet 3     Sig: Take 1 tablet by mouth daily    levothyroxine (SYNTHROID) 75 MCG tablet 90 tablet 3     Sig: Take 1 tablet by mouth Daily    lisinopril (PRINIVIL;ZESTRIL) 10 MG tablet 90 tablet 3     Sig: Take 1 tablet by mouth daily    omeprazole (PRILOSEC) 40 MG delayed release capsule 90 capsule 3 Sig: Take 1 capsule by mouth daily Instructed to take with sip water am of procedure    sertraline (ZOLOFT) 100 MG tablet 90 tablet 3     Sig: Take 1 tablet by mouth daily    sertraline (ZOLOFT) 25 MG tablet 90 tablet 3     Sig: Take 1 tablet by mouth daily    verapamil (CALAN SR) 240 MG extended release tablet 90 tablet 3     Sig: Take 1 tablet by mouth daily    terazosin (HYTRIN) 2 MG capsule 90 capsule 3     Sig: Take 1 capsule by mouth nightly    HYDROcodone-acetaminophen (NORCO) 5-325 MG per tablet 90 tablet 0     Sig: Take 1 tablet by mouth every 8 hours as needed for Pain for up to 30 days.      Gretta Liao MD  9/6/2022  11:45 AM

## 2022-09-28 ENCOUNTER — OFFICE VISIT (OUTPATIENT)
Dept: CARDIOLOGY CLINIC | Age: 87
End: 2022-09-28
Payer: MEDICARE

## 2022-09-28 VITALS
DIASTOLIC BLOOD PRESSURE: 70 MMHG | RESPIRATION RATE: 16 BRPM | HEART RATE: 73 BPM | SYSTOLIC BLOOD PRESSURE: 132 MMHG | OXYGEN SATURATION: 98 % | BODY MASS INDEX: 27.46 KG/M2 | WEIGHT: 155 LBS | HEIGHT: 63 IN

## 2022-09-28 DIAGNOSIS — R06.02 SOB (SHORTNESS OF BREATH): ICD-10-CM

## 2022-09-28 DIAGNOSIS — I10 ESSENTIAL HYPERTENSION: Primary | ICD-10-CM

## 2022-09-28 PROCEDURE — 99214 OFFICE O/P EST MOD 30 MIN: CPT | Performed by: INTERNAL MEDICINE

## 2022-09-28 PROCEDURE — 1123F ACP DISCUSS/DSCN MKR DOCD: CPT | Performed by: INTERNAL MEDICINE

## 2022-09-28 PROCEDURE — 93000 ELECTROCARDIOGRAM COMPLETE: CPT | Performed by: INTERNAL MEDICINE

## 2022-09-28 NOTE — PROGRESS NOTES
Take 1 tablet by mouth daily 90 tablet 3    terazosin (HYTRIN) 2 MG capsule Take 1 capsule by mouth nightly 90 capsule 3    HYDROcodone-acetaminophen (NORCO) 5-325 MG per tablet Take 1 tablet by mouth every 8 hours as needed for Pain for up to 30 days. 90 tablet 0    ammonium lactate (LAC-HYDRIN) 12 % lotion Apply topically twice daily 400 g 2    amoxicillin (AMOXIL) 500 MG capsule 4 tabs 1 hr prior to dental appt      aspirin 81 MG tablet Take 81 mg by mouth daily      Multiple Vitamins-Minerals (CENTRUM SILVER PO) Take 500 mg by mouth daily      ascorbic acid (VITAMIN C) 500 MG tablet Take 1,000 mg by mouth daily. Cholecalciferol (VITAMIN D3) 1000 units TABS Take 1,000 Units by mouth daily        No current facility-administered medications for this visit. Social History     Socioeconomic History    Marital status:       Spouse name: Not on file    Number of children: Not on file    Years of education: Not on file    Highest education level: Not on file   Occupational History    Not on file   Tobacco Use    Smoking status: Never    Smokeless tobacco: Never   Vaping Use    Vaping Use: Never used   Substance and Sexual Activity    Alcohol use: No    Drug use: No    Sexual activity: Not on file   Other Topics Concern    Not on file   Social History Narrative    Not on file     Social Determinants of Health     Financial Resource Strain: Unknown    Difficulty of Paying Living Expenses: Patient refused   Food Insecurity: Unknown    Worried About Running Out of Food in the Last Year: Patient refused    Ran Out of Food in the Last Year: Patient refused   Transportation Needs: Not on file   Physical Activity: Not on file   Stress: Not on file   Social Connections: Not on file   Intimate Partner Violence: Not on file   Housing Stability: Not on file       Family History   Problem Relation Age of Onset    Cancer Mother         stomach    Heart Attack Father     Coronary Art Dis Father      Review of Systems:  Heart: as above   Lungs: as above   Eyes: denies changes in vision or discharge. Ears: denies changes in hearing or pain. Nose: denies epistaxis or masses   Throat: denies sore throat or trouble swallowing. Neuro: denies numbness, tingling, tremors. Skin: denies rashes or itching. : denies hematuria, dysuria   GI: denies vomiting, diarrhea   Psych: denies mood changed, anxiety, depression. Physical Exam   /70   Pulse 73   Resp 16   Ht 5' 3\" (1.6 m)   Wt 155 lb (70.3 kg)   SpO2 98%   BMI 27.46 kg/m²   Constitutional: Oriented to person, place, and time. Well-developed and well-nourished. No distress. Head: Normocephalic and atraumatic. Eyes: EOM are normal. Pupils are equal, round, and reactive to light. Neck: Normal range of motion. Neck supple. No hepatojugular reflux and no JVD present. Carotid bruit is not present. No tracheal deviation present. No thyromegaly present. Cardiovascular: Normal rate, regular rhythm, normal heart sounds and intact distal pulses. Exam reveals no gallop and no friction rub. No murmur heard. Pulmonary/Chest: Effort normal and breath sounds normal. No respiratory distress. No wheezes. No rales. No tenderness. Abdominal: Soft. Bowel sounds are normal. No distension and no mass. No tenderness. No rebound and no guarding. Musculoskeletal: Normal range of motion. No edema and no tenderness. Lymphadenopathy:   No cervical adenopathy. No groin adenopathy. Neurological: Alert and oriented to person, place, and time. Skin: Skin is warm and dry. No rash noted. Not diaphoretic. No erythema. Psychiatric: Normal mood and affect. Behavior is normal.     EKG:  normal sinus rhythm. Echo Summary 7/19/2021:   Ejection fraction is visually estimated at 60%. No regional wall motion abnormalities seen. Normal right ventricle structure and function. Physiologic and/or trace mitral regurgitation is present.      ASSESSMENT AND PLAN:  Patient Active Problem List   Diagnosis    Essential hypertension    Hyperlipidemia    GERD (gastroesophageal reflux disease)    Depression    Right lumbar radiculopathy    Abnormal EKG    Arthritis of right knee    Status post right knee replacement    Generalized osteoarthritis    Urinary incontinence    Hurthle cell tumor    Impaired fasting glucose    Vitamin D deficiency    Presence of right artificial knee joint    Long term current use of therapeutic drug    Postoperative hypothyroidism    Dyspnea on exertion    Right foot pain    Stenosis of right carotid artery    Right wrist pain    Acute kidney insufficiency     1. SHERWOOD:     Echo normal 7/2021. Stress. 2. HTN: Observe. 3. Lipids    4. DJD: Under evaluation. Bushra Ledesma D.O.   Cardiologist  Cardiology, [facility]

## 2022-10-03 ENCOUNTER — TELEPHONE (OUTPATIENT)
Dept: CARDIOLOGY | Age: 87
End: 2022-10-03

## 2022-10-12 ENCOUNTER — TELEPHONE (OUTPATIENT)
Dept: CARDIOLOGY | Age: 87
End: 2022-10-12

## 2022-10-12 NOTE — TELEPHONE ENCOUNTER
Spoke with patient and confirmed pharmacological stress test appointment on October 14, 2022 at 0930. Instructions for test, and COVID-19 preprocedure information reviewed with patient, questions answered. Patient verbalized understanding. Also instructed to call office if unable to keep appointment.

## 2022-10-14 ENCOUNTER — HOSPITAL ENCOUNTER (OUTPATIENT)
Dept: CARDIOLOGY | Age: 87
Discharge: HOME | End: 2022-10-14
Payer: MEDICARE

## 2022-10-14 VITALS
HEIGHT: 63 IN | HEART RATE: 63 BPM | OXYGEN SATURATION: 98 % | BODY MASS INDEX: 27.46 KG/M2 | DIASTOLIC BLOOD PRESSURE: 80 MMHG | SYSTOLIC BLOOD PRESSURE: 170 MMHG | WEIGHT: 155 LBS

## 2022-10-14 DIAGNOSIS — R06.02 SOB (SHORTNESS OF BREATH): Primary | ICD-10-CM

## 2022-10-14 DIAGNOSIS — I10 ESSENTIAL HYPERTENSION: ICD-10-CM

## 2022-10-14 DIAGNOSIS — R06.02 SOB (SHORTNESS OF BREATH): ICD-10-CM

## 2022-10-14 PROCEDURE — 78452 HT MUSCLE IMAGE SPECT MULT: CPT

## 2022-10-14 PROCEDURE — 6360000002 HC RX W HCPCS: Performed by: INTERNAL MEDICINE

## 2022-10-14 PROCEDURE — 2580000003 HC RX 258: Performed by: INTERNAL MEDICINE

## 2022-10-14 PROCEDURE — 93017 CV STRESS TEST TRACING ONLY: CPT

## 2022-10-14 PROCEDURE — A9500 TC99M SESTAMIBI: HCPCS | Performed by: INTERNAL MEDICINE

## 2022-10-14 PROCEDURE — 3430000000 HC RX DIAGNOSTIC RADIOPHARMACEUTICAL: Performed by: INTERNAL MEDICINE

## 2022-10-14 RX ORDER — SODIUM CHLORIDE 0.9 % (FLUSH) 0.9 %
10 SYRINGE (ML) INJECTION PRN
Status: DISCONTINUED | OUTPATIENT
Start: 2022-10-14 | End: 2022-10-15 | Stop reason: HOSPADM

## 2022-10-14 RX ORDER — CHOLECALCIFEROL (VITAMIN D3) 125 MCG
500 CAPSULE ORAL DAILY
COMMUNITY

## 2022-10-14 RX ADMIN — Medication 30 MILLICURIE: at 12:10

## 2022-10-14 RX ADMIN — SODIUM CHLORIDE, PRESERVATIVE FREE 10 ML: 5 INJECTION INTRAVENOUS at 09:37

## 2022-10-14 RX ADMIN — REGADENOSON 0.4 MG: 0.08 INJECTION, SOLUTION INTRAVENOUS at 12:10

## 2022-10-14 RX ADMIN — Medication 9.7 MILLICURIE: at 09:37

## 2022-10-14 RX ADMIN — SODIUM CHLORIDE, PRESERVATIVE FREE 10 ML: 5 INJECTION INTRAVENOUS at 12:10

## 2022-10-14 RX ADMIN — SODIUM CHLORIDE, PRESERVATIVE FREE 10 ML: 5 INJECTION INTRAVENOUS at 12:11

## 2022-10-14 NOTE — PROCEDURES
[address] [facility] [address] [address] [address]  [phone]                Pharmacologic Stress Nuclear Gated SPECT Study    Name: [facility] & [address] Account Number: [de-identified]    :  1933          Sex: female         Date of Study:  10/14/2022    Height: 5' 3\" (160 cm)         Weight: 155 lb (70.3 kg)     Ordering Provider: Yohana Aquino          PCP: Sang Rayo MD      Cardiologist: Yohana Aquino             Interpreting Physician: Brooke Jain MD  _________________________________________________________________________________    Indication:   Detecting the presence and location of coronary artery disease    Clinical History:   Patient has no known history of coronary artery disease. Resting ECG:    Normal sinus rhythm, occasional premature atrial complexes, abnormal EKG. Procedure:   Pharmacologic stress testing was performed with regadenoson 0.4 mg for 15 seconds. Additionally, low-level exercise was performed along with the infusion. The heart rate was 63 at baseline and demetrius to 103 beats during the infusion. This corresponds to 79% of maximum predicted heart rate. The blood pressure at baseline was 180/70 and blood pressure at the end of infusion was 158/70. Blood pressure response was normal during the stress procedure. The patient experienced shortness of breath post lexiscan during the infusion. resolving in recovery    ECG during the infusion did not change. IMAGING: Myocardial perfusion imaging was performed at rest 30-35 minutes following the intravenous injection of 9.7 mCi of (Tc-Sestamibi) followed by 10 ml of Normal Saline. As per infusion protocol, the patient was injected intravenously with 30 mCi of (Tc-Sestamibi) followed by 10 ml of Normal Saline. Gated post-stress tomographic imaging was performed 20-25 minutes after stress. FINDINGS: The overall quality of the study was good. Left ventricular cavity size was noted to be normal.    Rotational analog analysis demonstrated no patient motion or abnormal extracardiac radioactivity. There is soft tissue diaphragmatic attenuation artifact and also increased liver radiotracer activity causing ramp-filter artifact. The gated SPECT stress imaging in the short, vertical long, and horizontal long axis demonstrated normal homogeneous tracer distribution throughout the myocardium. The resting images show no change. Gated SPECT left ventricular ejection fraction was calculated to be 74%, with normal myocardial thickening and wall motion. TID ratio 0.85. Impression:    ECG during the infusion did not change. The myocardial perfusion imaging was normal with attenuation artifact. Overall left ventricular systolic function was normal without regional wall motion abnormalities. No transient ischemic dilatation. Low risk general pharmacologic stress test.    Thank you for sending your patient to this [employer] [facility].      Electronically signed by Wesley Wilson MD on 10/14/22 at 2:58 PM EDT

## 2022-10-14 NOTE — DISCHARGE INSTRUCTIONS
[address] [facility] Lab      Instructions to Patients    The following are the instructions for patients who have had a procedure in our office today. Patient name: Fam Biggs    Radionuclide Activity: 40mCi of 99mTc-Sestamibi    Date Administered: 10/14/2022    Expires: 48 hours after scheduled appointment time      Patient may resume normal activity unless otherwise instructed. Patient may resume medications as normal.  If the need should arise, patient may call [phone] between the hours of 8:00am-5:00pm.  After hours there is at least one physician on-call at all times for those patients needing assistance. Patients may call [phone] and the answering service will direct the patient to one of our physicians for assistance. After the patient's test if they are going to be leaving from an airport in the near future they should take this letter with them to verify the test and radionuclide used for their test.      This letter verifies that the above named bearer received an injection of a radionuclide for medical purpose/usage only.         Electronically signed by Patt Pelayo on 10/14/2022 at 11:47 AM

## 2022-10-21 ENCOUNTER — TELEPHONE (OUTPATIENT)
Dept: FAMILY MEDICINE CLINIC | Age: 87
End: 2022-10-21

## 2022-10-21 NOTE — TELEPHONE ENCOUNTER
Overall the stress test looked good with no concerning features and overall is considered a low risk stress test

## 2022-10-21 NOTE — TELEPHONE ENCOUNTER
----- Message from Avila See sent at 10/21/2022 12:30 PM EDT -----  Subject: Message to Provider    QUESTIONS  Information for Provider? Pt need a call back if got Stress Test results. ---------------------------------------------------------------------------  --------------  [facility]  [phone]; OK to leave message on voicemail  ---------------------------------------------------------------------------  --------------  SCRIPT ANSWERS  Relationship to Patient?  Self

## 2022-10-25 ENCOUNTER — TELEPHONE (OUTPATIENT)
Dept: CARDIOLOGY CLINIC | Age: 87
End: 2022-10-25

## 2022-12-06 ENCOUNTER — OFFICE VISIT (OUTPATIENT)
Dept: FAMILY MEDICINE CLINIC | Age: 87
End: 2022-12-06
Payer: MEDICARE

## 2022-12-06 VITALS
HEART RATE: 82 BPM | RESPIRATION RATE: 18 BRPM | HEIGHT: 63 IN | SYSTOLIC BLOOD PRESSURE: 170 MMHG | BODY MASS INDEX: 27.46 KG/M2 | OXYGEN SATURATION: 96 % | DIASTOLIC BLOOD PRESSURE: 90 MMHG | TEMPERATURE: 97.5 F | WEIGHT: 155 LBS

## 2022-12-06 VITALS
TEMPERATURE: 97.5 F | SYSTOLIC BLOOD PRESSURE: 160 MMHG | DIASTOLIC BLOOD PRESSURE: 70 MMHG | RESPIRATION RATE: 18 BRPM | HEART RATE: 82 BPM | HEIGHT: 63 IN | BODY MASS INDEX: 27.46 KG/M2 | OXYGEN SATURATION: 96 % | WEIGHT: 155 LBS

## 2022-12-06 DIAGNOSIS — E78.2 MIXED HYPERLIPIDEMIA: ICD-10-CM

## 2022-12-06 DIAGNOSIS — G89.29 CHRONIC BILATERAL LOW BACK PAIN WITHOUT SCIATICA: ICD-10-CM

## 2022-12-06 DIAGNOSIS — F32.0 CURRENT MILD EPISODE OF MAJOR DEPRESSIVE DISORDER, UNSPECIFIED WHETHER RECURRENT (HCC): ICD-10-CM

## 2022-12-06 DIAGNOSIS — R73.01 IMPAIRED FASTING GLUCOSE: ICD-10-CM

## 2022-12-06 DIAGNOSIS — M15.9 GENERALIZED OSTEOARTHRITIS: Primary | ICD-10-CM

## 2022-12-06 DIAGNOSIS — K21.9 GASTROESOPHAGEAL REFLUX DISEASE WITHOUT ESOPHAGITIS: ICD-10-CM

## 2022-12-06 DIAGNOSIS — M54.50 CHRONIC BILATERAL LOW BACK PAIN WITHOUT SCIATICA: ICD-10-CM

## 2022-12-06 DIAGNOSIS — Z79.899 LONG TERM CURRENT USE OF THERAPEUTIC DRUG: ICD-10-CM

## 2022-12-06 DIAGNOSIS — N18.32 STAGE 3B CHRONIC KIDNEY DISEASE (HCC): ICD-10-CM

## 2022-12-06 DIAGNOSIS — I65.21 STENOSIS OF RIGHT CAROTID ARTERY: ICD-10-CM

## 2022-12-06 DIAGNOSIS — N39.3 STRESS INCONTINENCE OF URINE: ICD-10-CM

## 2022-12-06 DIAGNOSIS — Z00.00 MEDICARE ANNUAL WELLNESS VISIT, SUBSEQUENT: Primary | ICD-10-CM

## 2022-12-06 DIAGNOSIS — E89.0 POSTOPERATIVE HYPOTHYROIDISM: ICD-10-CM

## 2022-12-06 DIAGNOSIS — I10 ESSENTIAL HYPERTENSION: ICD-10-CM

## 2022-12-06 PROCEDURE — G0439 PPPS, SUBSEQ VISIT: HCPCS | Performed by: INTERNAL MEDICINE

## 2022-12-06 PROCEDURE — 1123F ACP DISCUSS/DSCN MKR DOCD: CPT | Performed by: INTERNAL MEDICINE

## 2022-12-06 PROCEDURE — 99214 OFFICE O/P EST MOD 30 MIN: CPT | Performed by: INTERNAL MEDICINE

## 2022-12-06 RX ORDER — HYDROCODONE BITARTRATE AND ACETAMINOPHEN 5; 325 MG/1; MG/1
1 TABLET ORAL EVERY 8 HOURS PRN
Qty: 90 TABLET | Refills: 0 | Status: SHIPPED | OUTPATIENT
Start: 2022-12-06 | End: 2023-01-05

## 2022-12-06 ASSESSMENT — PATIENT HEALTH QUESTIONNAIRE - PHQ9
2. FEELING DOWN, DEPRESSED OR HOPELESS: 2
7. TROUBLE CONCENTRATING ON THINGS, SUCH AS READING THE NEWSPAPER OR WATCHING TELEVISION: 0
SUM OF ALL RESPONSES TO PHQ QUESTIONS 1-9: 4
3. TROUBLE FALLING OR STAYING ASLEEP: 1
10. IF YOU CHECKED OFF ANY PROBLEMS, HOW DIFFICULT HAVE THESE PROBLEMS MADE IT FOR YOU TO DO YOUR WORK, TAKE CARE OF THINGS AT HOME, OR GET ALONG WITH OTHER PEOPLE: 0
SUM OF ALL RESPONSES TO PHQ9 QUESTIONS 1 & 2: 2
4. FEELING TIRED OR HAVING LITTLE ENERGY: 1
SUM OF ALL RESPONSES TO PHQ QUESTIONS 1-9: 4
5. POOR APPETITE OR OVEREATING: 0
9. THOUGHTS THAT YOU WOULD BE BETTER OFF DEAD, OR OF HURTING YOURSELF: 0
8. MOVING OR SPEAKING SO SLOWLY THAT OTHER PEOPLE COULD HAVE NOTICED. OR THE OPPOSITE, BEING SO FIGETY OR RESTLESS THAT YOU HAVE BEEN MOVING AROUND A LOT MORE THAN USUAL: 0
6. FEELING BAD ABOUT YOURSELF - OR THAT YOU ARE A FAILURE OR HAVE LET YOURSELF OR YOUR FAMILY DOWN: 0
1. LITTLE INTEREST OR PLEASURE IN DOING THINGS: 0

## 2022-12-06 ASSESSMENT — LIFESTYLE VARIABLES
HOW OFTEN DO YOU HAVE A DRINK CONTAINING ALCOHOL: MONTHLY OR LESS
HOW MANY STANDARD DRINKS CONTAINING ALCOHOL DO YOU HAVE ON A TYPICAL DAY: 1 OR 2

## 2022-12-06 ASSESSMENT — ENCOUNTER SYMPTOMS
CHEST TIGHTNESS: 0
CONSTIPATION: 0
ABDOMINAL PAIN: 0
SHORTNESS OF BREATH: 0
RHINORRHEA: 0
SORE THROAT: 0
EYE PAIN: 0
COUGH: 0
BACK PAIN: 1
NAUSEA: 0
BLOOD IN STOOL: 0
VOMITING: 0
DIARRHEA: 0

## 2022-12-06 NOTE — PROGRESS NOTES
[facility]) Physicians   Internal Medicine     2022  Chevy Alonso : 1933 Sex: female  Age:89 y.o. Chief Complaint   Patient presents with    Cholesterol Problem     3 month f/u    Thyroid Problem     3 month f/u    Hypertension     3 month f/u    Gastroesophageal Reflux     3 month f/u    Depression     3 month f/u    Pain     3 month f/u        HPI:   Patient presents to office for evaluation for the following medical concerns. - States having some back discomfort. . States ache that is constant. Improved if sits or lies down. Worse with walking and activity. States located to lower mid back. No radiation. No numbness, tingling. States occastional weakness in legs. Following with pain management. States has had () - median nerve branch block L4-5 and L5-S1 () - RFA of median branches of rt l4-5 and l5-s1. States taking hydrocodone daily, No reported side effects some occasional dizzy. States symptoms are worsening. States had epidural last week (2022) no reports at present. States unchanged. - States has been fatigued more recently. States Some SOB. No chest pain/discomfort. No lightheaded or dizzy. States worse with activity. Has seen cardiology. Last visit per reviewed note (10/22) -dyspnea on exertion echo normal in  order stress test. stress test (10/22) -perfusion imaging normal with attenuation artifact EKG did not change, low risk general pharmacologic stress test    - States labs showed renal insuffiencey. Was not sure if related to new medications or increased blood pressure or both. Has seen nephrology. Last visit with nephrology per reviewed note () -last creat 1.0 (),  no changes f/u 1 year with labs. Last lab (2022) showed change in dysfunction.     - States has high blood pressure. States does check blood pressure at home occasionally at home, range 120's. Off chlorthialidone 12.5mg. On verapamil, terazosin, On lisinopril.   No reported side effects. No lightheaded or dizzy. No headaches. No new vision changes. No loc/syncope. No chest pain. Stopped Chlorthalidone due to change in kidney function.       - States has high cholesterol, try's to watch diet, on atorvastatin. No reported side effects     - States depression symptoms are stable. On Zoloft, helping. No side effects. States Sever[place] season is a difficult time of year. Declines counseling. 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 No side effects reported. Administration instructions reviewed. Last Lab (9/2021) - stable. - States has urinary incontinence - stable with meds (terazosin). States urinary frequency. States stable. - States has GERD, on omeprazole - Stable with omeprazole dose. - States has had right total knee arthoplasty for arthritis. Still having right Knee pain. States also has some back pain. States also right wrist pain. States following with ortho (Dr. Merry Cifuentes). States had compression stockings. States also using heating pad to back. States needs refill of meds. Takes Norco mostly at night - helping. No reported side effects or complications reported. - states having left foot pain. States Has seen podiatry. Has bunion and hammer toes. Recommend pad. Still with discomfort.     - States has chronic rhinitis. Stable. States no cough. States nasal congestion. No sore throat. No chest pain, No fever or chills. No nausea or vomiting.     - Still reports pain to the left shoulder. The patient states the pain has been present chronically but pain has worsened recently. States located in the shoulder and pain in upper arm. No swelling. No erythema. Had seen ortho. Was given an injection.       Health Maintenance   - immunizations:   Influenza Vaccination - (1506-0412), (2022)   Pneumonia Vaccination - (12/2015) - prevnar, (12/2016) - pneumonoccal  Zoster/Shingles Vaccine  Tetanus Vaccination - (12/15/2015), (1/1/2020) - tdap   covid (1/20/2021) #1, (2/17/2021) #2, (10/2021) #3  (04/07/2022) #4 - moderna,  (10/07/2022) - bivalent     - Screenings:   Bone Density Scan - (2/28/2019)   Pelvic/Pap Exam  Mammogram - (8/14/2014), (4/2019) - neg     Colonoscopy - (2011) - neg per patient    Couseled on Home Safety - (11/28/2017)    Carotid Artery Study - (3/2016) - <50% b/l, also showed thyroid nodules,  (12/2019) - < 50% b/l     stress test (10/22) -perfusion imaging normal with attenuation artifact EKG did not change, low risk general pharmacologic stress test    ROS:  Review of Systems   Constitutional:  Negative for appetite change, chills, fever and unexpected weight change. HENT:  Negative for congestion, rhinorrhea and sore throat. Eyes:  Negative for pain and visual disturbance. Respiratory:  Negative for cough, chest tightness and shortness of breath. Cardiovascular:  Negative for chest pain and palpitations. Gastrointestinal:  Negative for abdominal pain, blood in stool, constipation, diarrhea, nausea and vomiting. Genitourinary:  Negative for difficulty urinating, dysuria, frequency, pelvic pain, urgency and vaginal bleeding. Musculoskeletal:  Positive for arthralgias and back pain. Negative for myalgias. Skin:  Negative for rash. Neurological:  Negative for dizziness, syncope, weakness, light-headedness, numbness and headaches. Hematological:  Negative for adenopathy. Psychiatric/Behavioral:  Negative for suicidal ideas. The patient is not nervous/anxious. Current Outpatient Medications:     HYDROcodone-acetaminophen (NORCO) 5-325 MG per tablet, Take 1 tablet by mouth every 8 hours as needed for Pain for up to 30 days. , Disp: 90 tablet, Rfl: 0    vitamin B-12 (CYANOCOBALAMIN) 500 MCG tablet, Take 500 mcg by mouth daily, Disp: , Rfl:     atorvastatin (LIPITOR) 20 MG tablet, Take 1 tablet by mouth daily, Disp: 90 tablet, Rfl: 3    levothyroxine (SYNTHROID) 75 MCG tablet, Take 1 tablet by mouth Daily, Disp: 90 tablet, Rfl: 3    lisinopril (PRINIVIL;ZESTRIL) 10 MG tablet, Take 1 tablet by mouth daily, Disp: 90 tablet, Rfl: 3    omeprazole (PRILOSEC) 40 MG delayed release capsule, Take 1 capsule by mouth daily Instructed to take with sip water am of procedure, Disp: 90 capsule, Rfl: 3    sertraline (ZOLOFT) 100 MG tablet, Take 1 tablet by mouth daily, Disp: 90 tablet, Rfl: 3    sertraline (ZOLOFT) 25 MG tablet, Take 1 tablet by mouth daily, Disp: 90 tablet, Rfl: 3    verapamil (CALAN SR) 240 MG extended release tablet, Take 1 tablet by mouth daily, Disp: 90 tablet, Rfl: 3    terazosin (HYTRIN) 2 MG capsule, Take 1 capsule by mouth nightly, Disp: 90 capsule, Rfl: 3    ammonium lactate (LAC-HYDRIN) 12 % lotion, Apply topically twice daily, Disp: 400 g, Rfl: 2    amoxicillin (AMOXIL) 500 MG capsule, 4 tabs 1 hr prior to dental appt, Disp: , Rfl:     aspirin 81 MG tablet, Take 81 mg by mouth daily, Disp: , Rfl:     Multiple Vitamins-Minerals (CENTRUM SILVER PO), Take 500 mg by mouth daily, Disp: , Rfl:     ascorbic acid (VITAMIN C) 500 MG tablet, Take 1,000 mg by mouth daily. 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    Depression     GERD (gastroesophageal reflux disease)     Hyperlipemia     Hypertension     Impaired fasting glucose 5/14/2019    Postoperative hypothyroidism 5/14/2019    Presence of right artificial knee joint 5/14/2019    Thyroid disease        Past Surgical History:   Procedure Laterality Date    CHOLECYSTECTOMY      EYE SURGERY      bilat cataract    HYSTERECTOMY (CERVIX STATUS UNKNOWN)      LUMBAR LAMINECTOMY  05/29/2013    L3-L4 with microdiscectomy    [place] TOTAL KNEE ARTHROPLASTY Right 5/1/2018    RIGHT KNEE TOTAL ARTHROPLASTY (CHARLES)---PRP---PNB--- performed by Ines Osullivan MD [facility]       Family History   Problem Relation Age of Onset    Cancer Mother         stomach    Heart Attack Father     Coronary Art Dis Father        Social History     Socioeconomic History    Marital status:      Spouse name: Not on file    Number of children: Not on file    Years of education: Not on file    Highest education level: Not on file   Occupational History    Not on file   Tobacco Use    Smoking status: Never    Smokeless tobacco: Never   Vaping Use    Vaping Use: Never used   Substance and Sexual Activity    Alcohol use: No    Drug use: No    Sexual activity: Not on file   Other Topics Concern    Not on file   Social History Narrative    Not on file     Social Determinants of Health     Financial Resource Strain: Unknown    Difficulty of Paying Living Expenses: Patient refused   Food Insecurity: Unknown    Worried About Running Out of Food in the Last Year: Patient refused    Ran Out of Food in the Last Year: Patient refused   Transportation Needs: Not on file   Physical Activity: Insufficiently Active    Days of Exercise per Week: 7 days    Minutes of Exercise per Session: 20 min   Stress: Not on file   Social Connections: Not on file   Intimate Partner Violence: Not on file   Housing Stability: Not on file       Vitals:    12/06/22 1442   BP: (!) 170/90   Site: Right Upper Arm   Position: Sitting   Cuff Size: Large Adult   Pulse: 82   Resp: 18   Temp: 97.5 °F (36.4 °C)   TempSrc: Temporal   SpO2: 96%   Weight: 155 lb (70.3 kg)   Height: 5' 3\" (1.6 m)       Exam:  Physical Exam  Vitals reviewed. Constitutional:       Appearance: She is well-developed. HENT:      Head: Normocephalic and atraumatic. Right Ear: External ear normal.      Left Ear: External ear normal.   Eyes:      Pupils: Pupils are equal, round, and reactive to light. Neck:      Thyroid: No thyromegaly. Vascular: Carotid bruit present. Cardiovascular:      Rate and Rhythm: Normal rate and regular rhythm. Heart sounds: Murmur heard. Pulmonary:      Effort: Pulmonary effort is normal.      Breath sounds: Normal breath sounds. No wheezing. Abdominal:      General: Bowel sounds are normal. There is no distension. Palpations: Abdomen is soft. Tenderness: There is no abdominal tenderness. There is no guarding or rebound. Musculoskeletal:      Cervical back: Normal range of motion and neck supple. Lumbar back: Tenderness present. Decreased range of motion. Lymphadenopathy:      Cervical: No cervical adenopathy. Skin:     General: Skin is warm and dry. Neurological:      Mental Status: She is alert and oriented to person, place, and time. Cranial Nerves: No cranial nerve deficit. Psychiatric:         Judgment: Judgment normal.       Assessment and Plan:    Diagnoses and all orders for this visit:    Dyspnea, unspecified type  - uncertain if deconditioning and anxiety and back pain   - has seen cardio stress negative     Chronic bilateral low back pain without sciatica  - uncertain as to cause - most prob OA   - home exercises   - discussed physical therapy - declines   - following with pain management   - continue norco as prescribed   - s/p median nerve branch block L4-5 and L5-S1, helped (9/2021)   - (12/21) - RFA of median branches of rt l4-5 and l5-s1  - states had epidural (8/2022) - no records available.    - consider physical therapy     Stage 3b chronic kidney disease ([facility] [address]  - uncertain etiology   - May be multifactorial - new medications, long standing HTN   - following with nephrology   - last lab (6/2022) showed change   - off chlorthalidone (6/2022)   - last labs today (8/2022) - stabe   - US kidney  (7/22) - Kidneys are slightly small with a small left upper pole cortical cyst.  No renal obstruction, mass or calculus    Essential hypertension  - monitor blood pressure at home  - watch diet - decreased salt.  - on verapamil   - on terasozin   - on lisinopril   - stopped chlorthialidone 12.5mg (6/2022)   - poss white coat   - elevated today 12/6/2022   - following with nephrology     Stenosis of right carotid artery  - Last US carotid 12/2019 - . Less than 50% stenosis of the right and left internal carotid artery. Mixed hyperlipidemia  - watch diet  - on atorvastatin   - follow labs (6/2022) - stable     Current mild episode of major depressive disorder, unspecified whether recurrent ([address]  - on zoloft to 100mg  - no suicidal thoughts  - states family thinks is having issues, but patient thinks is ok (12/20)   - last PHQ score of 5 (7/5/2022)   - family feels uncontrolled - will increase to 125mg daily (7/2022)     Gastroesophageal reflux disease without esophagitis  - watch diet  - on omeprazole 40mg   - stable on meds     Generalized osteoarthritis  - following with ortho  - stable    OARRS report reviewed (12/6/2022)  Controlled substance agreement updated (3/29/2021)    Controlled Substance Monitoring:    Acute and Chronic Pain Monitoring:   RX Monitoring 12/6/2022   Attestation -   Periodic Controlled Substance Monitoring Possible medication side effects, risk of tolerance/dependence & alternative treatments discussed. ;No signs of potential drug abuse or diversion identified.;Obtaining appropriate analgesic effect of treatment.      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  - s/p partial thyroidectomy  - reviewed path - Hurthle cell  - monitor labs   - increased to 75mcg (1/2020)  - labs lab (9/2021) - Stable - borderline to overactive    Postoperative hypothyroidism  - s/p partial thyroidectomy  - on synthroid  - monitor labs   - last lab (9/2021) - Stable - borderline to overactive     Impaired fasting glucose  - watch diet  - follow A1c 5.8 (8/022)     Vitamin D deficiency  - on otc supplement   - stable     Presence of right artificial knee joint    Long term current use of therapeutic drug  - Chronic PPI therapy   - follow bone and B12     Return in about 3 months (around 3/6/2023) for check up and review and labs. Orders Placed This Encounter   Procedures    Comprehensive Metabolic Panel     Standing Status:   Future     Standing Expiration Date:   12/6/2023    Magnesium     Standing Status:   Future     Standing Expiration Date:   12/6/2023    Lipid, Fasting     Standing Status:   Future     Standing Expiration Date:   12/6/2023    Hemoglobin A1C     Standing Status:   Future     Standing Expiration Date:   12/6/2023    Vitamin D 25 Hydroxy     Standing Status:   Future     Standing Expiration Date:   12/6/2023    TSH     Standing Status:   Future     Standing Expiration Date:   12/6/2023    Urinalysis     Standing Status:   Future     Standing Expiration Date:   12/6/2023    Microalbumin, Ur     Standing Status:   Future     Standing Expiration Date:   12/6/2023    Vitamin B12 & Folate     Standing Status:   Future     Standing Expiration Date:   12/6/2023    CBC with Auto Differential     Standing Status:   Future     Standing Expiration Date:   12/6/2023       Requested Prescriptions     Signed Prescriptions Disp Refills    HYDROcodone-acetaminophen (NORCO) 5-325 MG per tablet 90 tablet 0     Sig: Take 1 tablet by mouth every 8 hours as needed for Pain for up to 30 days.      Harshal Bird MD  12/6/2022  3:31 PM

## 2022-12-06 NOTE — PROGRESS NOTES
Medicare Annual Wellness Visit    Neo Stout is here for Medicare AWV    Assessment & Plan   Medicare annual wellness visit, subsequent        Health Maintenance   - immunizations:   Influenza Vaccination - (6801-3995), (2022)   Pneumonia Vaccination - (12/2015) - prevnar, (12/2016) - pneumonoccal  Zoster/Shingles Vaccine  Tetanus Vaccination - (12/15/2015), (1/1/2020) - tdap   covid (1/20/2021) #1, (2/17/2021) #2, (10/2021) #3  (04/07/2022) #4 - moderna,  (10/07/2022) - bivalent     - Screenings:   Bone Density Scan - (2/28/2019)   Pelvic/Pap Exam  Mammogram - (8/14/2014), (4/2019) - neg     Colonoscopy - (2011) - neg per patient    Recommendations for Preventive Services Due: see orders and patient instructions/AVS.  Recommended screening schedule for the next 5-10 years is provided to the patient in written form: see Patient Instructions/AVS.     Return for Medicare Annual Wellness Visit in 1 year. No orders of the defined types were placed in this encounter. Requested Prescriptions      No prescriptions requested or ordered in this encounter          Subjective       Patient's complete Health Risk Assessment and screening values have been reviewed and are found in Flowsheets. The following problems were reviewed today and where indicated follow up appointments were made and/or referrals ordered. Positive Risk Factor Screenings with Interventions:             Opioid Risk: (Low risk score <55) Opioid risk score: 9    Patient is low risk for opioid use disorder or overdose.   Last PDMP Isabelle Mclean as Reviewed:  Review User Review Instant Review Result   Pedro ESCALANTE 12/6/2022  3:27 PM     Reviewed PDMP [1]     Last Controlled Substance Monitoring Documentation      [address] Office Visit from 9/6/2022 in [facility]   Periodic Controlled Substance Monitoring Possible medication side effects, risk of tolerance/dependence & alternative treatments discussed., No signs of potential Yes Historical Provider, MD   Cholecalciferol (VITAMIN D3) 1000 units TABS Take 1,000 Units by mouth daily  Yes Historical Provider, MD   HYDROcodone-acetaminophen (NORCO) 5-325 MG per tablet Take 1 tablet by mouth every 8 hours as needed for Pain for up to 30 days.   Mauricio Casiano MD       [facility] (Including outside providers/suppliers regularly involved in providing care):   Patient Care Team:  Mauricio Casiano MD as PCP - General (Internal Medicine)  Mauricio Casiano MD as PCP - [facility] Empaneled Provider  Nicho Kapoor DO as Consulting Physician (Cardiology)     Reviewed and updated this visit:  Tobacco  Allergies  Meds  Problems  Med Hx  Surg Hx  Soc Hx  Fam Hx                 Electronically signed by Mauricio Casiano MD on 12/6/2022 at 3:34 PM

## 2022-12-06 NOTE — PATIENT INSTRUCTIONS
Personalized Preventive Plan for Lori Pierce - 12/6/2022  Medicare offers a range of preventive health benefits. Some of the tests and screenings are paid in full while other may be subject to a deductible, co-insurance, and/or copay. Some of these benefits include a comprehensive review of your medical history including lifestyle, illnesses that may run in your family, and various assessments and screenings as appropriate. After reviewing your medical record and screening and assessments performed today your provider may have ordered immunizations, labs, imaging, and/or referrals for you. A list of these orders (if applicable) as well as your Preventive Care list are included within your After Visit Summary for your review. Other Preventive Recommendations:    A preventive eye exam performed by an eye specialist is recommended every 1-2 years to screen for glaucoma; cataracts, macular degeneration, and other eye disorders. A preventive dental visit is recommended every 6 months. Try to get at least 150 minutes of exercise per week or 10,000 steps per day on a pedometer . Order or download the FREE \"Exercise & Physical Activity: Your Everyday Guide\" from [employer]. Call 0-537.881.9458 or search [employer] online. You need 0918-9191 mg of calcium and 2522-3621 IU of vitamin D per day. It is possible to meet your calcium requirement with diet alone, but a vitamin D supplement is usually necessary to meet this goal.  When exposed to the sun, use a sunscreen that protects against both UVA and UVB radiation with an SPF of 30 or greater. Reapply every 2 to 3 hours or after sweating, drying off with a towel, or swimming. Always wear a seat belt when traveling in a car. Always wear a helmet when riding a bicycle or motorcycle. Heart-Healthy Diet   Sodium, Fat, and Cholesterol Controlled Diet       What Is a Heart Healthy Diet?    A heart-healthy diet is one that limits sodium , certain types of fat , and cholesterol . This type of diet is recommended for:   People with any form of cardiovascular disease (eg, coronary heart disease , peripheral vascular disease , previous heart attack , previous stroke )   People with risk factors for cardiovascular disease, such as high blood pressure , high cholesterol , or diabetes   Anyone who wants to lower their risk of developing cardiovascular disease   Sodium    Sodium is a mineral found in many foods. In general, most people consume much more sodium than they need. Diets high in sodium can increase blood pressure and lead to edema (water retention). On a heart-healthy diet, you should consume no more than 2,300 mg (milligrams) of sodium per dayabout the amount in one teaspoon of table salt. The foods highest in sodium include table salt (about 50% sodium), processed foods, convenience foods, and preserved foods. Cholesterol    Cholesterol is a fat-like, waxy substance in your blood. Our bodies make some cholesterol. It is also found in animal products, with the highest amounts in fatty meat, egg yolks, whole milk, cheese, shellfish, and organ meats. On a heart-healthy diet, you should limit your cholesterol intake to less than 200 mg per day. It is normal and important to have some cholesterol in your bloodstream. But too much cholesterol can cause plaque to build up within your arteries, which can eventually lead to a heart attack or stroke. The two types of cholesterol that are most commonly referred to are:   Low-density lipoprotein (LDL) cholesterol  Also known as bad cholesterol, this is the cholesterol that tends to build up along your arteries. Bad cholesterol levels are increased by eating fats that are saturated or hydrogenated. Optimal level of this cholesterol is less than 100. Over 130 starts to get risky for heart disease.    High-density lipoprotein (HDL) cholesterol  Also known as good cholesterol, this type of cholesterol actually carries cholesterol away from your arteries and may, therefore, help lower your risk of having a heart attack. You want this level to be high (ideally greater than 60). It is a risk to have a level less than 40. You can raise this good cholesterol by eating olive oil, canola oil, avocados, or nuts. Exercise raises this level, too. Fat    Fat is calorie dense and packs a lot of calories into a small amount of food. Even though fats should be limited due to their high calorie content, not all fats are bad. In fact, some fats are quite healthful. Fat can be broken down into four main types. The good-for-you fats are:   Monounsaturated fat  found in oils such as olive and canola, avocados, and nuts and natural nut butters; can decrease cholesterol levels, while keeping levels of HDL cholesterol high   Polyunsaturated fat  found in oils such as safflower, sunflower, soybean, corn, and sesame; can decrease total cholesterol and LDL cholesterol   Omega-3 fatty acids  particularly those found in fatty fish (such as salmon, trout, tuna, mackerel, herring, and sardines); can decrease risk of arrhythmias, decrease triglyceride levels, and slightly lower blood pressure   The fats that you want to limit are:   Saturated fat  found in animal products, many fast foods, and a few vegetables; increases total blood cholesterol, including LDL levels   Animal fats that are saturated include: butter, lard, whole-milk dairy products, meat fat, and poultry skin   Vegetable fats that are saturated include: hydrogenated shortening, palm oil, coconut oil, cocoa butter   Hydrogenated or trans fat  found in margarine and vegetable shortening, most shelf stable snack foods, and fried foods; increases LDL and decreases HDL     It is generally recommended that you limit your total fat for the day to less than 30% of your total calories.  If you follow an 1800-calorie heart healthy diet, for example, this would mean 60 grams of fat or less per day. Saturated fat and trans fat in your diet raises your blood cholesterol the most, much more than dietary cholesterol does. For this reason, on a heart-healthy diet, less than 7% of your calories should come from saturated fat and ideally 0% from trans fat. On an 1800-calorie diet, this translates into less than 14 grams of saturated fat per day, leaving 46 grams of fat to come from mono- and polyunsaturated fats.    Food Choices on a Heart Healthy Diet   Food Category   Foods Recommended   Foods to Avoid   Grains   Breads and rolls without salted tops Most dry and cooked cereals Unsalted crackers and breadsticks Low-sodium or homemade breadcrumbs or stuffing All rice and pastas   Breads, rolls, and crackers with salted tops High-fat baked goods (eg, muffins, donuts, pastries) Quick breads, self-rising flour, and biscuit mixes Regular bread crumbs Instant hot cereals Commercially prepared rice, pasta, or stuffing mixes   Vegetables   Most fresh, frozen, and low-sodium canned vegetables Low-sodium and salt-free vegetable juices Canned vegetables if unsalted or rinsed   Regular canned vegetables and juices, including sauerkraut and pickled vegetables Frozen vegetables with sauces Commercially prepared potato and vegetable mixes   Fruits   Most fresh, frozen, and canned fruits All fruit juices   Fruits processed with salt or sodium   Milk   Nonfat or low-fat (1%) milk Nonfat or low-fat yogurt Cottage cheese, low-fat ricotta, cheeses labeled as low-fat and low-sodium   Whole milk Reduced-fat (2%) milk Malted and chocolate milk Full fat yogurt Most cheeses (unless low-fat and low salt) Buttermilk (no more than 1 cup per week)   Meats and Beans   Lean cuts of fresh or frozen beef, veal, lamb, or pork (look for the word loin) Fresh or frozen poultry without the skin Fresh or frozen fish and some shellfish Egg whites and egg substitutes (Limit whole eggs to three per week) Tofu Nuts or seeds (unsalted, dry-roasted), low-sodium peanut butter Dried peas, beans, and lentils   Any smoked, cured, salted, or canned meat, fish, or poultry (including cassidy, chipped beef, cold cuts, hot dogs, sausages, sardines, and anchovies) Poultry skins Breaded and/or fried fish or meats Canned peas, beans, and lentils Salted nuts   Fats and Oils   Olive oil and canola oil Low-sodium, low-fat salad dressings and mayonnaise   Butter, margarine, coconut and palm oils, cassidy fat   Snacks, Sweets, and Condiments   Low-sodium or unsalted versions of broths, soups, soy sauce, and condiments Pepper, herbs, and spices; vinegar, lemon, or lime juice Low-fat frozen desserts (yogurt, sherbet, fruit bars) Sugar, cocoa powder, honey, syrup, jam, and preserves Low-fat, trans-fat free cookies, cakes, and pies Rob and animal crackers, fig bars, gulshan snaps   High-fat desserts Broth, soups, gravies, and sauces, made from instant mixes or other high-sodium ingredients Salted snack foods Canned olives Meat tenderizers, seasoning salt, and most flavored vinegars   Beverages   Low-sodium carbonated beverages Tea and coffee in moderation Soy milk   Commercially softened water   Suggestions   Make whole grains, fruits, and vegetables the base of your diet. Choose heart-healthy fats such as canola, olive, and flaxseed oil, and foods high in heart-healthy fats, such as nuts, seeds, soybeans, tofu, and fish. Eat fish at least twice per week; the fish highest in omega-3 fatty acids and lowest in mercury include salmon, herring, mackerel, sardines, and canned chunk light tuna. If you eat fish less than twice per week or have high triglycerides, talk to your doctor about taking fish oil supplements. Read food labels. For products low in fat and cholesterol, look for fat free, low-fat, cholesterol free, saturated fat free, and trans fat freeAlso scan the Nutrition Facts Label, which lists saturated fat, trans fat, and cholesterol amounts. For products low in sodium, look for sodium free, very low sodium, low sodium, no added salt, and unsalted   Skip the salt when cooking or at the table; if food needs more flavor, get creative and try out different herbs and spices. Garlic and onion also add substantial flavor to foods. Trim any visible fat off meat and poultry before cooking, and drain the fat off after munoz. Use cooking methods that require little or no added fat, such as grilling, boiling, baking, poaching, broiling, roasting, steaming, stir-frying, and sauting. Avoid fast food and convenience food. They tend to be high in saturated and trans fat and have a lot of added salt. Talk to a registered dietitian for individualized diet advice. Last Reviewed: March 2011 Carla Corrigan MS, MPH, RD   Updated: 3/29/2011     Keep Your Memory Mychal Car       Many factors can affect your ability to remembera hectic lifestyle, aging, stress, chronic disease, and certain medicines. But, there are steps you can take to sharpen your mind and help preserve your memory. Challenge Your Brain   Regularly challenging your mind may help keeps it in top shape. Good mental exercises include:   Crossword puzzlesUse a dictionary if you need it; you will learn more that way. Brainteasers Try some! Crafts, such as wood working and sewing   Hobbies, such as gardening and building model airplanes   SocializingVisit old friends or join groups to meet new ones. Reading   Learning a new language   Taking a class, whether it be art history or mazin chi   TravelingExperience the food, history, and culture of your destination   Learning to use a computer   Going to museums, the theater, or thought-provoking movies   Changing things in your daily life, such as reversing your pattern in the grocery store or brushing your teeth using your nondominant hand   Use Memory Aids   There is no need to remember every detail on your own.  These memory aids can help: that calm you down, and make it routine. Manage Chronic Conditions    Side effects of high blood pressure , diabetes, and heart disease can interfere with mental function. Many of the lifestyle steps discussed here can help manage these conditions. Strive to eat a healthy diet, exercise regularly, get stress under control, and follow your doctor's advice for your condition. Minimize Medications    Talk to your doctor about the medicines that you take. Some may be unnecessary. Also, healthy lifestyle habits may lower the need for certain drugs. Last Reviewed: April 2010 Damian Wayne MD   Updated: 4/13/2010     Keeping Home a [facility]       As we get older, changes in balance, gait, strength, vision, hearing, and cognition make even the most youthful senior more prone to accidents. Falls are one of the leading health risks for older people. This increased risk of falling is related to:   Aging process (eg, decreased muscle strength, slowed reflexes)   Higher incidence of chronic health problems (eg, arthritis, diabetes) that may limit mobility, agility or sensory awareness   Side effects of medicine (eg, dizziness, blurred vision)especially medicines like prescription pain medicines and drugs used to treat mental health conditions   Depending on the brittleness of your bones, the consequences of a fall can be serious and long lasting. Home Life   Research by the Association of Aging [facility]) shows that some home accidents among older adults can be prevented by making simple lifestyle changes and basic modifications and repairs to the home environment. Here are some lifestyle changes that experts recommend:   Have your hearing and vision checked regularly. Be sure to wear prescription glasses that are right for you. Speak to your doctor or pharmacist about the possible side effects of your medicines. A number of medicines can cause dizziness. If you have problems with sleep, talk to your doctor.    Limit your intake of alcohol. If necessary, use a cane or walker to help maintain your balance. Wear supportive, rubber-soled shoes, even at home. If you live in a region that gets wintry weather, you may want to put special cleats on your shoes to prevent you from slipping on the snow and ice. Exercise regularly to help maintain muscle tone, agility, and balance. Always hold the banister when going up or down stairs. Also, use  bars when getting in or out of the bath or shower, or using the toilet. To avoid dizziness, get up slowly from a lying down position. Sit up first, dangling your legs for a minute or two before rising to a standing position. Overall Home Safety Check   According to the Consumer [employer] Safety Comm[employer]'s \"Older Consumer Home Safety Checklist,\" it is important to check for potential hazards in each room. And remember, proper lighting is an essential factor in home safety. If you cannot see clearly, you are more likely to fall. Important questions to ask yourself include:   Are lamp, electric, extension, and telephone cords placed out of the flow of traffic and maintained in good condition? Have frayed cords been replaced? Are all small rugs and runners slip resistant? If not, you can secure them to the floor with a special double-sided carpet tape. Are smoke detectors properly locatedone on every floor of your home and one outside of every sleeping area? Are they in good working order? Are batteries replaced at least once a year? Do you have a well-maintained carbon monoxide detector outside every sleeping are in your home? Does your furniture layout leave plenty of space to maneuver between and around chairs, tables, beds, and sofas? Are hallways, stairs and passages between rooms well lit? Can you reach a lamp without getting out of bed? Are floor surfaces well maintained?  Shag rugs, high-pile carpeting, tile floors, and polished wood floors can be particularly slippery. Stairs should always have handrails and be carpeted or fitted with a non-skid tread. Is your telephone easily reachable. Is the cord safely tucked away? Room by Room   According to the Association of Aging, bathrooms and marcus are the two most potentially hazardous rooms in your home. In the Kitchen    Be sure your stove is in proper working order and always make sure burners and the oven are off before you go out or go to sleep. Keep pots on the back burners, turn handles away from the front of the stove, and keep stove clean and free of grease build-up. Kitchen ventilation systems and range exhausts should be working properly. Keep flammable objects such as towels and pot holders away from the cooking area except when in use. Make sure kitchen curtains are tied back. Move cords and appliances away from the sink and hot surfaces. If extension cords are needed, install wiring guides so they do not hang over the sink, range, or working areas. Look for coffee pots, kettles and toaster ovens with automatic shut-offs. Keep a mop handy in the kitchen so you can wipe up spills instantly. You should also have a small fire extinguisher. Arrange your kitchen with frequently used items on lower shelves to avoid the need to stand on a stepstool to reach them. Make sure countertops are well-lit to avoid injuries while cutting and preparing food. In the Bathroom    Use a non-slip mat or decals in the tub and shower, since wet, soapy tile or porcelain surfaces are extremely slippery. Make sure bathroom rugs are non-skid or tape them firmly to the floor. Bathtubs should have at least one, preferably two, grab bars, firmly attached to structural supports in the wall. (Do not use built-in soap holders or glass shower doors as grab bars.)    Tub seats fitted with non-slip material on the legs allow you to wash sitting down.  For people with limited mobility, bathtub transfer benches allow you to slide safely into the tub. Raised toilet seats and toilet safety rails are helpful for those with knee or hip problems. In the [facility]    Make sure you use a nightlight and that the area around your bed is clear of potential obstacles. Be careful with electric blankets and never go to sleep with a heating pad, which can cause serious burns even if on a low setting. Use fire-resistant mattress covers and pillows, and NEVER smoke in bed. Keep a phone next to the bed that is programmed to dial 911 at the push of a button. If you have a chronic condition, you may want to sign on with an automatic call-in service. Typically the system includes a small pendant that connects directly to an emergency medical voice-response system. You should also make arrangements to stay in contact with someonefriend, neighbor, family memberon a regular schedule. Fire Prevention   According to the [employer]. (Smoke Alarms for Every) [address] [place] [address], senior citizens are one of the two highest risk groups for death and serious injuries due to residential fires. When cooking, wear short-sleeved items, never a bulky long-sleeved robe. The [facility]'s Safety Checklist for Older Consumers emphasizes the importance of checking basements, garages, workshops and storage areas for fire hazards, such as volatile liquids, piles of old rags or clothing and overloaded circuits. Never smoke in bed or when lying down on a couch or recliner chair. Small portable electric or kerosene heaters are responsible for many home fires and should be used cautiously if at all. If you do use one, be sure to keep them away from flammable materials. In case of fire, make sure you have a pre-established emergency exit plan. Have a professional check your fireplace and other fuel-burning appliances yearly.     Helping Hands   Baby boomers entering the villasenor years will continue to see the development of new products to help older adults live safely and independently in spite of age-related changes. Making Life More Livable  , by [employer], lists over 1,000 products for \"living well in the mature years,\" such as bathing and mobility aids, household security devices, ergonomically designed knives and peelers, and faucet valves and knobs for temperature control. Medical supply stores and organizations are good sources of information about products that improve your quality of life and insure your safety.      Last Reviewed: November 2009 Beatriz Mccrary MD   Updated: 3/7/2011

## 2023-02-21 DIAGNOSIS — E89.0 POSTOPERATIVE HYPOTHYROIDISM: ICD-10-CM

## 2023-02-21 DIAGNOSIS — Z79.899 LONG TERM CURRENT USE OF THERAPEUTIC DRUG: ICD-10-CM

## 2023-02-21 DIAGNOSIS — N18.32 STAGE 3B CHRONIC KIDNEY DISEASE (HCC): ICD-10-CM

## 2023-02-21 DIAGNOSIS — E78.2 MIXED HYPERLIPIDEMIA: ICD-10-CM

## 2023-02-21 DIAGNOSIS — R73.01 IMPAIRED FASTING GLUCOSE: ICD-10-CM

## 2023-02-21 LAB
ALBUMIN SERPL-MCNC: 3.9 G/DL (ref 3.5–5.2)
ALP BLD-CCNC: 115 U/L (ref 35–104)
ALT SERPL-CCNC: 23 U/L (ref 0–32)
ANION GAP SERPL CALCULATED.3IONS-SCNC: 15 MMOL/L (ref 7–16)
AST SERPL-CCNC: 30 U/L (ref 0–31)
BACTERIA: ABNORMAL /HPF
BASOPHILS ABSOLUTE: 0.02 E9/L (ref 0–0.2)
BASOPHILS RELATIVE PERCENT: 0.3 % (ref 0–2)
BILIRUB SERPL-MCNC: 0.3 MG/DL (ref 0–1.2)
BILIRUBIN URINE: NEGATIVE
BLOOD, URINE: NEGATIVE
BUN BLDV-MCNC: 37 MG/DL (ref 6–23)
CALCIUM SERPL-MCNC: 9.2 MG/DL (ref 8.6–10.2)
CHLORIDE BLD-SCNC: 106 MMOL/L (ref 98–107)
CHOLESTEROL, FASTING: 189 MG/DL (ref 0–199)
CLARITY: ABNORMAL
CO2: 23 MMOL/L (ref 22–29)
COLOR: YELLOW
CREAT SERPL-MCNC: 1.2 MG/DL (ref 0.5–1)
CRYSTALS, UA: ABNORMAL /HPF
EOSINOPHILS ABSOLUTE: 0.02 E9/L (ref 0.05–0.5)
EOSINOPHILS RELATIVE PERCENT: 0.3 % (ref 0–6)
GFR SERPL CREATININE-BSD FRML MDRD: 43 ML/MIN/1.73
GLUCOSE BLD-MCNC: 85 MG/DL (ref 74–99)
GLUCOSE URINE: NEGATIVE MG/DL
HCT VFR BLD CALC: 42.9 % (ref 34–48)
HDLC SERPL-MCNC: 85 MG/DL
HEMOGLOBIN: 13.9 G/DL (ref 11.5–15.5)
IMMATURE GRANULOCYTES #: 0.05 E9/L
IMMATURE GRANULOCYTES %: 0.7 % (ref 0–5)
KETONES, URINE: ABNORMAL MG/DL
LDL CHOLESTEROL CALCULATED: 87 MG/DL (ref 0–99)
LEUKOCYTE ESTERASE, URINE: ABNORMAL
LYMPHOCYTES ABSOLUTE: 1.37 E9/L (ref 1.5–4)
LYMPHOCYTES RELATIVE PERCENT: 18.6 % (ref 20–42)
MAGNESIUM: 2.1 MG/DL (ref 1.6–2.6)
MCH RBC QN AUTO: 32.4 PG (ref 26–35)
MCHC RBC AUTO-ENTMCNC: 32.4 % (ref 32–34.5)
MCV RBC AUTO: 100 FL (ref 80–99.9)
MICROALBUMIN UR-MCNC: <12 MG/L
MONOCYTES ABSOLUTE: 0.82 E9/L (ref 0.1–0.95)
MONOCYTES RELATIVE PERCENT: 11.1 % (ref 2–12)
NEUTROPHILS ABSOLUTE: 5.1 E9/L (ref 1.8–7.3)
NEUTROPHILS RELATIVE PERCENT: 69 % (ref 43–80)
NITRITE, URINE: NEGATIVE
PDW BLD-RTO: 13.2 FL (ref 11.5–15)
PH UA: 6 (ref 5–9)
PLATELET # BLD: 221 E9/L (ref 130–450)
PMV BLD AUTO: 12.1 FL (ref 7–12)
POTASSIUM SERPL-SCNC: 4.3 MMOL/L (ref 3.5–5)
PROTEIN UA: NEGATIVE MG/DL
RBC # BLD: 4.29 E12/L (ref 3.5–5.5)
RBC UA: ABNORMAL /HPF (ref 0–2)
SODIUM BLD-SCNC: 144 MMOL/L (ref 132–146)
SPECIFIC GRAVITY UA: 1.02 (ref 1–1.03)
TOTAL PROTEIN: 6.4 G/DL (ref 6.4–8.3)
TRIGLYCERIDE, FASTING: 85 MG/DL (ref 0–149)
TSH SERPL DL<=0.05 MIU/L-ACNC: 2.95 UIU/ML (ref 0.27–4.2)
UROBILINOGEN, URINE: 0.2 E.U./DL
VITAMIN D 25-HYDROXY: 54 NG/ML (ref 30–100)
VLDLC SERPL CALC-MCNC: 17 MG/DL
WBC # BLD: 7.4 E9/L (ref 4.5–11.5)
WBC UA: ABNORMAL /HPF (ref 0–5)

## 2023-02-22 ENCOUNTER — TELEPHONE (OUTPATIENT)
Dept: FAMILY MEDICINE CLINIC | Age: 88
End: 2023-02-22

## 2023-02-22 LAB
FOLATE: >20 NG/ML (ref 4.8–24.2)
HBA1C MFR BLD: 5.4 % (ref 4–5.6)
VITAMIN B-12: >2000 PG/ML (ref 211–946)

## 2023-02-22 NOTE — TELEPHONE ENCOUNTER
Patient notified and verbalized understanding. Protocol For Photochemotherapy For Severe Photoresponsive Dermatoses: Petrolatum And Broad Band Uvb: The patient received Photochemotherapyfor severe photoresponsive dermatoses: Petrolatum and Broad Band UVB requiring at least 4 to 8 hours of care under direct physician supervision.

## 2023-02-22 NOTE — TELEPHONE ENCOUNTER
Please let the patient know that urine analysis showed ketones which could be related to dehydration white blood cells few bacteria were noted and crystals no evidence of microscopic protein    Labs showed kidney dysfunction that was new when compared to previous, uncertain if related to medications. And/or dehydration. Recommending to try to increase fluids. May need to consider medication adjustment or follow-up with nephrology    Vitamin V10 and folic acid levels were normal    Vitamin D levels were normal    Cholesterol levels were fair. HDL or good cholesterol was elevated in beneficial range    Hemoglobin A1c is a measure 3-month sugar control was normal at 5.4.   No evidence for prediabetes or diabetes    Thyroid levels were normal    Blood counts were normal    Alkaline phosphatase level which is a enzyme found in liver pancreas bone was borderline elevated of uncertain significance    Electrolytes including magnesium, other liver functions were normal    Thanks

## 2023-03-07 ENCOUNTER — OFFICE VISIT (OUTPATIENT)
Dept: FAMILY MEDICINE CLINIC | Age: 88
End: 2023-03-07
Payer: MEDICARE

## 2023-03-07 VITALS
HEART RATE: 57 BPM | OXYGEN SATURATION: 96 % | WEIGHT: 154 LBS | HEIGHT: 63 IN | TEMPERATURE: 97.3 F | BODY MASS INDEX: 27.29 KG/M2 | SYSTOLIC BLOOD PRESSURE: 184 MMHG | DIASTOLIC BLOOD PRESSURE: 80 MMHG | RESPIRATION RATE: 18 BRPM

## 2023-03-07 DIAGNOSIS — I49.9 CARDIAC ARRHYTHMIA, UNSPECIFIED CARDIAC ARRHYTHMIA TYPE: ICD-10-CM

## 2023-03-07 DIAGNOSIS — M15.9 GENERALIZED OSTEOARTHRITIS: ICD-10-CM

## 2023-03-07 DIAGNOSIS — N39.3 STRESS INCONTINENCE OF URINE: ICD-10-CM

## 2023-03-07 DIAGNOSIS — I10 ESSENTIAL HYPERTENSION: ICD-10-CM

## 2023-03-07 DIAGNOSIS — M54.50 CHRONIC BILATERAL LOW BACK PAIN WITHOUT SCIATICA: Primary | ICD-10-CM

## 2023-03-07 DIAGNOSIS — F32.0 CURRENT MILD EPISODE OF MAJOR DEPRESSIVE DISORDER, UNSPECIFIED WHETHER RECURRENT (HCC): ICD-10-CM

## 2023-03-07 DIAGNOSIS — K21.9 GASTROESOPHAGEAL REFLUX DISEASE WITHOUT ESOPHAGITIS: ICD-10-CM

## 2023-03-07 DIAGNOSIS — G89.29 CHRONIC BILATERAL LOW BACK PAIN WITHOUT SCIATICA: Primary | ICD-10-CM

## 2023-03-07 DIAGNOSIS — R73.01 IMPAIRED FASTING GLUCOSE: ICD-10-CM

## 2023-03-07 DIAGNOSIS — E78.2 MIXED HYPERLIPIDEMIA: ICD-10-CM

## 2023-03-07 DIAGNOSIS — N18.32 STAGE 3B CHRONIC KIDNEY DISEASE (HCC): ICD-10-CM

## 2023-03-07 DIAGNOSIS — E89.0 POSTOPERATIVE HYPOTHYROIDISM: ICD-10-CM

## 2023-03-07 PROCEDURE — 1123F ACP DISCUSS/DSCN MKR DOCD: CPT | Performed by: INTERNAL MEDICINE

## 2023-03-07 PROCEDURE — 99214 OFFICE O/P EST MOD 30 MIN: CPT | Performed by: INTERNAL MEDICINE

## 2023-03-07 PROCEDURE — 93000 ELECTROCARDIOGRAM COMPLETE: CPT | Performed by: INTERNAL MEDICINE

## 2023-03-07 RX ORDER — HYDROCODONE BITARTRATE AND ACETAMINOPHEN 5; 325 MG/1; MG/1
1 TABLET ORAL EVERY 8 HOURS PRN
Qty: 90 TABLET | Refills: 0 | Status: SHIPPED | OUTPATIENT
Start: 2023-03-07 | End: 2023-04-06

## 2023-03-07 SDOH — ECONOMIC STABILITY: FOOD INSECURITY: WITHIN THE PAST 12 MONTHS, THE FOOD YOU BOUGHT JUST DIDN'T LAST AND YOU DIDN'T HAVE MONEY TO GET MORE.: PATIENT DECLINED

## 2023-03-07 SDOH — ECONOMIC STABILITY: INCOME INSECURITY: HOW HARD IS IT FOR YOU TO PAY FOR THE VERY BASICS LIKE FOOD, HOUSING, MEDICAL CARE, AND HEATING?: PATIENT DECLINED

## 2023-03-07 SDOH — ECONOMIC STABILITY: HOUSING INSECURITY
IN THE LAST 12 MONTHS, WAS THERE A TIME WHEN YOU DID NOT HAVE A STEADY PLACE TO SLEEP OR SLEPT IN A SHELTER (INCLUDING NOW)?: PATIENT REFUSED

## 2023-03-07 SDOH — ECONOMIC STABILITY: FOOD INSECURITY: WITHIN THE PAST 12 MONTHS, YOU WORRIED THAT YOUR FOOD WOULD RUN OUT BEFORE YOU GOT MONEY TO BUY MORE.: PATIENT DECLINED

## 2023-03-07 ASSESSMENT — PATIENT HEALTH QUESTIONNAIRE - PHQ9
10. IF YOU CHECKED OFF ANY PROBLEMS, HOW DIFFICULT HAVE THESE PROBLEMS MADE IT FOR YOU TO DO YOUR WORK, TAKE CARE OF THINGS AT HOME, OR GET ALONG WITH OTHER PEOPLE: 0
1. LITTLE INTEREST OR PLEASURE IN DOING THINGS: 0
SUM OF ALL RESPONSES TO PHQ QUESTIONS 1-9: 4
SUM OF ALL RESPONSES TO PHQ9 QUESTIONS 1 & 2: 1
7. TROUBLE CONCENTRATING ON THINGS, SUCH AS READING THE NEWSPAPER OR WATCHING TELEVISION: 0
8. MOVING OR SPEAKING SO SLOWLY THAT OTHER PEOPLE COULD HAVE NOTICED. OR THE OPPOSITE, BEING SO FIGETY OR RESTLESS THAT YOU HAVE BEEN MOVING AROUND A LOT MORE THAN USUAL: 0
4. FEELING TIRED OR HAVING LITTLE ENERGY: 1
5. POOR APPETITE OR OVEREATING: 0
9. THOUGHTS THAT YOU WOULD BE BETTER OFF DEAD, OR OF HURTING YOURSELF: 0
3. TROUBLE FALLING OR STAYING ASLEEP: 1
6. FEELING BAD ABOUT YOURSELF - OR THAT YOU ARE A FAILURE OR HAVE LET YOURSELF OR YOUR FAMILY DOWN: 1
SUM OF ALL RESPONSES TO PHQ QUESTIONS 1-9: 4
2. FEELING DOWN, DEPRESSED OR HOPELESS: 1
SUM OF ALL RESPONSES TO PHQ QUESTIONS 1-9: 4
SUM OF ALL RESPONSES TO PHQ QUESTIONS 1-9: 4

## 2023-03-07 ASSESSMENT — ENCOUNTER SYMPTOMS
COUGH: 0
SHORTNESS OF BREATH: 0
BACK PAIN: 1
SORE THROAT: 0
NAUSEA: 0
RHINORRHEA: 0
BLOOD IN STOOL: 0
ABDOMINAL PAIN: 0
DIARRHEA: 0
CHEST TIGHTNESS: 0
CONSTIPATION: 0
EYE PAIN: 0
VOMITING: 0

## 2023-03-07 NOTE — PROGRESS NOTES
[facility]   Internal Medicine     3/7/2023  Shubham Palma : 1933 Sex: female  Age:89 y.o. Chief Complaint   Patient presents with    Cholesterol Problem    Thyroid Problem    Hypertension    Gastroesophageal Reflux    Depression    Back Pain    Shoulder Pain     left        HPI:   Patient presents to office for evaluation for the following medical concerns. - States having some back discomfort. . States ache that is constant. Improved if sits or lies down. Worse with walking and activity. States located to lower mid back. No radiation. No numbness, tingling. States occastional weakness in legs. Following with pain management. States has had () - median nerve branch block L4-5 and L5-S1 () - RFA of median branches of rt l4-5 and l5-s1. States taking hydrocodone daily, No reported side effects some occasional dizzy. States symptoms are worsening. States had epidural (2022) no reports at present. Last visit per reviewed report  ()  b/l L4-5 transforaminal epidural steroid injection, f/u 6wks. States injections are not helping. Would like to change pain medication to twice a day     - [facility] [place] has been fatigued more recently. States Some SOB. No chest pain/discomfort. No lightheaded or dizzy. States worse with activity. Has seen cardiology. Last visit per reviewed note (10/22) -dyspnea on exertion echo normal in  order stress test. stress test (10/22) -perfusion imaging normal with attenuation artifact EKG did not change, low risk general pharmacologic stress test    - [facility] [place] labs showed renal insuffiencey. Was not sure if related to new medications or increased blood pressure or both. Has seen nephrology. Last visit with nephrology per reviewed note () -last creat 1.0 (),  no changes f/u 1 year with labs. Last lab (2023) showed change in dysfunction with creat 1.2 and gfr 43    - States has high blood pressure.  [facility] [place] does check blood pressure at home occasionally at home, range 140-170's. Off chlorthialidone 12.5mg. On verapamil, terazosin, On lisinopril. No reported side effects. No lightheaded or dizzy. No headaches. No new vision changes. No loc/syncope. No chest pain. Stopped Chlorthalidone due to change in kidney function.       - States has high cholesterol, try's to watch diet, on atorvastatin. No reported side effects     - States depression symptoms are stable. On Zoloft, helping. No side effects. States [place] season is a difficult time of year. Declines counseling. 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 No side effects reported. Administration instructions reviewed. Last Lab (2/2023) - stable. - States has urinary incontinence - stable with meds (terazosin). States urinary frequency. States stable. - States has GERD, on omeprazole - Stable with omeprazole dose. - States has had right total knee arthoplasty for arthritis. Still having right Knee pain. States also has some back pain. States also right wrist pain. States following with ortho (Dr. Anuel Whitmore). States had compression stockings. States also using heating pad to back. States needs refill of meds. Takes Norco mostly at night - helping. No reported side effects or complications reported. - states having left foot pain. States Has seen podiatry. Has bunion and hammer toes. Recommend pad. Still with discomfort.     - States has chronic rhinitis. Stable. States no cough. States nasal congestion. No sore throat. No chest pain, No fever or chills. No nausea or vomiting.     - Still reports pain to the left shoulder. The patient states the pain has been present chronically but pain has worsened recently. States located in the shoulder and pain in upper arm. No swelling. No erythema. Had seen ortho. Was given an injection.   xr cervical spine (2/23) -severe multilevel degenerative disc diseaseStates unchanged. op (2/23) -cervical epidural steroid injection C7-T1    - labs from 2/21/2023 reviewed with patient 3/7/2023    Health Maintenance   - immunizations:   Influenza Vaccination - ([id number]), (2022)   Pneumonia Vaccination - (12/2015) - prevnar, (12/2016) - pneumonoccal  Zoster/Shingles Vaccine  Tetanus Vaccination - (12/15/2015), (1/1/2020) - tdap   covid (1/20/2021) #1, (2/17/2021) #2, (10/2021) #3  (04/07/2022) #4 - moderna,  (10/07/2022) - bivalent     - Screenings:   Bone Density Scan - (2/28/2019)   Pelvic/Pap Exam  Mammogram - (8/14/2014), (4/2019) - neg     Colonoscopy - (2011) - neg per patient    Couseled on Home Safety - (11/28/2017)    Carotid Artery Study - (3/2016) - <50% b/l, also showed thyroid nodules,  (12/2019) - < 50% b/l     stress test (10/22) -perfusion imaging normal with attenuation artifact EKG did not change, low risk general pharmacologic stress test    ROS:  Review of Systems   Constitutional:  Negative for appetite change, chills, fever and unexpected weight change. HENT:  Negative for congestion, rhinorrhea and sore throat. Eyes:  Negative for pain and visual disturbance. Respiratory:  Negative for cough, chest tightness and shortness of breath. Cardiovascular:  Negative for chest pain and palpitations. Gastrointestinal:  Negative for abdominal pain, blood in stool, constipation, diarrhea, nausea and vomiting. Genitourinary:  Negative for difficulty urinating, dysuria, frequency, pelvic pain, urgency and vaginal bleeding. Musculoskeletal:  Positive for arthralgias and back pain. Negative for myalgias. Skin:  Negative for rash. Neurological:  Negative for dizziness, syncope, weakness, light-headedness, numbness and headaches. Hematological:  Negative for adenopathy. Psychiatric/Behavioral:  Negative for suicidal ideas. The patient is not nervous/anxious.         Current Outpatient Medications:     HYDROcodone-acetaminophen (NORCO) 5-325 MG per tablet, Take 1 tablet by mouth every 8 hours as needed for Pain for up to 30 days. , Disp: 90 tablet, Rfl: 0    vitamin B-12 (CYANOCOBALAMIN) 500 MCG tablet, Take 500 mcg by mouth daily, Disp: , Rfl:     atorvastatin (LIPITOR) 20 MG tablet, Take 1 tablet by mouth daily, Disp: 90 tablet, Rfl: 3    levothyroxine (SYNTHROID) 75 MCG tablet, Take 1 tablet by mouth Daily, Disp: 90 tablet, Rfl: 3    lisinopril (PRINIVIL;ZESTRIL) 10 MG tablet, Take 1 tablet by mouth daily, Disp: 90 tablet, Rfl: 3    omeprazole (PRILOSEC) 40 MG delayed release capsule, Take 1 capsule by mouth daily Instructed to take with sip water am of procedure, Disp: 90 capsule, Rfl: 3    sertraline (ZOLOFT) 100 MG tablet, Take 1 tablet by mouth daily, Disp: 90 tablet, Rfl: 3    sertraline (ZOLOFT) 25 MG tablet, Take 1 tablet by mouth daily, Disp: 90 tablet, Rfl: 3    verapamil (CALAN SR) 240 MG extended release tablet, Take 1 tablet by mouth daily, Disp: 90 tablet, Rfl: 3    terazosin (HYTRIN) 2 MG capsule, Take 1 capsule by mouth nightly, Disp: 90 capsule, Rfl: 3    ammonium lactate (LAC-HYDRIN) 12 % lotion, Apply topically twice daily, Disp: 400 g, Rfl: 2    amoxicillin (AMOXIL) 500 MG capsule, 4 tabs 1 hr prior to dental appt, Disp: , Rfl:     aspirin 81 MG tablet, Take 81 mg by mouth daily, Disp: , Rfl:     Multiple Vitamins-Minerals (CENTRUM SILVER PO), Take 500 mg by mouth daily, Disp: , Rfl:     ascorbic acid (VITAMIN C) 500 MG tablet, Take 1,000 mg by mouth daily. 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    Depression     GERD (gastroesophageal reflux disease)     Hyperlipemia     Hypertension     Impaired fasting glucose 5/14/2019    Postoperative hypothyroidism 5/14/2019    Presence of right artificial knee joint 5/14/2019    Thyroid disease        Past Surgical History:   Procedure Laterality Date    CHOLECYSTECTOMY      EYE SURGERY      bilat cataract    HYSTERECTOMY (CERVIX STATUS UNKNOWN)      LUMBAR LAMINECTOMY  05/29/2013    L3-L4 with microdiscectomy    DC ARTHRP KNE CONDYLE&PLATU MEDIAL&LAT COMPARTMENTS Right 5/1/2018    RIGHT KNEE TOTAL ARTHROPLASTY (CHARLES)---PRP---PNB--- performed by Jorge Robles MD at [facility] OR       Family History   Problem Relation Age of Onset    Cancer Mother         stomach    Heart Attack Father     Coronary Art Dis Father        Social History     Socioeconomic History    Marital status:      Spouse name: Not on file    Number of children: Not on file    Years of education: Not on file    Highest education level: Not on file   Occupational History    Not on file   Tobacco Use    Smoking status: Never    Smokeless tobacco: Never   Vaping Use    Vaping Use: Never used   Substance and Sexual Activity    Alcohol use: No    Drug use: No    Sexual activity: Not on file   Other Topics Concern    Not on file   Social History Narrative    Not on file     Social Determinants of Health     Financial Resource Strain: Unknown    Difficulty of Paying Living Expenses: Patient refused   Food Insecurity: Unknown    Worried About [address] in the Last Year: Patient refused    [address] in the Last Year: Patient refused   Transportation Needs: Unknown    Lack of Transportation (Medical):  Not on file    Lack of Transportation (Non-Medical): Patient refused   Physical Activity: Insufficiently Active    Days of Exercise per Week: 7 days    Minutes of Exercise per Session: 20 min   Stress: Not on file   Social Connections: Not on file   Intimate Partner Violence: Not on file   Housing Stability: Unknown    Unable to Pay for Housing in the Last Year: Not on file    Number of Places Lived in the Last Year: Not on file    Unstable Housing in the Last Year: Patient refused Vitals:    03/07/23 1501   BP: (!) 182/86   Site: Right Upper Arm   Position: Sitting   Cuff Size: Large Adult   Pulse: 57   Resp: 18   Temp: 97.3 °F (36.3 °C)   TempSrc: Temporal   SpO2: 96%   Weight: 154 lb (69.9 kg)   Height: 5' 3\" (1.6 m)       Exam:  Physical Exam  Vitals reviewed. Constitutional:       Appearance: She is well-developed. HENT:      Head: Normocephalic and atraumatic. Right Ear: External ear normal.      Left Ear: External ear normal.   Eyes:      Pupils: Pupils are equal, round, and reactive to light. Neck:      Thyroid: No thyromegaly. Vascular: Carotid bruit present. Cardiovascular:      Rate and Rhythm: Normal rate. Rhythm irregular. Heart sounds: Murmur heard. Pulmonary:      Effort: Pulmonary effort is normal.      Breath sounds: Normal breath sounds. No wheezing. Abdominal:      General: Bowel sounds are normal. There is no distension. Palpations: Abdomen is soft. Tenderness: There is no abdominal tenderness. There is no guarding or rebound. Musculoskeletal:      Cervical back: Normal range of motion and neck supple. Lumbar back: Tenderness present. Decreased range of motion. Lymphadenopathy:      Cervical: No cervical adenopathy. Skin:     General: Skin is warm and dry. Neurological:      Mental Status: She is alert and oriented to person, place, and time. Cranial Nerves: No cranial nerve deficit.    Psychiatric:         Judgment: Judgment normal.       Assessment and Plan:    Diagnoses and all orders for this visit:    Cardiac arrhythmia, unspecified cardiac arrhythmia type  - check ekg (3/2023) -sinus with possible premature atrial contractions causing rate variability some borderline artifactual baseline complexes appear similar when compared to EKG done in September 2022  - previous showed pac     Dyspnea, unspecified type  - uncertain if deconditioning and anxiety and back pain   - has seen cardio stress negative Chronic bilateral low back pain without sciatica  - uncertain as to cause - most prob OA   - home exercises   - discussed physical therapy - declines   - following with pain management   - continue norco as prescribed   - s/p median nerve branch block L4-5 and L5-S1, helped (9/2021)   - (12/21) - RFA of median branches of rt l4-5 and l5-s1  - states had epidural (8/2022) - no records available. - consider physical therapy     Stage 3b chronic kidney disease ([facility] [address]  - uncertain etiology   - May be multifactorial - new medications, long standing HTN   - following with nephrology   - off chlorthalidone (6/2022)   - US kidney  (7/22) - Kidneys are slightly small with a small left upper pole cortical cyst.  No renal obstruction, mass or calculus  - last lab (2/2023) showed change     Essential hypertension  - monitor blood pressure at home  - watch diet - decreased salt. - on verapamil   - on terasozin   - on lisinopril   - stopped chlorthialidone 12.5mg (6/2022)   - poss white coat   - elevated today 2/0/8882  - uncertain if white coat   - following with nephrology     Stenosis of right carotid artery  - Last US carotid 12/2019 - . Less than 50% stenosis of the right and left internal carotid artery. Mixed hyperlipidemia  - watch diet  - on atorvastatin   - follow labs (6/2022) - stable     Current mild episode of major depressive disorder, unspecified whether recurrent ([facility]  - on zoloft increased to 125mg daily (7/2022)   - no suicidal thoughts  - states family thinks is having issues, but patient thinks is ok (12/20)   - last PHQ score of 4 (3/7/2023)   - per patient stable and unchanged.      Gastroesophageal reflux disease without esophagitis  - watch diet  - on omeprazole 40mg   - stable on meds     Generalized osteoarthritis  - following with ortho  - asking for change in pain medications = asking for twice a day (3/2023), would like to speak to pain management regarding changes - will continue current treatment 3/7/2023    [place] report reviewed (3/7/2023)  Controlled substance agreement updated (3/29/2021)    Controlled Substance Monitoring:    Acute and Chronic Pain Monitoring:   RX Monitoring 3/7/2023   Attestation -   Periodic Controlled Substance Monitoring Possible medication side effects, risk of tolerance/dependence & alternative treatments discussed. ;No signs of potential drug abuse or diversion identified.;Obtaining appropriate analgesic effect of treatment. 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 Orders Placed This Encounter   Procedures    EKG 12 Lead     Order Specific Question:   Reason for Exam?     Answer:   Chest pain       Requested Prescriptions     Signed Prescriptions Disp Refills    HYDROcodone-acetaminophen (NORCO) 5-325 MG per tablet 90 tablet 0     Sig: Take 1 tablet by mouth every 8 hours as needed for Pain for up to 30 days.      Chandler Carmona MD  3/7/2023  3:47 PM

## 2023-04-18 ENCOUNTER — OFFICE VISIT (OUTPATIENT)
Dept: FAMILY MEDICINE CLINIC | Age: 88
End: 2023-04-18
Payer: MEDICARE

## 2023-04-18 VITALS
OXYGEN SATURATION: 95 % | WEIGHT: 155 LBS | TEMPERATURE: 97.4 F | BODY MASS INDEX: 27.46 KG/M2 | RESPIRATION RATE: 18 BRPM | SYSTOLIC BLOOD PRESSURE: 156 MMHG | HEIGHT: 63 IN | HEART RATE: 79 BPM | DIASTOLIC BLOOD PRESSURE: 76 MMHG

## 2023-04-18 DIAGNOSIS — K21.9 GASTROESOPHAGEAL REFLUX DISEASE WITHOUT ESOPHAGITIS: ICD-10-CM

## 2023-04-18 DIAGNOSIS — M15.9 GENERALIZED OSTEOARTHRITIS: ICD-10-CM

## 2023-04-18 DIAGNOSIS — E89.0 POSTOPERATIVE HYPOTHYROIDISM: ICD-10-CM

## 2023-04-18 DIAGNOSIS — M54.50 CHRONIC BILATERAL LOW BACK PAIN WITHOUT SCIATICA: Primary | ICD-10-CM

## 2023-04-18 DIAGNOSIS — I10 ESSENTIAL HYPERTENSION: ICD-10-CM

## 2023-04-18 DIAGNOSIS — G89.29 CHRONIC BILATERAL LOW BACK PAIN WITHOUT SCIATICA: Primary | ICD-10-CM

## 2023-04-18 DIAGNOSIS — E78.2 MIXED HYPERLIPIDEMIA: ICD-10-CM

## 2023-04-18 DIAGNOSIS — F32.0 CURRENT MILD EPISODE OF MAJOR DEPRESSIVE DISORDER, UNSPECIFIED WHETHER RECURRENT (HCC): ICD-10-CM

## 2023-04-18 DIAGNOSIS — R73.01 IMPAIRED FASTING GLUCOSE: ICD-10-CM

## 2023-04-18 DIAGNOSIS — N18.32 STAGE 3B CHRONIC KIDNEY DISEASE (HCC): ICD-10-CM

## 2023-04-18 PROCEDURE — 1123F ACP DISCUSS/DSCN MKR DOCD: CPT | Performed by: INTERNAL MEDICINE

## 2023-04-18 PROCEDURE — 99213 OFFICE O/P EST LOW 20 MIN: CPT | Performed by: INTERNAL MEDICINE

## 2023-04-18 RX ORDER — HYDROCODONE BITARTRATE AND ACETAMINOPHEN 5; 325 MG/1; MG/1
1 TABLET ORAL 2 TIMES DAILY PRN
Qty: 60 TABLET | Refills: 0 | Status: SHIPPED | OUTPATIENT
Start: 2023-04-18 | End: 2023-05-18

## 2023-04-18 ASSESSMENT — ENCOUNTER SYMPTOMS
CHEST TIGHTNESS: 0
RHINORRHEA: 0
SORE THROAT: 0
BACK PAIN: 1
COUGH: 0
NAUSEA: 0
DIARRHEA: 0
CONSTIPATION: 0
SHORTNESS OF BREATH: 0
EYE PAIN: 0
BLOOD IN STOOL: 0
ABDOMINAL PAIN: 0
VOMITING: 0

## 2023-04-18 NOTE — PROGRESS NOTES
- advise follow up in 1 month (4/2023)   - disucssed poss switch to duloxetine (4/2023)     [place] report reviewed (4/18/2023)  Controlled substance agreement updated (3/29/2021)    Controlled Substance Monitoring:    Acute and Chronic Pain Monitoring:   RX Monitoring 4/18/2023   Attestation -   Periodic Controlled Substance Monitoring Possible medication side effects, risk of tolerance/dependence & alternative treatments discussed. ;No signs of potential drug abuse or diversion identified.;Obtaining appropriate analgesic effect of treatment. 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 No orders of the defined types were placed in this encounter. Requested Prescriptions     Signed Prescriptions Disp Refills    HYDROcodone-acetaminophen (NORCO) 5-325 MG per tablet 60 tablet 0     Sig: Take 1 tablet by mouth 2 times daily as needed for Pain for up to 30 days.  Max Daily Amount: 2 tablets     Ruddy Roach MD  4/18/2023  3:14 PM

## 2023-04-20 ENCOUNTER — OFFICE VISIT (OUTPATIENT)
Dept: PODIATRY | Age: 88
End: 2023-04-20
Payer: MEDICARE

## 2023-04-20 DIAGNOSIS — M20.41 HAMMER TOES OF BOTH FEET: ICD-10-CM

## 2023-04-20 DIAGNOSIS — B35.1 TINEA UNGUIUM: Primary | ICD-10-CM

## 2023-04-20 DIAGNOSIS — M20.42 HAMMER TOES OF BOTH FEET: ICD-10-CM

## 2023-04-20 DIAGNOSIS — G60.8 HEREDITARY SENSORY NEUROPATHY: ICD-10-CM

## 2023-04-20 DIAGNOSIS — L84 CORNS AND CALLOSITIES: ICD-10-CM

## 2023-04-20 DIAGNOSIS — M21.612 BUNION, LEFT: ICD-10-CM

## 2023-04-20 PROCEDURE — 11721 DEBRIDE NAIL 6 OR MORE: CPT | Performed by: PODIATRIST

## 2023-04-20 PROCEDURE — 99999 PR OFFICE/OUTPT VISIT,PROCEDURE ONLY: CPT | Performed by: PODIATRIST

## 2023-04-20 PROCEDURE — 11056 PARNG/CUTG B9 HYPRKR LES 2-4: CPT | Performed by: PODIATRIST

## 2023-04-20 NOTE — PROGRESS NOTES
Patient in office with c/o painful lump second toe left foot. Renate Moseley MD last seen 04/18/2023. CC:    Follow-up bunion and hammertoe left second toe    HPI:  Follow-up  Hammertoes both feet. Callus tissue both feet. Does have tenderness especially second toe on the left which does have little callus tissue. No open wounds. Does have lotion on. No additional pedal complaints today. ROS:  Const: Denies constitutional symptoms  Musculo: Denies symptoms other than stated above  Skin: Denies symptoms other than stated above       Current Outpatient Medications:     HYDROcodone-acetaminophen (NORCO) 5-325 MG per tablet, Take 1 tablet by mouth 2 times daily as needed for Pain for up to 30 days.  Max Daily Amount: 2 tablets, Disp: 60 tablet, Rfl: 0    vitamin B-12 (CYANOCOBALAMIN) 500 MCG tablet, Take 1 tablet by mouth daily, Disp: , Rfl:     atorvastatin (LIPITOR) 20 MG tablet, Take 1 tablet by mouth daily, Disp: 90 tablet, Rfl: 3    levothyroxine (SYNTHROID) 75 MCG tablet, Take 1 tablet by mouth Daily, Disp: 90 tablet, Rfl: 3    lisinopril (PRINIVIL;ZESTRIL) 10 MG tablet, Take 1 tablet by mouth daily, Disp: 90 tablet, Rfl: 3    omeprazole (PRILOSEC) 40 MG delayed release capsule, Take 1 capsule by mouth daily Instructed to take with sip water am of procedure, Disp: 90 capsule, Rfl: 3    sertraline (ZOLOFT) 100 MG tablet, Take 1 tablet by mouth daily, Disp: 90 tablet, Rfl: 3    sertraline (ZOLOFT) 25 MG tablet, Take 1 tablet by mouth daily, Disp: 90 tablet, Rfl: 3    verapamil (CALAN SR) 240 MG extended release tablet, Take 1 tablet by mouth daily, Disp: 90 tablet, Rfl: 3    terazosin (HYTRIN) 2 MG capsule, Take 1 capsule by mouth nightly, Disp: 90 capsule, Rfl: 3    ammonium lactate (LAC-HYDRIN) 12 % lotion, Apply topically twice daily, Disp: 400 g, Rfl: 2    amoxicillin (AMOXIL) 500 MG capsule, 4 tabs 1 hr prior to dental appt, Disp: , Rfl:     aspirin 81 MG tablet, Take 1 tablet by mouth daily,

## 2023-04-24 ENCOUNTER — TELEPHONE (OUTPATIENT)
Dept: FAMILY MEDICINE CLINIC | Age: 88
End: 2023-04-24

## 2023-04-24 NOTE — TELEPHONE ENCOUNTER
I am uncertain that I can write this letter since I am not the physician of record for the person receiving the letter. She may need to reach out to her primary care physician.

## 2023-04-24 NOTE — TELEPHONE ENCOUNTER
Prudencio Morejon calling in. She will be going out of town and sister, Nathaly Hollingsworth, will be taking over duties of helping patient and accompanying her to her appointment. During this time, sister was summoned for [facility]. [facility] informed her if she can provide a letter stating patient needs caregiver, she could be dismissed. They are wondering if you are willing or able to do this?

## 2023-05-18 ENCOUNTER — OFFICE VISIT (OUTPATIENT)
Dept: FAMILY MEDICINE CLINIC | Age: 88
End: 2023-05-18
Payer: MEDICARE

## 2023-05-18 VITALS
RESPIRATION RATE: 18 BRPM | WEIGHT: 155 LBS | TEMPERATURE: 98.5 F | BODY MASS INDEX: 27.46 KG/M2 | DIASTOLIC BLOOD PRESSURE: 60 MMHG | SYSTOLIC BLOOD PRESSURE: 124 MMHG | HEIGHT: 63 IN | OXYGEN SATURATION: 98 % | HEART RATE: 85 BPM

## 2023-05-18 DIAGNOSIS — I49.9 CARDIAC ARRHYTHMIA, UNSPECIFIED CARDIAC ARRHYTHMIA TYPE: ICD-10-CM

## 2023-05-18 DIAGNOSIS — M54.50 CHRONIC BILATERAL LOW BACK PAIN WITHOUT SCIATICA: Primary | ICD-10-CM

## 2023-05-18 DIAGNOSIS — M15.9 GENERALIZED OSTEOARTHRITIS: ICD-10-CM

## 2023-05-18 DIAGNOSIS — G89.29 CHRONIC BILATERAL LOW BACK PAIN WITHOUT SCIATICA: Primary | ICD-10-CM

## 2023-05-18 DIAGNOSIS — I10 ESSENTIAL HYPERTENSION: ICD-10-CM

## 2023-05-18 PROCEDURE — 99213 OFFICE O/P EST LOW 20 MIN: CPT | Performed by: INTERNAL MEDICINE

## 2023-05-18 PROCEDURE — 1123F ACP DISCUSS/DSCN MKR DOCD: CPT | Performed by: INTERNAL MEDICINE

## 2023-05-18 RX ORDER — HYDROCODONE BITARTRATE AND ACETAMINOPHEN 5; 325 MG/1; MG/1
1 TABLET ORAL 2 TIMES DAILY PRN
Qty: 60 TABLET | Refills: 0 | Status: SHIPPED | OUTPATIENT
Start: 2023-05-18 | End: 2023-06-17

## 2023-05-18 ASSESSMENT — ENCOUNTER SYMPTOMS
CONSTIPATION: 0
RHINORRHEA: 0
VOMITING: 0
BACK PAIN: 1
DIARRHEA: 0
SORE THROAT: 0
SHORTNESS OF BREATH: 0
EYE PAIN: 0
BLOOD IN STOOL: 0
CHEST TIGHTNESS: 0
COUGH: 0
NAUSEA: 0
ABDOMINAL PAIN: 0

## 2023-05-18 NOTE — PROGRESS NOTES
5-325 MG per tablet, Take 1 tablet by mouth 2 times daily as needed for Pain for up to 30 days.  Max Daily Amount: 2 tablets, Disp: 60 tablet, Rfl: 0    vitamin B-12 (CYANOCOBALAMIN) 500 MCG tablet, Take 1 tablet by mouth daily, Disp: , Rfl:     atorvastatin (LIPITOR) 20 MG tablet, Take 1 tablet by mouth daily, Disp: 90 tablet, Rfl: 3    levothyroxine (SYNTHROID) 75 MCG tablet, Take 1 tablet by mouth Daily, Disp: 90 tablet, Rfl: 3    lisinopril (PRINIVIL;ZESTRIL) 10 MG tablet, Take 1 tablet by mouth daily, Disp: 90 tablet, Rfl: 3    omeprazole (PRILOSEC) 40 MG delayed release capsule, Take 1 capsule by mouth daily Instructed to take with sip water am of procedure, Disp: 90 capsule, Rfl: 3    sertraline (ZOLOFT) 100 MG tablet, Take 1 tablet by mouth daily, Disp: 90 tablet, Rfl: 3    sertraline (ZOLOFT) 25 MG tablet, Take 1 tablet by mouth daily, Disp: 90 tablet, Rfl: 3    verapamil (CALAN SR) 240 MG extended release tablet, Take 1 tablet by mouth daily, Disp: 90 tablet, Rfl: 3    terazosin (HYTRIN) 2 MG capsule, Take 1 capsule by mouth nightly, Disp: 90 capsule, Rfl: 3    ammonium lactate (LAC-HYDRIN) 12 % lotion, Apply topically twice daily, Disp: 400 g, Rfl: 2    amoxicillin (AMOXIL) 500 MG capsule, 4 tabs 1 hr prior to dental appt, Disp: , Rfl:     aspirin 81 MG tablet, Take 1 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    Depression     GERD (gastroesophageal reflux disease)     Hyperlipemia     Hypertension     Impaired fasting glucose 5/14/2019    Postoperative hypothyroidism 5/14/2019

## 2023-06-22 ENCOUNTER — PROCEDURE VISIT (OUTPATIENT)
Dept: PODIATRY | Age: 88
End: 2023-06-22
Payer: MEDICARE

## 2023-06-22 VITALS — HEIGHT: 63 IN | BODY MASS INDEX: 28 KG/M2 | WEIGHT: 158 LBS

## 2023-06-22 DIAGNOSIS — G60.8 HEREDITARY SENSORY NEUROPATHY: ICD-10-CM

## 2023-06-22 DIAGNOSIS — L84 CORNS AND CALLOSITIES: ICD-10-CM

## 2023-06-22 DIAGNOSIS — M21.612 BUNION, LEFT: ICD-10-CM

## 2023-06-22 DIAGNOSIS — B35.1 TINEA UNGUIUM: Primary | ICD-10-CM

## 2023-06-22 DIAGNOSIS — M20.41 HAMMER TOES OF BOTH FEET: ICD-10-CM

## 2023-06-22 DIAGNOSIS — M20.42 HAMMER TOES OF BOTH FEET: ICD-10-CM

## 2023-06-22 PROCEDURE — 11056 PARNG/CUTG B9 HYPRKR LES 2-4: CPT | Performed by: PODIATRIST

## 2023-06-22 PROCEDURE — 11721 DEBRIDE NAIL 6 OR MORE: CPT | Performed by: PODIATRIST

## 2023-07-11 ENCOUNTER — OFFICE VISIT (OUTPATIENT)
Dept: FAMILY MEDICINE CLINIC | Age: 88
End: 2023-07-11
Payer: MEDICARE

## 2023-07-11 VITALS
WEIGHT: 154 LBS | HEART RATE: 91 BPM | SYSTOLIC BLOOD PRESSURE: 130 MMHG | DIASTOLIC BLOOD PRESSURE: 76 MMHG | BODY MASS INDEX: 27.29 KG/M2 | TEMPERATURE: 98 F | HEIGHT: 63 IN | OXYGEN SATURATION: 95 % | RESPIRATION RATE: 20 BRPM

## 2023-07-11 DIAGNOSIS — I10 ESSENTIAL HYPERTENSION: ICD-10-CM

## 2023-07-11 DIAGNOSIS — M15.9 GENERALIZED OSTEOARTHRITIS: Primary | ICD-10-CM

## 2023-07-11 DIAGNOSIS — G89.29 CHRONIC BILATERAL LOW BACK PAIN WITHOUT SCIATICA: ICD-10-CM

## 2023-07-11 DIAGNOSIS — M54.50 CHRONIC BILATERAL LOW BACK PAIN WITHOUT SCIATICA: ICD-10-CM

## 2023-07-11 PROCEDURE — 1123F ACP DISCUSS/DSCN MKR DOCD: CPT | Performed by: INTERNAL MEDICINE

## 2023-07-11 PROCEDURE — 99213 OFFICE O/P EST LOW 20 MIN: CPT | Performed by: INTERNAL MEDICINE

## 2023-07-11 RX ORDER — HYDROCODONE BITARTRATE AND ACETAMINOPHEN 5; 325 MG/1; MG/1
1 TABLET ORAL 2 TIMES DAILY PRN
Qty: 60 TABLET | Refills: 0 | Status: SHIPPED | OUTPATIENT
Start: 2023-07-11 | End: 2023-08-10

## 2023-07-11 ASSESSMENT — ENCOUNTER SYMPTOMS
RHINORRHEA: 0
ABDOMINAL PAIN: 0
COUGH: 0
SORE THROAT: 0
SHORTNESS OF BREATH: 0
NAUSEA: 0
BACK PAIN: 1
CHEST TIGHTNESS: 0
BLOOD IN STOOL: 0
VOMITING: 0
DIARRHEA: 0
EYE PAIN: 0
CONSTIPATION: 0

## 2023-07-11 NOTE — PROGRESS NOTES
[facility]) Physicians   Internal Medicine     2023  Kayla Nunes : 1933 Sex: female  Age:89 y.o. Chief Complaint   Patient presents with    Medication Refill    Chronic Pain        HPI:   Patient presents to office for evaluation for the following medical concerns. - States having some back discomfort. . States ache that is constant. Improved if sits or lies down. Worse with walking and activity. States located to lower mid back. No radiation. No numbness, tingling. States occastional weakness in legs. Following with pain management. States has had () - median nerve branch block L4-5 and L5-S1 () - RFA of median branches of rt l4-5 and l5-s1. States taking hydrocodone daily, No reported side effects some occasional dizzy. States symptoms are worsening. States had epidural (2022) no reports at present. Last visit per reviewed report  ()  b/l L4-5 transforaminal epidural steroid injection, f/u 6wks. States injections are not helping. Change pain medication to twice a day (2023). States does not always takes 2 doses, no reported side effects. Helping symptoms 2023    - Still reports pain to the left shoulder. The patient states the pain has been present chronically but pain has worsened recently. States located in the shoulder and pain in upper arm. No swelling. No erythema. Had seen ortho. Was given an injection. xr cervical spine () -severe multilevel degenerative disc diseaseStates unchanged. op () -cervical epidural steroid injection C7-T1.  () -cervical epidural steroid injection at C7-T1, states may be helping. Still with pain but less. - States labs showed renal insuffiencey. Was not sure if related to new medications or increased blood pressure or both. Has seen nephrology. Last visit with nephrology per reviewed note () -last creat 1.0 (),  no changes f/u 1 year with labs.  Last lab (2023) showed change in dysfunction with creat 1.2

## 2023-08-10 ENCOUNTER — OFFICE VISIT (OUTPATIENT)
Dept: FAMILY MEDICINE CLINIC | Age: 88
End: 2023-08-10
Payer: MEDICARE

## 2023-08-10 VITALS
OXYGEN SATURATION: 96 % | TEMPERATURE: 97.8 F | HEIGHT: 63 IN | HEART RATE: 78 BPM | BODY MASS INDEX: 27.64 KG/M2 | RESPIRATION RATE: 20 BRPM | WEIGHT: 156 LBS | SYSTOLIC BLOOD PRESSURE: 134 MMHG | DIASTOLIC BLOOD PRESSURE: 70 MMHG

## 2023-08-10 DIAGNOSIS — Z79.899 LONG TERM CURRENT USE OF THERAPEUTIC DRUG: ICD-10-CM

## 2023-08-10 DIAGNOSIS — I10 ESSENTIAL HYPERTENSION: ICD-10-CM

## 2023-08-10 DIAGNOSIS — R73.01 IMPAIRED FASTING GLUCOSE: ICD-10-CM

## 2023-08-10 DIAGNOSIS — G89.29 CHRONIC BILATERAL LOW BACK PAIN WITHOUT SCIATICA: Primary | ICD-10-CM

## 2023-08-10 DIAGNOSIS — N18.32 STAGE 3B CHRONIC KIDNEY DISEASE (HCC): ICD-10-CM

## 2023-08-10 DIAGNOSIS — E78.2 MIXED HYPERLIPIDEMIA: Chronic | ICD-10-CM

## 2023-08-10 DIAGNOSIS — N39.3 STRESS INCONTINENCE OF URINE: ICD-10-CM

## 2023-08-10 DIAGNOSIS — M54.50 CHRONIC BILATERAL LOW BACK PAIN WITHOUT SCIATICA: Primary | ICD-10-CM

## 2023-08-10 DIAGNOSIS — M15.9 GENERALIZED OSTEOARTHRITIS: ICD-10-CM

## 2023-08-10 DIAGNOSIS — E89.0 POSTOPERATIVE HYPOTHYROIDISM: ICD-10-CM

## 2023-08-10 DIAGNOSIS — K21.9 GASTROESOPHAGEAL REFLUX DISEASE WITHOUT ESOPHAGITIS: Chronic | ICD-10-CM

## 2023-08-10 DIAGNOSIS — F32.0 CURRENT MILD EPISODE OF MAJOR DEPRESSIVE DISORDER, UNSPECIFIED WHETHER RECURRENT (HCC): Chronic | ICD-10-CM

## 2023-08-10 PROCEDURE — 99214 OFFICE O/P EST MOD 30 MIN: CPT | Performed by: INTERNAL MEDICINE

## 2023-08-10 PROCEDURE — 1123F ACP DISCUSS/DSCN MKR DOCD: CPT | Performed by: INTERNAL MEDICINE

## 2023-08-10 RX ORDER — ATORVASTATIN CALCIUM 20 MG/1
20 TABLET, FILM COATED ORAL DAILY
Qty: 90 TABLET | Refills: 3 | Status: SHIPPED | OUTPATIENT
Start: 2023-08-10

## 2023-08-10 RX ORDER — TERAZOSIN 2 MG/1
2 CAPSULE ORAL NIGHTLY
Qty: 90 CAPSULE | Refills: 3 | Status: SHIPPED | OUTPATIENT
Start: 2023-08-10

## 2023-08-10 RX ORDER — LISINOPRIL 10 MG/1
10 TABLET ORAL DAILY
Qty: 90 TABLET | Refills: 3 | Status: SHIPPED | OUTPATIENT
Start: 2023-08-10

## 2023-08-10 RX ORDER — OMEPRAZOLE 40 MG/1
40 CAPSULE, DELAYED RELEASE ORAL DAILY
Qty: 90 CAPSULE | Refills: 3 | Status: SHIPPED | OUTPATIENT
Start: 2023-08-10

## 2023-08-10 RX ORDER — VERAPAMIL HYDROCHLORIDE 240 MG/1
240 TABLET, FILM COATED, EXTENDED RELEASE ORAL DAILY
Qty: 90 TABLET | Refills: 3 | Status: SHIPPED | OUTPATIENT
Start: 2023-08-10

## 2023-08-10 RX ORDER — SERTRALINE HYDROCHLORIDE 25 MG/1
25 TABLET, FILM COATED ORAL DAILY
Qty: 90 TABLET | Refills: 3 | Status: SHIPPED | OUTPATIENT
Start: 2023-08-10

## 2023-08-10 RX ORDER — SERTRALINE HYDROCHLORIDE 100 MG/1
100 TABLET, FILM COATED ORAL DAILY
Qty: 90 TABLET | Refills: 3 | Status: SHIPPED | OUTPATIENT
Start: 2023-08-10

## 2023-08-10 RX ORDER — HYDROCODONE BITARTRATE AND ACETAMINOPHEN 5; 325 MG/1; MG/1
1 TABLET ORAL 2 TIMES DAILY PRN
Qty: 60 TABLET | Refills: 0 | Status: SHIPPED | OUTPATIENT
Start: 2023-08-10 | End: 2023-09-09

## 2023-08-10 RX ORDER — LEVOTHYROXINE SODIUM 0.07 MG/1
75 TABLET ORAL DAILY
Qty: 90 TABLET | Refills: 3 | Status: SHIPPED | OUTPATIENT
Start: 2023-08-10

## 2023-08-10 ASSESSMENT — ENCOUNTER SYMPTOMS
CONSTIPATION: 0
CHEST TIGHTNESS: 0
ABDOMINAL PAIN: 0
RHINORRHEA: 0
VOMITING: 0
BLOOD IN STOOL: 0
BACK PAIN: 1
DIARRHEA: 0
SHORTNESS OF BREATH: 0
NAUSEA: 0
EYE PAIN: 0
COUGH: 0
SORE THROAT: 0

## 2023-08-10 NOTE — PROGRESS NOTES
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    Depression     GERD (gastroesophageal reflux disease)     Hyperlipemia     Hypertension     Impaired fasting glucose 5/14/2019    Postoperative hypothyroidism 5/14/2019    Presence of right artificial knee joint 5/14/2019    Thyroid disease        Past Surgical History:   Procedure Laterality Date    CHOLECYSTECTOMY      EYE SURGERY      bilat cataract    HYSTERECTOMY (CERVIX STATUS UNKNOWN)      LUMBAR LAMINECTOMY  05/29/2013    L3-L4 with microdiscectomy    [place] ARTHRP KNE CONDYLE&PLATU MEDIAL&LAT COMPARTMENTS Right 5/1/2018    RIGHT KNEE TOTAL ARTHROPLASTY (CHARLES)---PRP---PNB--- performed by Latasha Rincon MD at [facility] OR       Family History   Problem Relation Age of Onset    Cancer Mother         stomach    Heart Attack Father     Coronary Art Dis Father        Social History     Socioeconomic History    Marital status:      Spouse name: Not on file    Number of children: Not on file    Years of education: Not on file    Highest education level: Not on file   Occupational History    Not on file   Tobacco Use    Smoking status: Never    Smokeless tobacco: Never   Vaping Use    Vaping Use: Never used   Substance and Sexual Activity    Alcohol use: No    Drug use: No    Sexual activity: Not on file   Other Topics Concern    Not on file   Social History Narrative    Not on file     Social Determinants of Health     Financial Resource Strain: Unknown    Difficulty of Paying Living Expenses: Patient refused   Food Insecurity: Unknown    Worried About [place] in the Last Year: Patient refused    [address] in the Last Year: Patient refused   Transportation Needs: Unknown    Lack of Transportation (Medical):  Not on file    Lack of Transportation (Non-Medical): Patient refused   Physical Activity: Insufficiently Active    Days of Exercise per Week: 7 days    Minutes of

## 2023-09-01 DIAGNOSIS — E89.0 POSTOPERATIVE HYPOTHYROIDISM: ICD-10-CM

## 2023-09-01 DIAGNOSIS — Z79.899 LONG TERM CURRENT USE OF THERAPEUTIC DRUG: ICD-10-CM

## 2023-09-01 DIAGNOSIS — R73.01 IMPAIRED FASTING GLUCOSE: ICD-10-CM

## 2023-09-01 DIAGNOSIS — M54.50 CHRONIC BILATERAL LOW BACK PAIN WITHOUT SCIATICA: ICD-10-CM

## 2023-09-01 DIAGNOSIS — G89.29 CHRONIC BILATERAL LOW BACK PAIN WITHOUT SCIATICA: ICD-10-CM

## 2023-09-01 DIAGNOSIS — E78.2 MIXED HYPERLIPIDEMIA: Chronic | ICD-10-CM

## 2023-09-01 DIAGNOSIS — N18.32 STAGE 3B CHRONIC KIDNEY DISEASE (HCC): ICD-10-CM

## 2023-09-01 LAB
ABSOLUTE IMMATURE GRANULOCYTE: <0.03 K/UL (ref 0–0.58)
ALBUMIN SERPL-MCNC: 4.1 G/DL (ref 3.5–5.2)
ALP BLD-CCNC: 107 U/L (ref 35–104)
ALT SERPL-CCNC: 16 U/L (ref 0–32)
ANION GAP SERPL CALCULATED.3IONS-SCNC: 13 MMOL/L (ref 7–16)
AST SERPL-CCNC: 20 U/L (ref 0–31)
BASOPHILS ABSOLUTE: 0.03 K/UL (ref 0–0.2)
BASOPHILS RELATIVE PERCENT: 1 % (ref 0–2)
BILIRUB SERPL-MCNC: 0.5 MG/DL (ref 0–1.2)
BILIRUBIN URINE: NEGATIVE
BUN BLDV-MCNC: 27 MG/DL (ref 6–23)
CALCIUM SERPL-MCNC: 9.1 MG/DL (ref 8.6–10.2)
CHLORIDE BLD-SCNC: 108 MMOL/L (ref 98–107)
CHOLESTEROL, FASTING: 156 MG/DL
CO2: 23 MMOL/L (ref 22–29)
COLOR: YELLOW
CREAT SERPL-MCNC: 1 MG/DL (ref 0.5–1)
CREATININE URINE: 165.5 MG/DL (ref 29–226)
EOSINOPHILS ABSOLUTE: 0.08 K/UL (ref 0.05–0.5)
EOSINOPHILS RELATIVE PERCENT: 1 % (ref 0–6)
FOLATE: >20 NG/ML (ref 4.8–24.2)
GFR SERPL CREATININE-BSD FRML MDRD: 55 ML/MIN/1.73M2
GLUCOSE BLD-MCNC: 84 MG/DL (ref 74–99)
GLUCOSE URINE: NEGATIVE MG/DL
HBA1C MFR BLD: 5.4 % (ref 4–5.6)
HCT VFR BLD CALC: 37.4 % (ref 34–48)
HDLC SERPL-MCNC: 71 MG/DL
HEMOGLOBIN: 12.2 G/DL (ref 11.5–15.5)
IMMATURE GRANULOCYTES: 0 % (ref 0–5)
KETONES, URINE: NEGATIVE MG/DL
LDL CHOLESTEROL: 70 MG/DL
LEUKOCYTE ESTERASE, URINE: ABNORMAL
LYMPHOCYTES ABSOLUTE: 1.53 K/UL (ref 1.5–4)
LYMPHOCYTES RELATIVE PERCENT: 26 % (ref 20–42)
MAGNESIUM: 2 MG/DL (ref 1.6–2.6)
MCH RBC QN AUTO: 32.8 PG (ref 26–35)
MCHC RBC AUTO-ENTMCNC: 32.6 G/DL (ref 32–34.5)
MCV RBC AUTO: 100.5 FL (ref 80–99.9)
MICROALBUMIN/CREAT 24H UR: <12 MG/L (ref 0–19)
MICROALBUMIN/CREAT UR-RTO: NORMAL MCG/MG CREAT (ref 0–30)
MONOCYTES ABSOLUTE: 0.6 K/UL (ref 0.1–0.95)
MONOCYTES RELATIVE PERCENT: 10 % (ref 2–12)
NEUTROPHILS ABSOLUTE: 3.73 K/UL (ref 1.8–7.3)
NEUTROPHILS RELATIVE PERCENT: 62 % (ref 43–80)
NITRITE, URINE: NEGATIVE
PDW BLD-RTO: 12.8 % (ref 11.5–15)
PH UA: 6 (ref 5–9)
PLATELET # BLD: 200 K/UL (ref 130–450)
PMV BLD AUTO: 11.8 FL (ref 7–12)
POTASSIUM SERPL-SCNC: 4.2 MMOL/L (ref 3.5–5)
PROTEIN UA: NEGATIVE MG/DL
RBC # BLD: 3.72 M/UL (ref 3.5–5.5)
SODIUM BLD-SCNC: 144 MMOL/L (ref 132–146)
SPECIFIC GRAVITY UA: 1.02 (ref 1–1.03)
TOTAL PROTEIN: 6.1 G/DL (ref 6.4–8.3)
TRIGLYCERIDE, FASTING: 74 MG/DL
TSH SERPL DL<=0.05 MIU/L-ACNC: 2.13 UIU/ML (ref 0.27–4.2)
TURBIDITY: CLEAR
URINE HGB: NEGATIVE
UROBILINOGEN, URINE: 1 EU/DL (ref 0–1)
VITAMIN B-12: 1796 PG/ML (ref 211–946)
VITAMIN D 25-HYDROXY: 62.7 NG/ML (ref 30–100)
VLDLC SERPL CALC-MCNC: 15 MG/DL
WBC # BLD: 6 K/UL (ref 4.5–11.5)

## 2023-09-02 LAB
BACTERIA: ABNORMAL
CRYSTALS, UA: ABNORMAL /HPF
EPITHELIAL CELLS UA: ABNORMAL /HPF
RBC UA: ABNORMAL /HPF
WBC UA: ABNORMAL /HPF

## 2023-09-05 LAB
6-MONOACETYLMORPHINE, URINE: NEGATIVE
ABNORMAL SPECIMEN VALIDITY TEST: ABNORMAL
ALCOHOL URINE: NOT DETECTED MG/DL
AMPHETAMINE SCREEN URINE: NEGATIVE
BARBITURATE SCREEN URINE: NEGATIVE
BENZODIAZEPINE SCREEN, URINE: NEGATIVE
BUPRENORPHINE URINE: NEGATIVE
CANNABINOID SCREEN URINE: NEGATIVE
COCAINE METABOLITE, URINE: NEGATIVE
FENTANYL URINE: NEGATIVE
INTEGRITY CHECK, CREATININE, URINE: 167.9
INTEGRITY CHECK, OXIDANT, URINE: 109
INTEGRITY CHECK, PH, URINE: 5.7
INTEGRITY CHECK, SPECIFIC GRAVITY, URINE: 1.02
METHADONE SCREEN, URINE: NEGATIVE
OPIATES, URINE: POSITIVE
OXYCODONE SCREEN URINE: NEGATIVE
PHENCYCLIDINE, URINE: NEGATIVE
TEST INFORMATION: ABNORMAL
TRAMADOL, URINE: NEGATIVE

## 2023-09-06 LAB
6-MAM, QUANTITATIVE, URINE: <10 NG/ML
CODEINE, QUANTITATIVE, URINE: <50 NG/ML
COMPLIANCE DRUG ANALYSIS, URINE: NORMAL
HYDROCODONE, QUANTITATIVE, URINE: >1000 NG/ML
HYDROMORPHONE, QUANTITATIVE, URINE: 187 NG/ML
MORPHINE, QUANTITATIVE, URINE: <50 NG/ML
NORHYDROCODONE, QUANTITATIVE, URINE: >1000 NG/ML
NOROXYCODONE, QUANTITATIVE, URINE: <50 NG/ML
OXYCODONE URINE, QUANTITATIVE: <50 NG/ML
OXYMORPHONE, QUANTITATIVE, URINE: <50 NG/ML

## 2023-09-07 ENCOUNTER — PROCEDURE VISIT (OUTPATIENT)
Dept: PODIATRY | Age: 88
End: 2023-09-07

## 2023-09-07 ENCOUNTER — OFFICE VISIT (OUTPATIENT)
Dept: FAMILY MEDICINE CLINIC | Age: 88
End: 2023-09-07
Payer: MEDICARE

## 2023-09-07 VITALS
WEIGHT: 157 LBS | BODY MASS INDEX: 27.82 KG/M2 | HEART RATE: 74 BPM | RESPIRATION RATE: 20 BRPM | SYSTOLIC BLOOD PRESSURE: 130 MMHG | TEMPERATURE: 97.5 F | DIASTOLIC BLOOD PRESSURE: 64 MMHG | HEIGHT: 63 IN | OXYGEN SATURATION: 97 %

## 2023-09-07 DIAGNOSIS — M20.42 HAMMER TOES OF BOTH FEET: ICD-10-CM

## 2023-09-07 DIAGNOSIS — B35.1 TINEA UNGUIUM: Primary | ICD-10-CM

## 2023-09-07 DIAGNOSIS — Z23 NEED FOR IMMUNIZATION AGAINST INFLUENZA: Primary | ICD-10-CM

## 2023-09-07 DIAGNOSIS — G60.8 HEREDITARY SENSORY NEUROPATHY: ICD-10-CM

## 2023-09-07 DIAGNOSIS — M21.612 BUNION, LEFT: ICD-10-CM

## 2023-09-07 DIAGNOSIS — L84 CORNS AND CALLOSITIES: ICD-10-CM

## 2023-09-07 DIAGNOSIS — M20.41 HAMMER TOES OF BOTH FEET: ICD-10-CM

## 2023-09-07 DIAGNOSIS — M15.9 GENERALIZED OSTEOARTHRITIS: ICD-10-CM

## 2023-09-07 PROCEDURE — 1123F ACP DISCUSS/DSCN MKR DOCD: CPT | Performed by: INTERNAL MEDICINE

## 2023-09-07 PROCEDURE — 99213 OFFICE O/P EST LOW 20 MIN: CPT | Performed by: INTERNAL MEDICINE

## 2023-09-07 PROCEDURE — G0008 ADMIN INFLUENZA VIRUS VAC: HCPCS | Performed by: INTERNAL MEDICINE

## 2023-09-07 PROCEDURE — 90694 VACC AIIV4 NO PRSRV 0.5ML IM: CPT | Performed by: INTERNAL MEDICINE

## 2023-09-07 RX ORDER — HYDROCODONE BITARTRATE AND ACETAMINOPHEN 5; 325 MG/1; MG/1
1 TABLET ORAL 2 TIMES DAILY PRN
Qty: 60 TABLET | Refills: 0 | Status: SHIPPED | OUTPATIENT
Start: 2023-09-07 | End: 2023-10-07

## 2023-09-07 ASSESSMENT — ENCOUNTER SYMPTOMS
BLOOD IN STOOL: 0
SORE THROAT: 0
RHINORRHEA: 0
BACK PAIN: 1
VOMITING: 0
DIARRHEA: 0
ABDOMINAL PAIN: 0
COUGH: 0
CHEST TIGHTNESS: 0
SHORTNESS OF BREATH: 0
CONSTIPATION: 0
EYE PAIN: 0
NAUSEA: 0

## 2023-09-07 NOTE — PROGRESS NOTES
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 Work History/Social History: Foot and ankle history:     Focused Lower Extremity Physical Exam:    Neurovascular examination:    Dorsalis Pedis palpable bilateral.  Posterior tibialis palpable bilateral.    Capillary Refill Time:  Immediate return  Hair growth:  Symmetrical and bilateral   Skin:  Not atrophic  Edema: No edema bilateral feet or ankles. Neurologic:  Light touch intact bilateral.      Musculoskeletal/ Orthopedic examination:    Equinis: Absent bilateral  Dorsiflexion, plantarflexion, inversion, eversion bilateral 5 out of 5 muscle strength  Wiggling toes  Negative Homans  Bunion deformity left great toe and hammertoe digits 2 through 5 left foot. No significant pain foot or ankle. Dermatology examination:    Toenails 1-5 right and left thickened, elongated, dystrophic, mycotic with subungual debris. Webspaces 1-4 bilateral clean, dry and intact. Keratotic tissue fifth metatarsal bilateral.  No open wounds. No erythema. Assessment and Plan:  Edith Huerta was seen today for callouses and nail problem.     Diagnoses and all orders for this visit:    Deborah Luong

## 2023-09-25 ENCOUNTER — TELEPHONE (OUTPATIENT)
Dept: FAMILY MEDICINE CLINIC | Age: 88
End: 2023-09-25

## 2023-09-25 NOTE — TELEPHONE ENCOUNTER
Bon Brock called today to say her Dentist, Dr. Jack Austin, ([address] [facility], [place] [phone])  is requesting \"Health Information\" before he pulls two teeth and getting new bottom dentures. She saw him on September 14th.

## 2023-09-25 NOTE — TELEPHONE ENCOUNTER
I do not know if we need a release of medical records so we can share this information with the appropriate office and staff?

## 2023-09-25 NOTE — TELEPHONE ENCOUNTER
The only medicine that I see that would be of concern to them would be the aspirin.   Otherwise I believe she can take all her medications without having to hold any

## 2023-09-25 NOTE — TELEPHONE ENCOUNTER
[address] [facility]                          [phone]  Ext 2        Anton Smith is going to need 2 extractions soon. Before that can be scheduled, they need to know if she is on any medications that will need to be held prior to that procedure?

## 2023-09-27 NOTE — TELEPHONE ENCOUNTER
Giovanna at [facility] has been notified, but they do need this to be faxed to them in writing.   Fax # [phone]

## 2023-10-09 ENCOUNTER — OFFICE VISIT (OUTPATIENT)
Dept: FAMILY MEDICINE CLINIC | Age: 88
End: 2023-10-09
Payer: COMMERCIAL

## 2023-10-09 VITALS
SYSTOLIC BLOOD PRESSURE: 126 MMHG | HEART RATE: 64 BPM | OXYGEN SATURATION: 97 % | DIASTOLIC BLOOD PRESSURE: 60 MMHG | WEIGHT: 155 LBS | RESPIRATION RATE: 24 BRPM | BODY MASS INDEX: 27.46 KG/M2 | TEMPERATURE: 97.4 F | HEIGHT: 63 IN

## 2023-10-09 DIAGNOSIS — I49.9 CARDIAC ARRHYTHMIA, UNSPECIFIED CARDIAC ARRHYTHMIA TYPE: ICD-10-CM

## 2023-10-09 DIAGNOSIS — R73.01 IMPAIRED FASTING GLUCOSE: ICD-10-CM

## 2023-10-09 DIAGNOSIS — G89.29 CHRONIC BILATERAL LOW BACK PAIN WITHOUT SCIATICA: Primary | ICD-10-CM

## 2023-10-09 DIAGNOSIS — I10 ESSENTIAL HYPERTENSION: ICD-10-CM

## 2023-10-09 DIAGNOSIS — N39.3 STRESS INCONTINENCE OF URINE: ICD-10-CM

## 2023-10-09 DIAGNOSIS — M15.9 GENERALIZED OSTEOARTHRITIS: ICD-10-CM

## 2023-10-09 DIAGNOSIS — E89.0 POSTOPERATIVE HYPOTHYROIDISM: ICD-10-CM

## 2023-10-09 DIAGNOSIS — F32.0 CURRENT MILD EPISODE OF MAJOR DEPRESSIVE DISORDER, UNSPECIFIED WHETHER RECURRENT (HCC): ICD-10-CM

## 2023-10-09 DIAGNOSIS — N18.32 STAGE 3B CHRONIC KIDNEY DISEASE (HCC): ICD-10-CM

## 2023-10-09 DIAGNOSIS — E78.2 MIXED HYPERLIPIDEMIA: ICD-10-CM

## 2023-10-09 DIAGNOSIS — M54.50 CHRONIC BILATERAL LOW BACK PAIN WITHOUT SCIATICA: Primary | ICD-10-CM

## 2023-10-09 PROCEDURE — 1123F ACP DISCUSS/DSCN MKR DOCD: CPT | Performed by: INTERNAL MEDICINE

## 2023-10-09 PROCEDURE — 99213 OFFICE O/P EST LOW 20 MIN: CPT | Performed by: INTERNAL MEDICINE

## 2023-10-09 RX ORDER — CHLORHEXIDINE GLUCONATE ORAL RINSE 1.2 MG/ML
SOLUTION DENTAL
COMMUNITY
Start: 2023-10-04

## 2023-10-09 RX ORDER — HYDROCODONE BITARTRATE AND ACETAMINOPHEN 5; 325 MG/1; MG/1
1 TABLET ORAL 2 TIMES DAILY PRN
Qty: 60 TABLET | Refills: 0 | Status: SHIPPED | OUTPATIENT
Start: 2023-10-09 | End: 2023-11-08

## 2023-10-09 RX ORDER — IBUPROFEN 800 MG/1
TABLET ORAL
COMMUNITY
Start: 2023-10-04

## 2023-10-09 ASSESSMENT — ENCOUNTER SYMPTOMS
CHEST TIGHTNESS: 0
CONSTIPATION: 0
VOMITING: 0
RHINORRHEA: 0
BACK PAIN: 1
SHORTNESS OF BREATH: 0
ABDOMINAL PAIN: 0
EYE PAIN: 0
BLOOD IN STOOL: 0
NAUSEA: 0
SORE THROAT: 0
DIARRHEA: 0
COUGH: 0

## 2023-10-09 NOTE — PROGRESS NOTES
[facility]) Physicians   Internal Medicine     10/9/2023  Robson Forrester : 1933 Sex: female  Age:89 y.o. Chief Complaint   Patient presents with    Medication Refill    Chronic Pain        HPI:   Patient presents to office for evaluation for the following medical concerns. - states had some teeth removed. States currently on antibiotic.     - States having some back discomfort. . States ache that is constant. Improved if sits or lies down. Worse with walking and activity. States located to lower mid back. No radiation. No numbness, tingling. States occastional weakness in legs. Following with pain management. States has had () - median nerve branch block L4-5 and L5-S1 () - RFA of median branches of rt l4-5 and l5-s1. States taking hydrocodone daily, No reported side effects some occasional dizzy. States symptoms are worsening. States had epidural (2022) no reports at present. Last visit per reviewed report  ()  b/l L4-5 transforaminal epidural steroid injection, f/u 6wks. States injections are not helping. Change pain medication to twice a day (2023). States does not always takes 2 doses, no reported side effects. Helping symptoms 10/9/2023, States to see pain management for consultation (2023) . - Still reports pain to the left shoulder. The patient states the pain has been present chronically but pain has worsened recently. States located in the shoulder and pain in upper arm. No swelling. No erythema. Had seen ortho. Was given an injection. xr cervical spine () -severe multilevel degenerative disc diseaseStates unchanged. op () -cervical epidural steroid injection C7-T1.  () -cervical epidural steroid injection at C7-T1, states may be helping. Still with pain uncertain if injection wearing off 10/9/2023     - States labs showed renal insuffiencey. Was not sure if related to new medications or increased blood pressure or both. Has seen nephrology.  Last

## 2023-11-06 ENCOUNTER — OFFICE VISIT (OUTPATIENT)
Dept: FAMILY MEDICINE CLINIC | Age: 88
End: 2023-11-06
Payer: COMMERCIAL

## 2023-11-06 VITALS
TEMPERATURE: 98.3 F | RESPIRATION RATE: 16 BRPM | WEIGHT: 155 LBS | OXYGEN SATURATION: 97 % | BODY MASS INDEX: 27.46 KG/M2 | HEIGHT: 63 IN | DIASTOLIC BLOOD PRESSURE: 78 MMHG | SYSTOLIC BLOOD PRESSURE: 118 MMHG | HEART RATE: 74 BPM

## 2023-11-06 DIAGNOSIS — M15.9 GENERALIZED OSTEOARTHRITIS: ICD-10-CM

## 2023-11-06 DIAGNOSIS — E89.0 POSTOPERATIVE HYPOTHYROIDISM: ICD-10-CM

## 2023-11-06 DIAGNOSIS — G89.29 CHRONIC BILATERAL LOW BACK PAIN WITHOUT SCIATICA: Primary | ICD-10-CM

## 2023-11-06 DIAGNOSIS — E78.2 MIXED HYPERLIPIDEMIA: ICD-10-CM

## 2023-11-06 DIAGNOSIS — N39.3 STRESS INCONTINENCE OF URINE: ICD-10-CM

## 2023-11-06 DIAGNOSIS — N18.32 STAGE 3B CHRONIC KIDNEY DISEASE (HCC): ICD-10-CM

## 2023-11-06 DIAGNOSIS — R73.01 IMPAIRED FASTING GLUCOSE: ICD-10-CM

## 2023-11-06 DIAGNOSIS — Z23 NEED FOR VACCINATION: ICD-10-CM

## 2023-11-06 DIAGNOSIS — F32.0 CURRENT MILD EPISODE OF MAJOR DEPRESSIVE DISORDER, UNSPECIFIED WHETHER RECURRENT (HCC): ICD-10-CM

## 2023-11-06 DIAGNOSIS — I49.9 CARDIAC ARRHYTHMIA, UNSPECIFIED CARDIAC ARRHYTHMIA TYPE: ICD-10-CM

## 2023-11-06 DIAGNOSIS — I10 ESSENTIAL HYPERTENSION: ICD-10-CM

## 2023-11-06 DIAGNOSIS — M54.50 CHRONIC BILATERAL LOW BACK PAIN WITHOUT SCIATICA: Primary | ICD-10-CM

## 2023-11-06 PROCEDURE — 99213 OFFICE O/P EST LOW 20 MIN: CPT | Performed by: INTERNAL MEDICINE

## 2023-11-06 PROCEDURE — 90677 PCV20 VACCINE IM: CPT | Performed by: INTERNAL MEDICINE

## 2023-11-06 PROCEDURE — 1123F ACP DISCUSS/DSCN MKR DOCD: CPT | Performed by: INTERNAL MEDICINE

## 2023-11-06 PROCEDURE — G0009 ADMIN PNEUMOCOCCAL VACCINE: HCPCS | Performed by: INTERNAL MEDICINE

## 2023-11-06 RX ORDER — HYDROCODONE BITARTRATE AND ACETAMINOPHEN 5; 325 MG/1; MG/1
1 TABLET ORAL 2 TIMES DAILY PRN
Qty: 60 TABLET | Refills: 0 | Status: SHIPPED | OUTPATIENT
Start: 2023-11-06 | End: 2023-12-06

## 2023-11-06 ASSESSMENT — ENCOUNTER SYMPTOMS
SHORTNESS OF BREATH: 0
NAUSEA: 0
EYE PAIN: 0
RHINORRHEA: 0
BACK PAIN: 1
VOMITING: 0
CHEST TIGHTNESS: 0
CONSTIPATION: 0
COUGH: 0
ABDOMINAL PAIN: 0
DIARRHEA: 0
SORE THROAT: 0
BLOOD IN STOOL: 0

## 2023-11-09 ENCOUNTER — PROCEDURE VISIT (OUTPATIENT)
Dept: PODIATRY | Age: 88
End: 2023-11-09
Payer: COMMERCIAL

## 2023-11-09 DIAGNOSIS — M21.612 BUNION, LEFT: ICD-10-CM

## 2023-11-09 DIAGNOSIS — L84 CORNS AND CALLOSITIES: ICD-10-CM

## 2023-11-09 DIAGNOSIS — M20.41 HAMMER TOES OF BOTH FEET: ICD-10-CM

## 2023-11-09 DIAGNOSIS — M20.42 HAMMER TOES OF BOTH FEET: ICD-10-CM

## 2023-11-09 DIAGNOSIS — G60.8 HEREDITARY SENSORY NEUROPATHY: ICD-10-CM

## 2023-11-09 DIAGNOSIS — B35.1 TINEA UNGUIUM: Primary | ICD-10-CM

## 2023-11-09 PROCEDURE — 11056 PARNG/CUTG B9 HYPRKR LES 2-4: CPT | Performed by: PODIATRIST

## 2023-11-09 PROCEDURE — 11721 DEBRIDE NAIL 6 OR MORE: CPT | Performed by: PODIATRIST

## 2023-11-09 NOTE — PROGRESS NOTES
Patient in office to follow up with tricia. Jun Bauer MD last seen 11/06/2023. CC:    Follow-up bunion and hammertoe left second toe  Elongated toenails both feet and callus tissue    HPI:  Follow-up foot ankle exam.  Long toenails both feet. No open wounds. No foot or ankle pain today. History aspirin 81 mg daily. ROS:  Const: Denies constitutional symptoms  Musculo: Denies symptoms other than stated above  Skin: Denies symptoms other than stated above       Current Outpatient Medications:     HYDROcodone-acetaminophen (NORCO) 5-325 MG per tablet, Take 1 tablet by mouth 2 times daily as needed for Pain for up to 30 days.  Max Daily Amount: 2 tablets, Disp: 60 tablet, Rfl: 0    ibuprofen (ADVIL;MOTRIN) 800 MG tablet, , Disp: , Rfl:     chlorhexidine (PERIDEX) 0.12 % solution, , Disp: , Rfl:     verapamil (CALAN SR) 240 MG extended release tablet, Take 1 tablet by mouth daily, Disp: 90 tablet, Rfl: 3    terazosin (HYTRIN) 2 MG capsule, Take 1 capsule by mouth nightly, Disp: 90 capsule, Rfl: 3    sertraline (ZOLOFT) 25 MG tablet, Take 1 tablet by mouth daily, Disp: 90 tablet, Rfl: 3    sertraline (ZOLOFT) 100 MG tablet, Take 1 tablet by mouth daily, Disp: 90 tablet, Rfl: 3    omeprazole (PRILOSEC) 40 MG delayed release capsule, Take 1 capsule by mouth daily Instructed to take with sip water am of procedure, Disp: 90 capsule, Rfl: 3    lisinopril (PRINIVIL;ZESTRIL) 10 MG tablet, Take 1 tablet by mouth daily, Disp: 90 tablet, Rfl: 3    levothyroxine (SYNTHROID) 75 MCG tablet, Take 1 tablet by mouth Daily, Disp: 90 tablet, Rfl: 3    atorvastatin (LIPITOR) 20 MG tablet, Take 1 tablet by mouth daily, Disp: 90 tablet, Rfl: 3    vitamin B-12 (CYANOCOBALAMIN) 500 MCG tablet, Take 1 tablet by mouth daily, Disp: , Rfl:     ammonium lactate (LAC-HYDRIN) 12 % lotion, Apply topically twice daily, Disp: 400 g, Rfl: 2    amoxicillin (AMOXIL) 500 MG capsule, 4 tabs 1 hr prior to dental appt, Disp: ,

## 2023-12-07 ENCOUNTER — OFFICE VISIT (OUTPATIENT)
Dept: FAMILY MEDICINE CLINIC | Age: 88
End: 2023-12-07
Payer: COMMERCIAL

## 2023-12-07 VITALS
RESPIRATION RATE: 18 BRPM | BODY MASS INDEX: 27.46 KG/M2 | SYSTOLIC BLOOD PRESSURE: 130 MMHG | HEART RATE: 63 BPM | WEIGHT: 155 LBS | DIASTOLIC BLOOD PRESSURE: 60 MMHG | TEMPERATURE: 97.7 F | OXYGEN SATURATION: 95 % | HEIGHT: 63 IN

## 2023-12-07 VITALS
SYSTOLIC BLOOD PRESSURE: 130 MMHG | HEART RATE: 63 BPM | BODY MASS INDEX: 27.46 KG/M2 | WEIGHT: 155 LBS | DIASTOLIC BLOOD PRESSURE: 60 MMHG | TEMPERATURE: 97.7 F | OXYGEN SATURATION: 95 % | RESPIRATION RATE: 18 BRPM | HEIGHT: 63 IN

## 2023-12-07 DIAGNOSIS — N18.32 STAGE 3B CHRONIC KIDNEY DISEASE (HCC): ICD-10-CM

## 2023-12-07 DIAGNOSIS — M54.50 CHRONIC BILATERAL LOW BACK PAIN WITHOUT SCIATICA: Primary | ICD-10-CM

## 2023-12-07 DIAGNOSIS — I10 ESSENTIAL HYPERTENSION: ICD-10-CM

## 2023-12-07 DIAGNOSIS — G89.29 CHRONIC BILATERAL LOW BACK PAIN WITHOUT SCIATICA: Primary | ICD-10-CM

## 2023-12-07 DIAGNOSIS — R73.01 IMPAIRED FASTING GLUCOSE: ICD-10-CM

## 2023-12-07 DIAGNOSIS — E89.0 POSTOPERATIVE HYPOTHYROIDISM: ICD-10-CM

## 2023-12-07 DIAGNOSIS — M15.9 GENERALIZED OSTEOARTHRITIS: ICD-10-CM

## 2023-12-07 DIAGNOSIS — E78.2 MIXED HYPERLIPIDEMIA: ICD-10-CM

## 2023-12-07 DIAGNOSIS — Z00.00 MEDICARE ANNUAL WELLNESS VISIT, SUBSEQUENT: Primary | ICD-10-CM

## 2023-12-07 DIAGNOSIS — I49.9 CARDIAC ARRHYTHMIA, UNSPECIFIED CARDIAC ARRHYTHMIA TYPE: ICD-10-CM

## 2023-12-07 DIAGNOSIS — F32.0 CURRENT MILD EPISODE OF MAJOR DEPRESSIVE DISORDER, UNSPECIFIED WHETHER RECURRENT (HCC): ICD-10-CM

## 2023-12-07 PROCEDURE — 99213 OFFICE O/P EST LOW 20 MIN: CPT | Performed by: INTERNAL MEDICINE

## 2023-12-07 PROCEDURE — G0439 PPPS, SUBSEQ VISIT: HCPCS | Performed by: INTERNAL MEDICINE

## 2023-12-07 PROCEDURE — 1123F ACP DISCUSS/DSCN MKR DOCD: CPT | Performed by: INTERNAL MEDICINE

## 2023-12-07 RX ORDER — HYDROCODONE BITARTRATE AND ACETAMINOPHEN 5; 325 MG/1; MG/1
1 TABLET ORAL 2 TIMES DAILY PRN
Qty: 60 TABLET | Refills: 0 | Status: SHIPPED | OUTPATIENT
Start: 2023-12-07 | End: 2024-01-06

## 2023-12-07 ASSESSMENT — PATIENT HEALTH QUESTIONNAIRE - PHQ9
SUM OF ALL RESPONSES TO PHQ QUESTIONS 1-9: 6
6. FEELING BAD ABOUT YOURSELF - OR THAT YOU ARE A FAILURE OR HAVE LET YOURSELF OR YOUR FAMILY DOWN: 0
1. LITTLE INTEREST OR PLEASURE IN DOING THINGS: 2
10. IF YOU CHECKED OFF ANY PROBLEMS, HOW DIFFICULT HAVE THESE PROBLEMS MADE IT FOR YOU TO DO YOUR WORK, TAKE CARE OF THINGS AT HOME, OR GET ALONG WITH OTHER PEOPLE: 0
7. TROUBLE CONCENTRATING ON THINGS, SUCH AS READING THE NEWSPAPER OR WATCHING TELEVISION: 0
9. THOUGHTS THAT YOU WOULD BE BETTER OFF DEAD, OR OF HURTING YOURSELF: 0
SUM OF ALL RESPONSES TO PHQ QUESTIONS 1-9: 6
4. FEELING TIRED OR HAVING LITTLE ENERGY: 2
SUM OF ALL RESPONSES TO PHQ9 QUESTIONS 1 & 2: 3
3. TROUBLE FALLING OR STAYING ASLEEP: 1
5. POOR APPETITE OR OVEREATING: 0
8. MOVING OR SPEAKING SO SLOWLY THAT OTHER PEOPLE COULD HAVE NOTICED. OR THE OPPOSITE, BEING SO FIGETY OR RESTLESS THAT YOU HAVE BEEN MOVING AROUND A LOT MORE THAN USUAL: 0
SUM OF ALL RESPONSES TO PHQ QUESTIONS 1-9: 6
2. FEELING DOWN, DEPRESSED OR HOPELESS: 1
SUM OF ALL RESPONSES TO PHQ QUESTIONS 1-9: 6

## 2023-12-07 ASSESSMENT — ENCOUNTER SYMPTOMS
CONSTIPATION: 0
ABDOMINAL PAIN: 0
DIARRHEA: 0
SHORTNESS OF BREATH: 0
COUGH: 0
SORE THROAT: 0
BLOOD IN STOOL: 0
CHEST TIGHTNESS: 0
VOMITING: 0
EYE PAIN: 0
NAUSEA: 0
BACK PAIN: 1
RHINORRHEA: 0

## 2023-12-07 ASSESSMENT — COLUMBIA-SUICIDE SEVERITY RATING SCALE - C-SSRS
5. HAVE YOU STARTED TO WORK OUT OR WORKED OUT THE DETAILS OF HOW TO KILL YOURSELF? DO YOU INTEND TO CARRY OUT THIS PLAN?: NO
4. HAVE YOU HAD THESE THOUGHTS AND HAD SOME INTENTION OF ACTING ON THEM?: NO
7. DID THIS OCCUR IN THE LAST THREE MONTHS: NO
3. HAVE YOU BEEN THINKING ABOUT HOW YOU MIGHT KILL YOURSELF?: NO

## 2023-12-07 NOTE — PATIENT INSTRUCTIONS
low in fat and cholesterol, look for fat free, low-fat, cholesterol free, saturated fat free, and trans fat freeAlso scan the Nutrition Facts Label, which lists saturated fat, trans fat, and cholesterol amounts. For products low in sodium, look for sodium free, very low sodium, low sodium, no added salt, and unsalted   Skip the salt when cooking or at the table; if food needs more flavor, get creative and try out different herbs and spices. Garlic and onion also add substantial flavor to foods. Trim any visible fat off meat and poultry before cooking, and drain the fat off after munoz. Use cooking methods that require little or no added fat, such as grilling, boiling, baking, poaching, broiling, roasting, steaming, stir-frying, and sauting. Avoid fast food and convenience food. They tend to be high in saturated and trans fat and have a lot of added salt. Talk to a registered dietitian for individualized diet advice. Last Reviewed: March 2011 Suha Camacho MS, MPH, RD   Updated: 3/29/2011       Preventing Osteoporosis: After Your Visit  Your Care Instructions  Osteoporosis means the bones are weak and thin enough that they can break easily. The older you are, the more likely you are to get osteoporosis. But with plenty of calcium, vitamin D, and exercise, you can help prevent osteoporosis. The preteen and teen years are a key time for bone building. With the help of calcium, vitamin D, and exercise in those early years and beyond, the bones reach their peak density and strength by age 27. After age 27, your bones naturally start to thin and weaken. The stronger your bones are at around age 27, the lower your risk for osteoporosis. But no matter what your age and risk are, your bones still need calcium, vitamin D, and exercise to stay strong. Also avoid smoking, and limit alcohol. Smoking and heavy alcohol use can make your bones thinner.   Talk to your doctor about any special risks you might

## 2023-12-07 NOTE — PROGRESS NOTES
(7/22) - Kidneys are slightly small with a small left upper pole cortical cyst.  No renal obstruction, mass or calculus  - last lab (9/2023) stable     Essential hypertension  - monitor blood pressure at home  - watch diet - decreased salt. - on verapamil   - on terasozin   - on lisinopril   - stopped chlorthialidone 12.5mg (6/2022)   - poss white coat   - improved in office today 35/6/4671  - uncertain if white coat   - following with nephrology     Stenosis of right carotid artery  - Last US carotid 12/2019 - . Less than 50% stenosis of the right and left internal carotid artery. Mixed hyperlipidemia  - watch diet  - on atorvastatin   - follow labs (9/2023) - stable     Current mild episode of major depressive disorder, unspecified whether recurrent ([address])  - on zoloft increased to 125mg daily (7/2022)   - no suicidal thoughts  - states family thinks is having issues, but patient thinks is ok (12/20)   - last PHQ score of 4 (3/7/2023)   - per patient stable and unchanged. 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 No orders of the defined types were placed in this encounter.     Requested Prescriptions     Signed Prescriptions Disp Refills

## 2024-01-02 ENCOUNTER — OFFICE VISIT (OUTPATIENT)
Dept: FAMILY MEDICINE CLINIC | Age: 89
End: 2024-01-02
Payer: COMMERCIAL

## 2024-01-02 VITALS
DIASTOLIC BLOOD PRESSURE: 84 MMHG | BODY MASS INDEX: 27.29 KG/M2 | RESPIRATION RATE: 22 BRPM | HEART RATE: 85 BPM | TEMPERATURE: 97.6 F | HEIGHT: 63 IN | SYSTOLIC BLOOD PRESSURE: 160 MMHG | OXYGEN SATURATION: 100 % | WEIGHT: 154 LBS

## 2024-01-02 DIAGNOSIS — N18.32 STAGE 3B CHRONIC KIDNEY DISEASE (HCC): ICD-10-CM

## 2024-01-02 DIAGNOSIS — G89.29 CHRONIC BILATERAL LOW BACK PAIN WITHOUT SCIATICA: Primary | ICD-10-CM

## 2024-01-02 DIAGNOSIS — M54.50 CHRONIC BILATERAL LOW BACK PAIN WITHOUT SCIATICA: Primary | ICD-10-CM

## 2024-01-02 DIAGNOSIS — F32.0 CURRENT MILD EPISODE OF MAJOR DEPRESSIVE DISORDER, UNSPECIFIED WHETHER RECURRENT (HCC): ICD-10-CM

## 2024-01-02 DIAGNOSIS — I10 ESSENTIAL HYPERTENSION: ICD-10-CM

## 2024-01-02 DIAGNOSIS — M15.9 GENERALIZED OSTEOARTHRITIS: ICD-10-CM

## 2024-01-02 DIAGNOSIS — E78.2 MIXED HYPERLIPIDEMIA: ICD-10-CM

## 2024-01-02 DIAGNOSIS — E89.0 POSTOPERATIVE HYPOTHYROIDISM: ICD-10-CM

## 2024-01-02 DIAGNOSIS — F11.20 OPIOID DEPENDENCE WITH CURRENT USE (HCC): ICD-10-CM

## 2024-01-02 DIAGNOSIS — I49.9 CARDIAC ARRHYTHMIA, UNSPECIFIED CARDIAC ARRHYTHMIA TYPE: ICD-10-CM

## 2024-01-02 DIAGNOSIS — R73.01 IMPAIRED FASTING GLUCOSE: ICD-10-CM

## 2024-01-02 PROCEDURE — 99213 OFFICE O/P EST LOW 20 MIN: CPT | Performed by: INTERNAL MEDICINE

## 2024-01-02 PROCEDURE — 1123F ACP DISCUSS/DSCN MKR DOCD: CPT | Performed by: INTERNAL MEDICINE

## 2024-01-02 RX ORDER — HYDROCODONE BITARTRATE AND ACETAMINOPHEN 5; 325 MG/1; MG/1
1 TABLET ORAL 2 TIMES DAILY PRN
Qty: 60 TABLET | Refills: 0 | Status: SHIPPED | OUTPATIENT
Start: 2024-01-02 | End: 2024-02-01

## 2024-01-02 ASSESSMENT — ENCOUNTER SYMPTOMS
EYE PAIN: 0
ABDOMINAL PAIN: 0
DIARRHEA: 0
NAUSEA: 0
VOMITING: 0
BACK PAIN: 1
COUGH: 0
CHEST TIGHTNESS: 0
RHINORRHEA: 0
SORE THROAT: 0
CONSTIPATION: 0
BLOOD IN STOOL: 0
SHORTNESS OF BREATH: 0

## 2024-01-02 ASSESSMENT — PATIENT HEALTH QUESTIONNAIRE - PHQ9
6. FEELING BAD ABOUT YOURSELF - OR THAT YOU ARE A FAILURE OR HAVE LET YOURSELF OR YOUR FAMILY DOWN: 0
SUM OF ALL RESPONSES TO PHQ QUESTIONS 1-9: 1
3. TROUBLE FALLING OR STAYING ASLEEP: 0
10. IF YOU CHECKED OFF ANY PROBLEMS, HOW DIFFICULT HAVE THESE PROBLEMS MADE IT FOR YOU TO DO YOUR WORK, TAKE CARE OF THINGS AT HOME, OR GET ALONG WITH OTHER PEOPLE: 0
SUM OF ALL RESPONSES TO PHQ QUESTIONS 1-9: 1
1. LITTLE INTEREST OR PLEASURE IN DOING THINGS: 0
7. TROUBLE CONCENTRATING ON THINGS, SUCH AS READING THE NEWSPAPER OR WATCHING TELEVISION: 0
SUM OF ALL RESPONSES TO PHQ QUESTIONS 1-9: 1
SUM OF ALL RESPONSES TO PHQ9 QUESTIONS 1 & 2: 1
8. MOVING OR SPEAKING SO SLOWLY THAT OTHER PEOPLE COULD HAVE NOTICED. OR THE OPPOSITE, BEING SO FIGETY OR RESTLESS THAT YOU HAVE BEEN MOVING AROUND A LOT MORE THAN USUAL: 0
4. FEELING TIRED OR HAVING LITTLE ENERGY: 0
5. POOR APPETITE OR OVEREATING: 0
SUM OF ALL RESPONSES TO PHQ QUESTIONS 1-9: 1
2. FEELING DOWN, DEPRESSED OR HOPELESS: 1
9. THOUGHTS THAT YOU WOULD BE BETTER OFF DEAD, OR OF HURTING YOURSELF: 0

## 2024-01-02 NOTE — PROGRESS NOTES
[facility] Physicians   Internal Medicine     2024  Briana Mosqueda : 1933 Sex: female  Age:89 y.o.    Chief Complaint   Patient presents with    Medication Refill    Chronic Pain        HPI:   Patient presents to office for evaluation for the following medical concerns.    - States was seen by audiology. States to have hearing aid to left ear.     - States having some back discomfort.. States ache that is constant. Improved if sits or lies down. Worse with walking and activity. States located to lower mid back. No radiation.  No numbness, tingling. States occastional weakness in legs. Following with pain management. States has had () - median nerve branch block L4-5 and L5-S1 () - RFA of median branches of rt l4-5 and l5-s1. States taking hydrocodone daily, No reported side effects some occasional dizzy. States symptoms are worsening. States had epidural (2022) no reports at present.  () - op b/l L5-S1 transforaminal epidural steroid injection.  Last visit per reviewed report  (). Change pain medication to twice a day (2023). States does not always takes 2 doses, no reported side effects. Helping symptoms 2024, States last injection (2023) have helped.     - Still reports pain to the left shoulder.  The patient states the pain has been present chronically but pain has worsened recently. States located in the shoulder and pain in upper arm. No swelling. No erythema. Had seen ortho. Was given an injection.  xr cervical spine () -severe multilevel degenerative disc diseaseStates unchanged. op () -cervical epidural steroid injection C7-T1.  () -cervical epidural steroid injection at C7-T1, states may be helping. Still with pain 2024, stable     - States labs showed renal insuffiencey. Was not sure if related to new medications or increased blood pressure or both. Has seen nephrology. Last visit with nephrology per reviewed note  () -CKD 3, hypertension

## 2024-01-30 ENCOUNTER — OFFICE VISIT (OUTPATIENT)
Dept: FAMILY MEDICINE CLINIC | Age: 89
End: 2024-01-30
Payer: COMMERCIAL

## 2024-01-30 VITALS
HEIGHT: 63 IN | BODY MASS INDEX: 27.82 KG/M2 | WEIGHT: 157 LBS | TEMPERATURE: 97.5 F | RESPIRATION RATE: 18 BRPM | SYSTOLIC BLOOD PRESSURE: 160 MMHG | HEART RATE: 50 BPM | OXYGEN SATURATION: 98 % | DIASTOLIC BLOOD PRESSURE: 76 MMHG

## 2024-01-30 DIAGNOSIS — M15.9 GENERALIZED OSTEOARTHRITIS: ICD-10-CM

## 2024-01-30 PROCEDURE — 99213 OFFICE O/P EST LOW 20 MIN: CPT | Performed by: INTERNAL MEDICINE

## 2024-01-30 PROCEDURE — 1123F ACP DISCUSS/DSCN MKR DOCD: CPT | Performed by: INTERNAL MEDICINE

## 2024-01-30 RX ORDER — HYDROCODONE BITARTRATE AND ACETAMINOPHEN 5; 325 MG/1; MG/1
1 TABLET ORAL 2 TIMES DAILY PRN
Qty: 60 TABLET | Refills: 0 | Status: SHIPPED | OUTPATIENT
Start: 2024-01-30 | End: 2024-02-29

## 2024-01-30 ASSESSMENT — ENCOUNTER SYMPTOMS
CONSTIPATION: 0
NAUSEA: 0
RHINORRHEA: 0
VOMITING: 0
BLOOD IN STOOL: 0
SORE THROAT: 0
SHORTNESS OF BREATH: 0
EYE PAIN: 0
ABDOMINAL PAIN: 0
BACK PAIN: 1
DIARRHEA: 0
CHEST TIGHTNESS: 0
COUGH: 0

## 2024-01-30 NOTE — PROGRESS NOTES
[facility] Physicians   Internal Medicine     2024  Briana Mosqueda : 1933 Sex: female  Age:89 y.o.    Chief Complaint   Patient presents with    Medication Refill    Chronic Pain        HPI:   Patient presents to office for evaluation for the following medical concerns.    - States was seen by audiology. States to have hearing aid to left ear. States having some left ear pain.     - States having some back discomfort.. States ache that is constant. Improved if sits or lies down. Worse with walking and activity. States located to lower mid back. No radiation.  No numbness, tingling. States occastional weakness in legs. Following with pain management. States has had () - median nerve branch block L4-5 and L5-S1 () - RFA of median branches of rt l4-5 and l5-s1. States taking hydrocodone daily, No reported side effects some occasional dizzy. States symptoms are worsening. States had epidural (2022) no reports at present.  () - op b/l L5-S1 transforaminal epidural steroid injection.  Last visit per reviewed report  (). States last injection (2023) have helped.  Change pain medication to twice a day (2023). States does not always takes 2 doses, no reported side effects. Helping symptoms 2024.     - Still reports pain to the left shoulder.  The patient states the pain has been present chronically but pain has worsened recently. States located in the shoulder and pain in upper arm. No swelling. No erythema. Had seen ortho. Was given an injection.  xr cervical spine () -severe multilevel degenerative disc diseaseStates unchanged. op () -cervical epidural steroid injection C7-T1.  () -cervical epidural steroid injection at C7-T1, states may be helping. Still with pain 2024, stable     - States labs showed renal insuffiencey. Was not sure if related to new medications or increased blood pressure or both. Has seen nephrology. Last visit with nephrology per

## 2024-01-31 ENCOUNTER — TELEPHONE (OUTPATIENT)
Dept: FAMILY MEDICINE CLINIC | Age: 89
End: 2024-01-31

## 2024-01-31 RX ORDER — FLUTICASONE PROPIONATE 50 MCG
1 SPRAY, SUSPENSION (ML) NASAL DAILY
Qty: 16 G | Refills: 2 | Status: SHIPPED | OUTPATIENT
Start: 2024-01-31

## 2024-01-31 NOTE — TELEPHONE ENCOUNTER
Patient was seen by the audiologist today.  The audiologist was recommending flonase.  Patient and daughter were instructed to call PCP to make sure medication would interact with patient's other medications.  Daughter and patient were instructed to obtain medication OTC.  Daughter wondering if Rx was sent in if patient's insurance would cover.  Daughter asking if you could send in Rx to [facility] in [place]?  Please advise.

## 2024-01-31 NOTE — TELEPHONE ENCOUNTER
Requested Prescriptions     Signed Prescriptions Disp Refills    fluticasone (FLONASE) 50 MCG/ACT nasal spray 16 g 2     Si spray by Each Nostril route daily     Authorizing Provider: JAVON EDMONDSON     Sent to pharmacy

## 2024-02-27 ENCOUNTER — OFFICE VISIT (OUTPATIENT)
Dept: FAMILY MEDICINE CLINIC | Age: 89
End: 2024-02-27
Payer: COMMERCIAL

## 2024-02-27 ENCOUNTER — TELEPHONE (OUTPATIENT)
Dept: ADMINISTRATIVE | Age: 89
End: 2024-02-27

## 2024-02-27 VITALS
HEIGHT: 63 IN | BODY MASS INDEX: 28 KG/M2 | TEMPERATURE: 98 F | RESPIRATION RATE: 18 BRPM | OXYGEN SATURATION: 94 % | WEIGHT: 158 LBS | HEART RATE: 84 BPM | DIASTOLIC BLOOD PRESSURE: 80 MMHG | SYSTOLIC BLOOD PRESSURE: 176 MMHG

## 2024-02-27 VITALS
BODY MASS INDEX: 28 KG/M2 | RESPIRATION RATE: 18 BRPM | HEIGHT: 63 IN | OXYGEN SATURATION: 94 % | HEART RATE: 84 BPM | SYSTOLIC BLOOD PRESSURE: 176 MMHG | DIASTOLIC BLOOD PRESSURE: 80 MMHG | TEMPERATURE: 98 F | WEIGHT: 158 LBS

## 2024-02-27 DIAGNOSIS — E89.0 POSTOPERATIVE HYPOTHYROIDISM: ICD-10-CM

## 2024-02-27 DIAGNOSIS — I10 ESSENTIAL HYPERTENSION: ICD-10-CM

## 2024-02-27 DIAGNOSIS — Z00.00 MEDICARE ANNUAL WELLNESS VISIT, SUBSEQUENT: Primary | ICD-10-CM

## 2024-02-27 DIAGNOSIS — G89.29 CHRONIC BILATERAL LOW BACK PAIN WITHOUT SCIATICA: Primary | ICD-10-CM

## 2024-02-27 DIAGNOSIS — E78.2 MIXED HYPERLIPIDEMIA: ICD-10-CM

## 2024-02-27 DIAGNOSIS — M54.50 CHRONIC BILATERAL LOW BACK PAIN WITHOUT SCIATICA: Primary | ICD-10-CM

## 2024-02-27 DIAGNOSIS — M15.9 GENERALIZED OSTEOARTHRITIS: ICD-10-CM

## 2024-02-27 DIAGNOSIS — I49.9 CARDIAC ARRHYTHMIA, UNSPECIFIED CARDIAC ARRHYTHMIA TYPE: ICD-10-CM

## 2024-02-27 DIAGNOSIS — K21.9 GASTROESOPHAGEAL REFLUX DISEASE WITHOUT ESOPHAGITIS: ICD-10-CM

## 2024-02-27 DIAGNOSIS — R73.01 IMPAIRED FASTING GLUCOSE: ICD-10-CM

## 2024-02-27 DIAGNOSIS — F32.0 CURRENT MILD EPISODE OF MAJOR DEPRESSIVE DISORDER, UNSPECIFIED WHETHER RECURRENT (HCC): ICD-10-CM

## 2024-02-27 DIAGNOSIS — R01.1 MURMUR, CARDIAC: ICD-10-CM

## 2024-02-27 DIAGNOSIS — Z79.899 LONG TERM CURRENT USE OF THERAPEUTIC DRUG: ICD-10-CM

## 2024-02-27 DIAGNOSIS — N18.32 STAGE 3B CHRONIC KIDNEY DISEASE (HCC): ICD-10-CM

## 2024-02-27 PROCEDURE — G0439 PPPS, SUBSEQ VISIT: HCPCS | Performed by: INTERNAL MEDICINE

## 2024-02-27 PROCEDURE — 1123F ACP DISCUSS/DSCN MKR DOCD: CPT | Performed by: INTERNAL MEDICINE

## 2024-02-27 PROCEDURE — 99214 OFFICE O/P EST MOD 30 MIN: CPT | Performed by: INTERNAL MEDICINE

## 2024-02-27 RX ORDER — HYDROCODONE BITARTRATE AND ACETAMINOPHEN 5; 325 MG/1; MG/1
1 TABLET ORAL 2 TIMES DAILY PRN
Qty: 60 TABLET | Refills: 0 | Status: SHIPPED | OUTPATIENT
Start: 2024-02-27 | End: 2024-03-28

## 2024-02-27 ASSESSMENT — ENCOUNTER SYMPTOMS
SORE THROAT: 0
COUGH: 0
ABDOMINAL PAIN: 0
VOMITING: 0
NAUSEA: 0
CHEST TIGHTNESS: 0
RHINORRHEA: 0
SHORTNESS OF BREATH: 0
EYE PAIN: 0
BLOOD IN STOOL: 0
BACK PAIN: 1
CONSTIPATION: 0
DIARRHEA: 0

## 2024-02-27 ASSESSMENT — PATIENT HEALTH QUESTIONNAIRE - PHQ9
8. MOVING OR SPEAKING SO SLOWLY THAT OTHER PEOPLE COULD HAVE NOTICED. OR THE OPPOSITE, BEING SO FIGETY OR RESTLESS THAT YOU HAVE BEEN MOVING AROUND A LOT MORE THAN USUAL: 0
SUM OF ALL RESPONSES TO PHQ QUESTIONS 1-9: 1
SUM OF ALL RESPONSES TO PHQ QUESTIONS 1-9: 1
4. FEELING TIRED OR HAVING LITTLE ENERGY: 1
SUM OF ALL RESPONSES TO PHQ9 QUESTIONS 1 & 2: 0
6. FEELING BAD ABOUT YOURSELF - OR THAT YOU ARE A FAILURE OR HAVE LET YOURSELF OR YOUR FAMILY DOWN: 0
3. TROUBLE FALLING OR STAYING ASLEEP: 0
1. LITTLE INTEREST OR PLEASURE IN DOING THINGS: 0
7. TROUBLE CONCENTRATING ON THINGS, SUCH AS READING THE NEWSPAPER OR WATCHING TELEVISION: 0
10. IF YOU CHECKED OFF ANY PROBLEMS, HOW DIFFICULT HAVE THESE PROBLEMS MADE IT FOR YOU TO DO YOUR WORK, TAKE CARE OF THINGS AT HOME, OR GET ALONG WITH OTHER PEOPLE: 0
2. FEELING DOWN, DEPRESSED OR HOPELESS: 0
SUM OF ALL RESPONSES TO PHQ QUESTIONS 1-9: 1
5. POOR APPETITE OR OVEREATING: 0
9. THOUGHTS THAT YOU WOULD BE BETTER OFF DEAD, OR OF HURTING YOURSELF: 0
SUM OF ALL RESPONSES TO PHQ QUESTIONS 1-9: 1

## 2024-02-27 ASSESSMENT — LIFESTYLE VARIABLES
HOW MANY STANDARD DRINKS CONTAINING ALCOHOL DO YOU HAVE ON A TYPICAL DAY: 1 OR 2
HOW OFTEN DO YOU HAVE A DRINK CONTAINING ALCOHOL: MONTHLY OR LESS

## 2024-02-27 NOTE — PROGRESS NOTES
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    Depression     GERD (gastroesophageal reflux disease)     Hyperlipemia     Hypertension     Impaired fasting glucose 5/14/2019    Postoperative hypothyroidism 5/14/2019    Presence of right artificial knee joint 5/14/2019    Thyroid disease        Past Surgical History:   Procedure Laterality Date    CHOLECYSTECTOMY      EYE SURGERY      bilat cataract    HYSTERECTOMY (CERVIX STATUS UNKNOWN)      LUMBAR LAMINECTOMY  05/29/2013    L3-L4 with microdiscectomy    [place] ARTHRP KNE CONDYLE&PLATU MEDIAL&LAT COMPARTMENTS Right 5/1/2018    RIGHT KNEE TOTAL ARTHROPLASTY (CHARLES)---PRP---PNB--- performed by Chau Adams MD at [facility] OR       Family History   Problem Relation Age of Onset    Cancer Mother         stomach    Heart Attack Father     Coronary Art Dis Father        Social History     Socioeconomic History    Marital status:      Spouse name: Not on file    Number of children: Not on file    Years of education: Not on file    Highest education level: Not on file   Occupational History    Not on file   Tobacco Use    Smoking status: Never    Smokeless tobacco: Never   Vaping Use    Vaping Use: Never used   Substance and Sexual Activity    Alcohol use: No    Drug use: No    Sexual activity: Not on file   Other Topics Concern    Not on file   Social History Narrative    Not on file     Social Determinants of Health     Financial Resource Strain: Patient Declined (3/7/2023)    Overall Financial Resource Strain (CARDIA)     Difficulty of

## 2024-02-27 NOTE — TELEPHONE ENCOUNTER
Referral from Dr Tran, DX:  Cardiac arrhythmia, unspecified cardiac arrhythmia type-- Murmur, cardiac;  KADE Schultz 9/28/2022  Please advise scheduling

## 2024-02-27 NOTE — PATIENT INSTRUCTIONS
Optimal level of this cholesterol is less than 100. Over 130 starts to get risky for heart disease.   High-density lipoprotein (HDL) cholesterol  Also known as good cholesterol, this type of cholesterol actually carries cholesterol away from your arteries and may, therefore, help lower your risk of having a heart attack. You want this level to be high (ideally greater than 60). It is a risk to have a level less than 40. You can raise this good cholesterol by eating olive oil, canola oil, avocados, or nuts. Exercise raises this level, too.   Fat    Fat is calorie dense and packs a lot of calories into a small amount of food. Even though fats should be limited due to their high calorie content, not all fats are bad. In fact, some fats are quite healthful. Fat can be broken down into four main types.   The good-for-you fats are:   Monounsaturated fat  found in oils such as olive and canola, avocados, and nuts and natural nut butters; can decrease cholesterol levels, while keeping levels of HDL cholesterol high   Polyunsaturated fat  found in oils such as safflower, sunflower, soybean, corn, and sesame; can decrease total cholesterol and LDL cholesterol   Omega-3 fatty acids  particularly those found in fatty fish (such as salmon, trout, tuna, mackerel, herring, and sardines); can decrease risk of arrhythmias, decrease triglyceride levels, and slightly lower blood pressure   The fats that you want to limit are:   Saturated fat  found in animal products, many fast foods, and a few vegetables; increases total blood cholesterol, including LDL levels   Animal fats that are saturated include: butter, lard, whole-milk dairy products, meat fat, and poultry skin   Vegetable fats that are saturated include: hydrogenated shortening, palm oil, coconut oil, cocoa butter   Hydrogenated or trans fat  found in margarine and vegetable shortening, most shelf stable snack foods, and fried foods; increases LDL and decreases HDL     It is

## 2024-03-21 DIAGNOSIS — R73.01 IMPAIRED FASTING GLUCOSE: ICD-10-CM

## 2024-03-21 DIAGNOSIS — Z79.899 LONG TERM CURRENT USE OF THERAPEUTIC DRUG: ICD-10-CM

## 2024-03-21 DIAGNOSIS — E89.0 POSTOPERATIVE HYPOTHYROIDISM: ICD-10-CM

## 2024-03-21 DIAGNOSIS — E78.2 MIXED HYPERLIPIDEMIA: ICD-10-CM

## 2024-03-21 DIAGNOSIS — N18.32 STAGE 3B CHRONIC KIDNEY DISEASE (HCC): ICD-10-CM

## 2024-03-21 LAB
ABSOLUTE IMMATURE GRANULOCYTE: <0.03 K/UL (ref 0–0.58)
ALBUMIN SERPL-MCNC: 4.1 G/DL (ref 3.5–5.2)
ALP BLD-CCNC: 109 U/L (ref 35–104)
ALT SERPL-CCNC: 14 U/L (ref 0–32)
ANION GAP SERPL CALCULATED.3IONS-SCNC: 13 MMOL/L (ref 7–16)
AST SERPL-CCNC: 19 U/L (ref 0–31)
BACTERIA: ABNORMAL
BASOPHILS ABSOLUTE: 0.03 K/UL (ref 0–0.2)
BASOPHILS RELATIVE PERCENT: 1 % (ref 0–2)
BILIRUB SERPL-MCNC: 0.6 MG/DL (ref 0–1.2)
BILIRUBIN URINE: NEGATIVE
BUN BLDV-MCNC: 22 MG/DL (ref 6–23)
CALCIUM SERPL-MCNC: 9.4 MG/DL (ref 8.6–10.2)
CHLORIDE BLD-SCNC: 105 MMOL/L (ref 98–107)
CHOLESTEROL, FASTING: 169 MG/DL
CO2: 24 MMOL/L (ref 22–29)
COLOR: YELLOW
CREAT SERPL-MCNC: 1 MG/DL (ref 0.5–1)
CREATININE URINE: 300.9 MG/DL (ref 29–226)
CRYSTALS, UA: ABNORMAL /HPF
EOSINOPHILS ABSOLUTE: 0.1 K/UL (ref 0.05–0.5)
EOSINOPHILS RELATIVE PERCENT: 2 % (ref 0–6)
EPITHELIAL CELLS UA: ABNORMAL /HPF
FOLATE: >20 NG/ML (ref 4.8–24.2)
GFR SERPL CREATININE-BSD FRML MDRD: 54 ML/MIN/1.73M2
GLUCOSE BLD-MCNC: 109 MG/DL (ref 74–99)
GLUCOSE URINE: NEGATIVE MG/DL
HCT VFR BLD CALC: 41.6 % (ref 34–48)
HDLC SERPL-MCNC: 64 MG/DL
HEMOGLOBIN: 13.4 G/DL (ref 11.5–15.5)
IMMATURE GRANULOCYTES: 0 % (ref 0–5)
KETONES, URINE: 15 MG/DL
LDL CHOLESTEROL: 86 MG/DL
LEUKOCYTE ESTERASE, URINE: ABNORMAL
LYMPHOCYTES ABSOLUTE: 1.39 K/UL (ref 1.5–4)
LYMPHOCYTES RELATIVE PERCENT: 22 % (ref 20–42)
MAGNESIUM: 1.9 MG/DL (ref 1.6–2.6)
MCH RBC QN AUTO: 31.5 PG (ref 26–35)
MCHC RBC AUTO-ENTMCNC: 32.2 G/DL (ref 32–34.5)
MCV RBC AUTO: 97.9 FL (ref 80–99.9)
MICROALBUMIN/CREAT 24H UR: 26 MG/L (ref 0–19)
MICROALBUMIN/CREAT UR-RTO: 9 MCG/MG CREAT (ref 0–30)
MONOCYTES ABSOLUTE: 0.51 K/UL (ref 0.1–0.95)
MONOCYTES RELATIVE PERCENT: 8 % (ref 2–12)
NEUTROPHILS ABSOLUTE: 4.43 K/UL (ref 1.8–7.3)
NEUTROPHILS RELATIVE PERCENT: 68 % (ref 43–80)
NITRITE, URINE: NEGATIVE
PDW BLD-RTO: 12.4 % (ref 11.5–15)
PH UA: 5.5 (ref 5–9)
PLATELET # BLD: 155 K/UL (ref 130–450)
PMV BLD AUTO: 12.4 FL (ref 7–12)
POTASSIUM SERPL-SCNC: 4.3 MMOL/L (ref 3.5–5)
PROTEIN UA: NEGATIVE MG/DL
RBC # BLD: 4.25 M/UL (ref 3.5–5.5)
RBC UA: ABNORMAL /HPF
SODIUM BLD-SCNC: 142 MMOL/L (ref 132–146)
SPECIFIC GRAVITY UA: 1.02 (ref 1–1.03)
T4 FREE: 1.3 NG/DL (ref 0.9–1.7)
TOTAL PROTEIN: 6.6 G/DL (ref 6.4–8.3)
TRIGLYCERIDE, FASTING: 96 MG/DL
TSH SERPL DL<=0.05 MIU/L-ACNC: 1.51 UIU/ML (ref 0.27–4.2)
TURBIDITY: CLEAR
URINE HGB: NEGATIVE
UROBILINOGEN, URINE: 0.2 EU/DL (ref 0–1)
VITAMIN B-12: 1300 PG/ML (ref 211–946)
VLDLC SERPL CALC-MCNC: 19 MG/DL
WBC # BLD: 6.5 K/UL (ref 4.5–11.5)
WBC UA: ABNORMAL /HPF

## 2024-03-22 LAB
HBA1C MFR BLD: 5.6 % (ref 4–5.6)
VITAMIN D 25-HYDROXY: 54.9 NG/ML (ref 30–100)

## 2024-03-26 ENCOUNTER — OFFICE VISIT (OUTPATIENT)
Dept: FAMILY MEDICINE CLINIC | Age: 89
End: 2024-03-26
Payer: COMMERCIAL

## 2024-03-26 VITALS
SYSTOLIC BLOOD PRESSURE: 148 MMHG | BODY MASS INDEX: 28.35 KG/M2 | OXYGEN SATURATION: 96 % | TEMPERATURE: 97.9 F | HEART RATE: 72 BPM | HEIGHT: 63 IN | DIASTOLIC BLOOD PRESSURE: 84 MMHG | RESPIRATION RATE: 22 BRPM | WEIGHT: 160 LBS

## 2024-03-26 DIAGNOSIS — M15.9 GENERALIZED OSTEOARTHRITIS: ICD-10-CM

## 2024-03-26 DIAGNOSIS — F32.0 CURRENT MILD EPISODE OF MAJOR DEPRESSIVE DISORDER, UNSPECIFIED WHETHER RECURRENT (HCC): ICD-10-CM

## 2024-03-26 DIAGNOSIS — I49.9 CARDIAC ARRHYTHMIA, UNSPECIFIED CARDIAC ARRHYTHMIA TYPE: ICD-10-CM

## 2024-03-26 DIAGNOSIS — D34 HURTHLE CELL TUMOR: ICD-10-CM

## 2024-03-26 DIAGNOSIS — N18.31 STAGE 3A CHRONIC KIDNEY DISEASE (HCC): ICD-10-CM

## 2024-03-26 DIAGNOSIS — R73.01 IMPAIRED FASTING GLUCOSE: ICD-10-CM

## 2024-03-26 DIAGNOSIS — M54.50 CHRONIC BILATERAL LOW BACK PAIN WITHOUT SCIATICA: ICD-10-CM

## 2024-03-26 DIAGNOSIS — K21.9 GASTROESOPHAGEAL REFLUX DISEASE WITHOUT ESOPHAGITIS: Primary | ICD-10-CM

## 2024-03-26 DIAGNOSIS — R01.1 MURMUR, CARDIAC: ICD-10-CM

## 2024-03-26 DIAGNOSIS — I10 ESSENTIAL HYPERTENSION: ICD-10-CM

## 2024-03-26 DIAGNOSIS — G89.29 CHRONIC BILATERAL LOW BACK PAIN WITHOUT SCIATICA: ICD-10-CM

## 2024-03-26 DIAGNOSIS — E89.0 POSTOPERATIVE HYPOTHYROIDISM: ICD-10-CM

## 2024-03-26 DIAGNOSIS — E78.2 MIXED HYPERLIPIDEMIA: ICD-10-CM

## 2024-03-26 PROCEDURE — 99213 OFFICE O/P EST LOW 20 MIN: CPT | Performed by: INTERNAL MEDICINE

## 2024-03-26 PROCEDURE — 1123F ACP DISCUSS/DSCN MKR DOCD: CPT | Performed by: INTERNAL MEDICINE

## 2024-03-26 RX ORDER — HYDROCODONE BITARTRATE AND ACETAMINOPHEN 5; 325 MG/1; MG/1
1 TABLET ORAL 2 TIMES DAILY PRN
Qty: 60 TABLET | Refills: 0 | Status: SHIPPED | OUTPATIENT
Start: 2024-03-26 | End: 2024-04-25

## 2024-03-26 SDOH — ECONOMIC STABILITY: INCOME INSECURITY: HOW HARD IS IT FOR YOU TO PAY FOR THE VERY BASICS LIKE FOOD, HOUSING, MEDICAL CARE, AND HEATING?: NOT HARD AT ALL

## 2024-03-26 SDOH — ECONOMIC STABILITY: HOUSING INSECURITY
IN THE LAST 12 MONTHS, WAS THERE A TIME WHEN YOU DID NOT HAVE A STEADY PLACE TO SLEEP OR SLEPT IN A SHELTER (INCLUDING NOW)?: NO

## 2024-03-26 SDOH — ECONOMIC STABILITY: FOOD INSECURITY: WITHIN THE PAST 12 MONTHS, YOU WORRIED THAT YOUR FOOD WOULD RUN OUT BEFORE YOU GOT MONEY TO BUY MORE.: NEVER TRUE

## 2024-03-26 SDOH — ECONOMIC STABILITY: FOOD INSECURITY: WITHIN THE PAST 12 MONTHS, THE FOOD YOU BOUGHT JUST DIDN'T LAST AND YOU DIDN'T HAVE MONEY TO GET MORE.: NEVER TRUE

## 2024-03-26 ASSESSMENT — ENCOUNTER SYMPTOMS
SORE THROAT: 0
RHINORRHEA: 0
CHEST TIGHTNESS: 0
SHORTNESS OF BREATH: 0
ABDOMINAL PAIN: 0
CONSTIPATION: 0
VOMITING: 0
EYE PAIN: 0
BACK PAIN: 1
COUGH: 0
BLOOD IN STOOL: 0
NAUSEA: 0
DIARRHEA: 0

## 2024-03-26 NOTE — PROGRESS NOTES
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  - s/p partial thyroidectomy  - reviewed path - Hurthle cell  - monitor labs   - increased to 75mcg (1/2020)  - labs lab (3/2024) - Stable     Postoperative hypothyroidism  - s/p partial thyroidectomy  - on synthroid  - monitor labs   - last lab (3/2024) - Stable     Impaired fasting glucose  - watch diet  - follow A1c 5.6 (3/2024)     Vitamin D deficiency  - on otc supplement   - stable     Presence of right artificial knee joint    Long term current use of therapeutic drug  - Chronic PPI therapy   - follow bone and B12     Dyspnea, unspecified type  - uncertain if deconditioning and anxiety and back pain   - has seen cardio stress negative     Return in about 1 month (around 4/26/2024) for check up and review.    No orders of the defined types were placed in this encounter.    Requested Prescriptions     Signed Prescriptions Disp Refills    HYDROcodone-acetaminophen (NORCO) 5-325 MG per tablet 60 tablet 0     Sig: Take 1 tablet by mouth 2 times daily as needed for Pain for up to 30 days. Max Daily Amount: 2 tablets     Randall Tran MD  3/26/2024  1:51 PM

## 2024-04-23 ENCOUNTER — OFFICE VISIT (OUTPATIENT)
Dept: FAMILY MEDICINE CLINIC | Age: 89
End: 2024-04-23
Payer: COMMERCIAL

## 2024-04-23 VITALS
DIASTOLIC BLOOD PRESSURE: 60 MMHG | RESPIRATION RATE: 22 BRPM | HEART RATE: 90 BPM | OXYGEN SATURATION: 96 % | HEIGHT: 63 IN | WEIGHT: 157 LBS | BODY MASS INDEX: 27.82 KG/M2 | SYSTOLIC BLOOD PRESSURE: 136 MMHG | TEMPERATURE: 97.7 F

## 2024-04-23 DIAGNOSIS — I49.9 CARDIAC ARRHYTHMIA, UNSPECIFIED CARDIAC ARRHYTHMIA TYPE: ICD-10-CM

## 2024-04-23 DIAGNOSIS — M54.50 CHRONIC BILATERAL LOW BACK PAIN WITHOUT SCIATICA: ICD-10-CM

## 2024-04-23 DIAGNOSIS — M15.9 GENERALIZED OSTEOARTHRITIS: Primary | ICD-10-CM

## 2024-04-23 DIAGNOSIS — N18.32 STAGE 3B CHRONIC KIDNEY DISEASE (HCC): ICD-10-CM

## 2024-04-23 DIAGNOSIS — E78.2 MIXED HYPERLIPIDEMIA: ICD-10-CM

## 2024-04-23 DIAGNOSIS — G89.29 CHRONIC BILATERAL LOW BACK PAIN WITHOUT SCIATICA: ICD-10-CM

## 2024-04-23 DIAGNOSIS — N18.31 STAGE 3A CHRONIC KIDNEY DISEASE (HCC): ICD-10-CM

## 2024-04-23 DIAGNOSIS — R01.1 MURMUR, CARDIAC: ICD-10-CM

## 2024-04-23 DIAGNOSIS — I10 ESSENTIAL HYPERTENSION: ICD-10-CM

## 2024-04-23 DIAGNOSIS — D34 HURTHLE CELL TUMOR: ICD-10-CM

## 2024-04-23 DIAGNOSIS — R73.01 IMPAIRED FASTING GLUCOSE: ICD-10-CM

## 2024-04-23 DIAGNOSIS — F32.0 CURRENT MILD EPISODE OF MAJOR DEPRESSIVE DISORDER, UNSPECIFIED WHETHER RECURRENT (HCC): ICD-10-CM

## 2024-04-23 DIAGNOSIS — E89.0 POSTOPERATIVE HYPOTHYROIDISM: ICD-10-CM

## 2024-04-23 DIAGNOSIS — K21.9 GASTROESOPHAGEAL REFLUX DISEASE WITHOUT ESOPHAGITIS: ICD-10-CM

## 2024-04-23 PROCEDURE — 99213 OFFICE O/P EST LOW 20 MIN: CPT | Performed by: INTERNAL MEDICINE

## 2024-04-23 PROCEDURE — 1123F ACP DISCUSS/DSCN MKR DOCD: CPT | Performed by: INTERNAL MEDICINE

## 2024-04-23 RX ORDER — HYDROCODONE BITARTRATE AND ACETAMINOPHEN 5; 325 MG/1; MG/1
1 TABLET ORAL 2 TIMES DAILY PRN
Qty: 60 TABLET | Refills: 0 | Status: SHIPPED | OUTPATIENT
Start: 2024-04-23 | End: 2024-05-23

## 2024-04-23 ASSESSMENT — ENCOUNTER SYMPTOMS
COUGH: 0
BLOOD IN STOOL: 0
SHORTNESS OF BREATH: 0
SORE THROAT: 0
ABDOMINAL PAIN: 0
BACK PAIN: 1
EYE PAIN: 0
CHEST TIGHTNESS: 0
RHINORRHEA: 0
VOMITING: 0
CONSTIPATION: 0
DIARRHEA: 0
NAUSEA: 0

## 2024-04-23 NOTE — PROGRESS NOTES
[facility] Physicians   Internal Medicine     2024  Briana Mosqueda : 1933 Sex: female  Age:89 y.o.    Chief Complaint   Patient presents with    Medication Refill    Chronic Pain    Follow-up        HPI:   Patient presents to office for evaluation for the following medical concerns.    - States having some back discomfort.. States ache that is constant. Improved if sits or lies down. Worse with walking and activity. States located to lower mid back. No radiation.  No numbness, tingling. States occastional weakness in legs. Following with pain management. States has had () - median nerve branch block L4-5 and L5-S1 () - RFA of median branches of rt l4-5 and l5-s1. States taking hydrocodone daily, No reported side effects some occasional dizzy. States symptoms are worsening. States had epidural (2022) no reports at present.  () - op b/l L5-S1 transforaminal epidural steroid injection.  Last visit per reviewed report (). States last injection (2023) have helped.  Change pain medication to twice a day (2023). States does not always takes 2 doses, no reported side effects. Helping symptoms 2024.     - Still reports pain to the left shoulder.  The patient states the pain has been present chronically but pain has worsened recently. States located in the shoulder and pain in upper arm. No swelling. No erythema. Had seen ortho. Was given an injection.  xr cervical spine () -severe multilevel degenerative disc diseaseStates unchanged. op () -cervical epidural steroid injection C7-T1.  () -cervical epidural steroid injection at C7-T1, states may be helping. Still with pain 2024, stable     - States labs showed renal insuffiencey. Was not sure if related to new medications or increased blood pressure or both. Has seen nephrology. Last visit with nephrology per reviewed note  () -CKD 3, hypertension continue current treatment follow-up 12 months with labs.

## 2024-05-20 ENCOUNTER — OFFICE VISIT (OUTPATIENT)
Dept: CARDIOLOGY CLINIC | Age: 89
End: 2024-05-20
Payer: COMMERCIAL

## 2024-05-20 VITALS
RESPIRATION RATE: 18 BRPM | HEIGHT: 63 IN | BODY MASS INDEX: 27.82 KG/M2 | WEIGHT: 157 LBS | DIASTOLIC BLOOD PRESSURE: 70 MMHG | SYSTOLIC BLOOD PRESSURE: 144 MMHG | HEART RATE: 71 BPM

## 2024-05-20 DIAGNOSIS — I10 ESSENTIAL HYPERTENSION: Primary | ICD-10-CM

## 2024-05-20 PROCEDURE — 99214 OFFICE O/P EST MOD 30 MIN: CPT | Performed by: INTERNAL MEDICINE

## 2024-05-20 PROCEDURE — 1123F ACP DISCUSS/DSCN MKR DOCD: CPT | Performed by: INTERNAL MEDICINE

## 2024-05-20 PROCEDURE — 93000 ELECTROCARDIOGRAM COMPLETE: CPT | Performed by: INTERNAL MEDICINE

## 2024-05-20 RX ORDER — LANOLIN ALCOHOL/MO/W.PET/CERES
50 CREAM (GRAM) TOPICAL DAILY
COMMUNITY

## 2024-05-20 NOTE — PROGRESS NOTES
whether recurrent (HCC)    Stage 3b chronic kidney disease (HCC)    Opioid dependence with current use (HCC)     1. SHERWOOD:     Echo normal 7/2021.      Stress low risk 10/14/2022.    2. HTN: Observe.    3. Lipids: Statin.     4. Hypothyroid: On HRT.     5. DJD: Per PCP.     Available external charts reviewed.   Available imaging and evaluations independently reviewed.   Interviewed and discussed patient with available family.    Sandeep Schultz D.O.  Cardiologist  Cardiology, [facility]

## 2024-05-23 ENCOUNTER — OFFICE VISIT (OUTPATIENT)
Dept: FAMILY MEDICINE CLINIC | Age: 89
End: 2024-05-23
Payer: COMMERCIAL

## 2024-05-23 VITALS
BODY MASS INDEX: 27.64 KG/M2 | TEMPERATURE: 98.8 F | OXYGEN SATURATION: 95 % | HEART RATE: 84 BPM | WEIGHT: 156 LBS | SYSTOLIC BLOOD PRESSURE: 136 MMHG | RESPIRATION RATE: 18 BRPM | DIASTOLIC BLOOD PRESSURE: 60 MMHG | HEIGHT: 63 IN

## 2024-05-23 DIAGNOSIS — M54.50 CHRONIC BILATERAL LOW BACK PAIN WITHOUT SCIATICA: Primary | ICD-10-CM

## 2024-05-23 DIAGNOSIS — G89.29 CHRONIC BILATERAL LOW BACK PAIN WITHOUT SCIATICA: Primary | ICD-10-CM

## 2024-05-23 DIAGNOSIS — M15.9 GENERALIZED OSTEOARTHRITIS: ICD-10-CM

## 2024-05-23 PROCEDURE — 1123F ACP DISCUSS/DSCN MKR DOCD: CPT | Performed by: INTERNAL MEDICINE

## 2024-05-23 PROCEDURE — 99213 OFFICE O/P EST LOW 20 MIN: CPT | Performed by: INTERNAL MEDICINE

## 2024-05-23 RX ORDER — HYDROCODONE BITARTRATE AND ACETAMINOPHEN 5; 325 MG/1; MG/1
1 TABLET ORAL 2 TIMES DAILY PRN
Qty: 60 TABLET | Refills: 0 | Status: SHIPPED | OUTPATIENT
Start: 2024-05-23 | End: 2024-06-22

## 2024-05-23 ASSESSMENT — ENCOUNTER SYMPTOMS
CHEST TIGHTNESS: 0
BLOOD IN STOOL: 0
ABDOMINAL PAIN: 0
DIARRHEA: 0
COUGH: 0
BACK PAIN: 1
VOMITING: 0
SHORTNESS OF BREATH: 0
NAUSEA: 0
RHINORRHEA: 0
EYE PAIN: 0
SORE THROAT: 0
CONSTIPATION: 0

## 2024-05-23 NOTE — PROGRESS NOTES
[facility] Physicians   Internal Medicine     2024  Briana Mosqueda : 1933 Sex: female  Age:89 y.o.    Chief Complaint   Patient presents with    Medication Refill    Chronic Pain    Follow-up        HPI:   Patient presents to office for evaluation for the following medical concerns.    - States having some back discomfort.. States ache that is constant. Improved if sits or lies down. Worse with walking and activity. States located to lower mid back. No radiation.  No numbness, tingling. States occastional weakness in legs. Following with pain management. States has had () - median nerve branch block L4-5 and L5-S1 () - RFA of median branches of rt l4-5 and l5-s1. States taking hydrocodone daily, No reported side effects some occasional dizzy. States symptoms are worsening. States had epidural (2022) no reports at present.  () - op b/l L5-S1 transforaminal epidural steroid injection.  Last visit per reviewed report (). States last injection (2023) have helped.  Change pain medication to twice a day (2023). States does not always takes 2 doses, no reported side effects. Helping symptoms 2024.     - Still reports pain to the left shoulder.  The patient states the pain has been present chronically but pain has worsened recently. States located in the shoulder and pain in upper arm. No swelling. No erythema. Had seen ortho. Was given an injection.  xr cervical spine () -severe multilevel degenerative disc diseaseStates unchanged. op () -cervical epidural steroid injection C7-T1.  () -cervical epidural steroid injection at C7-T1, states may be helping. Still with pain 2024, stable     - States labs showed renal insuffiencey. Was not sure if related to new medications or increased blood pressure or both. Has seen nephrology. Last visit with nephrology per reviewed note  () -CKD 3, hypertension continue current treatment follow-up 12 months with labs.

## 2024-06-18 ENCOUNTER — OFFICE VISIT (OUTPATIENT)
Dept: FAMILY MEDICINE CLINIC | Age: 89
End: 2024-06-18
Payer: COMMERCIAL

## 2024-06-18 VITALS
DIASTOLIC BLOOD PRESSURE: 58 MMHG | HEIGHT: 63 IN | HEART RATE: 81 BPM | RESPIRATION RATE: 18 BRPM | SYSTOLIC BLOOD PRESSURE: 128 MMHG | WEIGHT: 156 LBS | BODY MASS INDEX: 27.64 KG/M2 | TEMPERATURE: 99 F | OXYGEN SATURATION: 95 %

## 2024-06-18 DIAGNOSIS — F32.0 CURRENT MILD EPISODE OF MAJOR DEPRESSIVE DISORDER, UNSPECIFIED WHETHER RECURRENT (HCC): ICD-10-CM

## 2024-06-18 DIAGNOSIS — R73.01 IMPAIRED FASTING GLUCOSE: ICD-10-CM

## 2024-06-18 DIAGNOSIS — M54.50 CHRONIC BILATERAL LOW BACK PAIN WITHOUT SCIATICA: Primary | ICD-10-CM

## 2024-06-18 DIAGNOSIS — M15.9 GENERALIZED OSTEOARTHRITIS: ICD-10-CM

## 2024-06-18 DIAGNOSIS — E89.0 POSTOPERATIVE HYPOTHYROIDISM: ICD-10-CM

## 2024-06-18 DIAGNOSIS — I49.9 CARDIAC ARRHYTHMIA, UNSPECIFIED CARDIAC ARRHYTHMIA TYPE: ICD-10-CM

## 2024-06-18 DIAGNOSIS — E78.2 MIXED HYPERLIPIDEMIA: ICD-10-CM

## 2024-06-18 DIAGNOSIS — I10 ESSENTIAL HYPERTENSION: ICD-10-CM

## 2024-06-18 DIAGNOSIS — K21.9 GASTROESOPHAGEAL REFLUX DISEASE WITHOUT ESOPHAGITIS: ICD-10-CM

## 2024-06-18 DIAGNOSIS — G89.29 CHRONIC BILATERAL LOW BACK PAIN WITHOUT SCIATICA: Primary | ICD-10-CM

## 2024-06-18 DIAGNOSIS — R01.1 MURMUR, CARDIAC: ICD-10-CM

## 2024-06-18 DIAGNOSIS — N18.31 STAGE 3A CHRONIC KIDNEY DISEASE (HCC): ICD-10-CM

## 2024-06-18 PROCEDURE — 99213 OFFICE O/P EST LOW 20 MIN: CPT | Performed by: INTERNAL MEDICINE

## 2024-06-18 PROCEDURE — 1123F ACP DISCUSS/DSCN MKR DOCD: CPT | Performed by: INTERNAL MEDICINE

## 2024-06-18 RX ORDER — HYDROCODONE BITARTRATE AND ACETAMINOPHEN 5; 325 MG/1; MG/1
1 TABLET ORAL 2 TIMES DAILY PRN
Qty: 60 TABLET | Refills: 0 | Status: SHIPPED | OUTPATIENT
Start: 2024-06-18 | End: 2024-07-18

## 2024-06-18 ASSESSMENT — ENCOUNTER SYMPTOMS
SORE THROAT: 0
CHEST TIGHTNESS: 0
RHINORRHEA: 0
EYE PAIN: 0
CONSTIPATION: 0
VOMITING: 0
BACK PAIN: 1
BLOOD IN STOOL: 0
NAUSEA: 0
ABDOMINAL PAIN: 0
COUGH: 0
DIARRHEA: 0
SHORTNESS OF BREATH: 0

## 2024-06-18 NOTE — PROGRESS NOTES
hypothyroidism  - s/p partial thyroidectomy  - on synthroid  - monitor labs   - last lab (3/2024) - Stable     Impaired fasting glucose  - watch diet  - follow A1c 5.6 (3/2024)     Vitamin D deficiency  - on otc supplement   - stable     Presence of right artificial knee joint    Long term current use of therapeutic drug  - Chronic PPI therapy   - follow bone and B12     Dyspnea, unspecified type  - uncertain if deconditioning and anxiety and back pain   - has seen cardio stress negative     Return in about 1 month (around 7/18/2024) for check up and review.    No orders of the defined types were placed in this encounter.    Requested Prescriptions     Signed Prescriptions Disp Refills    HYDROcodone-acetaminophen (NORCO) 5-325 MG per tablet 60 tablet 0     Sig: Take 1 tablet by mouth 2 times daily as needed for Pain for up to 30 days. Max Daily Amount: 2 tablets     Randall Tran MD  6/18/2024  1:43 PM

## 2024-07-15 ENCOUNTER — OFFICE VISIT (OUTPATIENT)
Dept: FAMILY MEDICINE CLINIC | Age: 89
End: 2024-07-15
Payer: COMMERCIAL

## 2024-07-15 VITALS
BODY MASS INDEX: 27.63 KG/M2 | WEIGHT: 156 LBS | SYSTOLIC BLOOD PRESSURE: 130 MMHG | DIASTOLIC BLOOD PRESSURE: 60 MMHG | HEART RATE: 73 BPM | TEMPERATURE: 98.8 F | OXYGEN SATURATION: 95 %

## 2024-07-15 DIAGNOSIS — R73.01 IMPAIRED FASTING GLUCOSE: ICD-10-CM

## 2024-07-15 DIAGNOSIS — M15.9 GENERALIZED OSTEOARTHRITIS: ICD-10-CM

## 2024-07-15 DIAGNOSIS — I10 ESSENTIAL HYPERTENSION: ICD-10-CM

## 2024-07-15 DIAGNOSIS — R01.1 MURMUR, CARDIAC: ICD-10-CM

## 2024-07-15 DIAGNOSIS — N39.3 STRESS INCONTINENCE OF URINE: ICD-10-CM

## 2024-07-15 DIAGNOSIS — F32.0 CURRENT MILD EPISODE OF MAJOR DEPRESSIVE DISORDER, UNSPECIFIED WHETHER RECURRENT (HCC): Chronic | ICD-10-CM

## 2024-07-15 DIAGNOSIS — G89.29 CHRONIC BILATERAL LOW BACK PAIN WITHOUT SCIATICA: Primary | ICD-10-CM

## 2024-07-15 DIAGNOSIS — E78.2 MIXED HYPERLIPIDEMIA: Chronic | ICD-10-CM

## 2024-07-15 DIAGNOSIS — K21.9 GASTROESOPHAGEAL REFLUX DISEASE WITHOUT ESOPHAGITIS: Chronic | ICD-10-CM

## 2024-07-15 DIAGNOSIS — I49.9 CARDIAC ARRHYTHMIA, UNSPECIFIED CARDIAC ARRHYTHMIA TYPE: ICD-10-CM

## 2024-07-15 DIAGNOSIS — M54.50 CHRONIC BILATERAL LOW BACK PAIN WITHOUT SCIATICA: Primary | ICD-10-CM

## 2024-07-15 DIAGNOSIS — E89.0 POSTOPERATIVE HYPOTHYROIDISM: ICD-10-CM

## 2024-07-15 PROCEDURE — 99214 OFFICE O/P EST MOD 30 MIN: CPT | Performed by: INTERNAL MEDICINE

## 2024-07-15 PROCEDURE — 1123F ACP DISCUSS/DSCN MKR DOCD: CPT | Performed by: INTERNAL MEDICINE

## 2024-07-15 RX ORDER — OMEPRAZOLE 40 MG/1
40 CAPSULE, DELAYED RELEASE ORAL DAILY
Qty: 90 CAPSULE | Refills: 3 | Status: SHIPPED | OUTPATIENT
Start: 2024-07-15

## 2024-07-15 RX ORDER — SERTRALINE HYDROCHLORIDE 100 MG/1
100 TABLET, FILM COATED ORAL DAILY
Qty: 90 TABLET | Refills: 3 | Status: SHIPPED | OUTPATIENT
Start: 2024-07-15

## 2024-07-15 RX ORDER — SERTRALINE HYDROCHLORIDE 25 MG/1
25 TABLET, FILM COATED ORAL DAILY
Qty: 90 TABLET | Refills: 3 | Status: SHIPPED | OUTPATIENT
Start: 2024-07-15

## 2024-07-15 RX ORDER — HYDROCODONE BITARTRATE AND ACETAMINOPHEN 5; 325 MG/1; MG/1
1 TABLET ORAL 2 TIMES DAILY PRN
Qty: 60 TABLET | Refills: 0 | Status: SHIPPED | OUTPATIENT
Start: 2024-07-15 | End: 2024-08-14

## 2024-07-15 RX ORDER — LISINOPRIL 10 MG/1
10 TABLET ORAL DAILY
Qty: 90 TABLET | Refills: 3 | Status: SHIPPED | OUTPATIENT
Start: 2024-07-15

## 2024-07-15 RX ORDER — TERAZOSIN 2 MG/1
2 CAPSULE ORAL NIGHTLY
Qty: 90 CAPSULE | Refills: 3 | Status: SHIPPED | OUTPATIENT
Start: 2024-07-15

## 2024-07-15 RX ORDER — ATORVASTATIN CALCIUM 20 MG/1
20 TABLET, FILM COATED ORAL DAILY
Qty: 90 TABLET | Refills: 3 | Status: SHIPPED | OUTPATIENT
Start: 2024-07-15

## 2024-07-15 RX ORDER — LEVOTHYROXINE SODIUM 0.07 MG/1
75 TABLET ORAL DAILY
Qty: 90 TABLET | Refills: 3 | Status: SHIPPED | OUTPATIENT
Start: 2024-07-15

## 2024-07-15 RX ORDER — VERAPAMIL HYDROCHLORIDE 240 MG/1
240 TABLET, FILM COATED, EXTENDED RELEASE ORAL DAILY
Qty: 90 TABLET | Refills: 3 | Status: SHIPPED | OUTPATIENT
Start: 2024-07-15

## 2024-07-15 ASSESSMENT — ENCOUNTER SYMPTOMS
BLOOD IN STOOL: 0
ABDOMINAL PAIN: 0
SHORTNESS OF BREATH: 0
VOMITING: 0
CHEST TIGHTNESS: 0
SORE THROAT: 0
CONSTIPATION: 0
DIARRHEA: 0
EYE PAIN: 0
RHINORRHEA: 0
NAUSEA: 0
BACK PAIN: 1
COUGH: 0

## 2024-07-15 NOTE — PROGRESS NOTES
[facility] Physicians   Internal Medicine     7/15/2024  Briana Mosqueda : 1933 Sex: female  Age:89 y.o.    Chief Complaint   Patient presents with    Follow-up     Medication refill        HPI:   Patient presents to office for evaluation for the following medical concerns.    - States having some back discomfort.. States ache that is constant. Improved if sits or lies down. Worse with walking and activity. States located to lower mid back. No radiation.  No numbness, tingling. States occastional weakness in legs. Following with pain management. States has had () - median nerve branch block L4-5 and L5-S1 () - RFA of median branches of rt l4-5 and l5-s1. States taking hydrocodone daily, No reported side effects some occasional dizzy. States symptoms are worsening. States had epidural (2022) no reports at present.  () - op b/l L5-S1 transforaminal epidural steroid injection.  Last visit per reviewed report (). States last injection (2023) have helped.  Changed pain medication to twice a day (2023). States does not always takes 2 doses, no reported side effects. Helping symptoms 7/15/2024. States may follow up with pain management to inquire about follow up injections     - Still reports pain to the left shoulder.  The patient states the pain has been present chronically but pain has worsened recently. States located in the shoulder and pain in upper arm. No swelling. No erythema. Had seen ortho. Was given an injection.  xr cervical spine () -severe multilevel degenerative disc diseaseStates unchanged. op () -cervical epidural steroid injection C7-T1.  () -cervical epidural steroid injection at C7-T1, states helped. Still with pain 7/15/2024, stable     - States labs showed renal insuffiencey. Was not sure if related to new medications or increased blood pressure or both. Has seen nephrology. Last visit with nephrology per reviewed note  () -CKD 3, hypertension

## 2024-08-15 DIAGNOSIS — M15.9 GENERALIZED OSTEOARTHRITIS: ICD-10-CM

## 2024-08-15 RX ORDER — HYDROCODONE BITARTRATE AND ACETAMINOPHEN 5; 325 MG/1; MG/1
1 TABLET ORAL 2 TIMES DAILY PRN
Qty: 60 TABLET | Refills: 0 | Status: SHIPPED | OUTPATIENT
Start: 2024-08-15 | End: 2024-09-14

## 2024-08-15 NOTE — TELEPHONE ENCOUNTER
Controlled Substance Monitoring:    Acute and Chronic Pain Monitoring:   RX Monitoring Periodic Controlled Substance Monitoring   8/15/2024   4:10 PM No signs of potential drug abuse or diversion identified.     Requested Prescriptions     Pending Prescriptions Disp Refills    HYDROcodone-acetaminophen (NORCO) 5-325 MG per tablet 60 tablet 0     Sig: Take 1 tablet by mouth 2 times daily as needed for Pain for up to 30 days. Max Daily Amount: 2 tablets     I sent the Rx to the pharmacy.    Electronically signed by Randall Tran MD on 8/15/2024 at 4:10 PM

## 2024-08-15 NOTE — TELEPHONE ENCOUNTER
Patient called in apologizing for missing appointment on 8/12/24.  Patient stated she did not know how she had mixed up her appointment.  Patient requesting refill on [place].  Patient is going out of town on Tuesday.  I offered her an appointment in [place] for tomorrow.  Patient stated she prefers the [place] office.  Patient stated it takes 2 days for her pharmacy to fill the medication.  If she comes to appointment tomorrow her Rx would not be ready by the time she leaves for her vacation.  Patient is scheduled for an appointment next month.  Please advise.  Order pending.     Last Appointment:  7/15/2024  Future Appointments   Date Time Provider Department Center   9/12/2024  3:00 PM Randall Tran MD [facility]

## 2024-09-12 ENCOUNTER — OFFICE VISIT (OUTPATIENT)
Dept: FAMILY MEDICINE CLINIC | Age: 89
End: 2024-09-12

## 2024-09-12 VITALS
WEIGHT: 155 LBS | DIASTOLIC BLOOD PRESSURE: 60 MMHG | BODY MASS INDEX: 27.46 KG/M2 | SYSTOLIC BLOOD PRESSURE: 116 MMHG | OXYGEN SATURATION: 96 % | RESPIRATION RATE: 18 BRPM | TEMPERATURE: 98.1 F | HEART RATE: 99 BPM | HEIGHT: 63 IN

## 2024-09-12 DIAGNOSIS — G89.29 CHRONIC BILATERAL LOW BACK PAIN WITHOUT SCIATICA: ICD-10-CM

## 2024-09-12 DIAGNOSIS — F32.0 CURRENT MILD EPISODE OF MAJOR DEPRESSIVE DISORDER, UNSPECIFIED WHETHER RECURRENT (HCC): ICD-10-CM

## 2024-09-12 DIAGNOSIS — Z23 NEED FOR INFLUENZA VACCINATION: Primary | ICD-10-CM

## 2024-09-12 DIAGNOSIS — K21.9 GASTROESOPHAGEAL REFLUX DISEASE WITHOUT ESOPHAGITIS: ICD-10-CM

## 2024-09-12 DIAGNOSIS — M54.50 CHRONIC BILATERAL LOW BACK PAIN WITHOUT SCIATICA: ICD-10-CM

## 2024-09-12 DIAGNOSIS — I49.9 CARDIAC ARRHYTHMIA, UNSPECIFIED CARDIAC ARRHYTHMIA TYPE: ICD-10-CM

## 2024-09-12 DIAGNOSIS — M15.9 GENERALIZED OSTEOARTHRITIS: ICD-10-CM

## 2024-09-12 DIAGNOSIS — E78.2 MIXED HYPERLIPIDEMIA: ICD-10-CM

## 2024-09-12 DIAGNOSIS — E89.0 POSTOPERATIVE HYPOTHYROIDISM: ICD-10-CM

## 2024-09-12 DIAGNOSIS — D34 HURTHLE CELL TUMOR: ICD-10-CM

## 2024-09-12 DIAGNOSIS — N18.31 STAGE 3A CHRONIC KIDNEY DISEASE (HCC): ICD-10-CM

## 2024-09-12 DIAGNOSIS — R01.1 MURMUR, CARDIAC: ICD-10-CM

## 2024-09-12 DIAGNOSIS — I10 ESSENTIAL HYPERTENSION: ICD-10-CM

## 2024-09-12 DIAGNOSIS — Z79.899 LONG TERM CURRENT USE OF THERAPEUTIC DRUG: ICD-10-CM

## 2024-09-12 DIAGNOSIS — N39.3 STRESS INCONTINENCE OF URINE: ICD-10-CM

## 2024-09-12 DIAGNOSIS — R73.01 IMPAIRED FASTING GLUCOSE: ICD-10-CM

## 2024-09-12 RX ORDER — HYDROCODONE BITARTRATE AND ACETAMINOPHEN 5; 325 MG/1; MG/1
1 TABLET ORAL 2 TIMES DAILY PRN
Qty: 60 TABLET | Refills: 0 | Status: SHIPPED | OUTPATIENT
Start: 2024-09-12 | End: 2024-10-12

## 2024-09-12 ASSESSMENT — ENCOUNTER SYMPTOMS
CHEST TIGHTNESS: 0
SHORTNESS OF BREATH: 0
VOMITING: 0
BACK PAIN: 1
NAUSEA: 0
BLOOD IN STOOL: 0
SORE THROAT: 0
CONSTIPATION: 0
EYE PAIN: 0
DIARRHEA: 0
COUGH: 0
RHINORRHEA: 0
ABDOMINAL PAIN: 0

## 2024-10-02 DIAGNOSIS — M15.9 GENERALIZED OSTEOARTHRITIS: ICD-10-CM

## 2024-10-02 DIAGNOSIS — E78.2 MIXED HYPERLIPIDEMIA: ICD-10-CM

## 2024-10-02 DIAGNOSIS — G89.29 CHRONIC BILATERAL LOW BACK PAIN WITHOUT SCIATICA: ICD-10-CM

## 2024-10-02 DIAGNOSIS — E89.0 POSTOPERATIVE HYPOTHYROIDISM: ICD-10-CM

## 2024-10-02 DIAGNOSIS — R73.01 IMPAIRED FASTING GLUCOSE: ICD-10-CM

## 2024-10-02 DIAGNOSIS — Z79.899 LONG TERM CURRENT USE OF THERAPEUTIC DRUG: ICD-10-CM

## 2024-10-02 DIAGNOSIS — M54.50 CHRONIC BILATERAL LOW BACK PAIN WITHOUT SCIATICA: ICD-10-CM

## 2024-10-02 DIAGNOSIS — N18.31 STAGE 3A CHRONIC KIDNEY DISEASE (HCC): ICD-10-CM

## 2024-10-02 LAB
ALBUMIN: 4.2 G/DL (ref 3.5–5.2)
ALP BLD-CCNC: 104 U/L (ref 35–104)
ALT SERPL-CCNC: 12 U/L (ref 0–32)
ANION GAP SERPL CALCULATED.3IONS-SCNC: 13 MMOL/L (ref 7–16)
AST SERPL-CCNC: 20 U/L (ref 0–31)
BACTERIA: ABNORMAL
BASOPHILS ABSOLUTE: 0.04 K/UL (ref 0–0.2)
BASOPHILS RELATIVE PERCENT: 1 % (ref 0–2)
BILIRUB SERPL-MCNC: 0.7 MG/DL (ref 0–1.2)
BILIRUBIN, URINE: NEGATIVE
BUN BLDV-MCNC: 20 MG/DL (ref 6–23)
CALCIUM SERPL-MCNC: 9.5 MG/DL (ref 8.6–10.2)
CHLORIDE BLD-SCNC: 104 MMOL/L (ref 98–107)
CHOLESTEROL, FASTING: 184 MG/DL
CO2: 23 MMOL/L (ref 22–29)
COLOR, UA: ABNORMAL
CREAT SERPL-MCNC: 1 MG/DL (ref 0.5–1)
CREATININE URINE: 284 MG/DL (ref 29–226)
CRYSTALS, UA: ABNORMAL /HPF
EOSINOPHILS ABSOLUTE: 0.06 K/UL (ref 0.05–0.5)
EOSINOPHILS RELATIVE PERCENT: 1 % (ref 0–6)
EPITHELIAL CELLS, UA: ABNORMAL /HPF
FOLATE: >20 NG/ML (ref 4.8–24.2)
GFR, ESTIMATED: 55 ML/MIN/1.73M2
GLUCOSE BLD-MCNC: 112 MG/DL (ref 74–99)
GLUCOSE URINE: NEGATIVE MG/DL
HBA1C MFR BLD: 5.5 % (ref 4–5.6)
HCT VFR BLD CALC: 39.4 % (ref 34–48)
HDLC SERPL-MCNC: 69 MG/DL
HEMOGLOBIN: 12.6 G/DL (ref 11.5–15.5)
IMMATURE GRANULOCYTES %: 0 % (ref 0–5)
IMMATURE GRANULOCYTES ABSOLUTE: <0.03 K/UL (ref 0–0.58)
KETONES, URINE: ABNORMAL MG/DL
LDL CHOLESTEROL: 91 MG/DL
LEUKOCYTE ESTERASE, URINE: ABNORMAL
LYMPHOCYTES ABSOLUTE: 1.1 K/UL (ref 1.5–4)
LYMPHOCYTES RELATIVE PERCENT: 16 % (ref 20–42)
MAGNESIUM: 1.9 MG/DL (ref 1.6–2.6)
MCH RBC QN AUTO: 31.5 PG (ref 26–35)
MCHC RBC AUTO-ENTMCNC: 32 G/DL (ref 32–34.5)
MCV RBC AUTO: 98.5 FL (ref 80–99.9)
MICROALBUMIN/CREAT 24H UR: 22 MG/L (ref 0–19)
MICROALBUMIN/CREAT UR-RTO: 8 MCG/MG CREAT (ref 0–30)
MONOCYTES ABSOLUTE: 0.46 K/UL (ref 0.1–0.95)
MONOCYTES RELATIVE PERCENT: 7 % (ref 2–12)
MUCUS: PRESENT
NEUTROPHILS ABSOLUTE: 5.31 K/UL (ref 1.8–7.3)
NEUTROPHILS RELATIVE PERCENT: 76 % (ref 43–80)
NITRITE, URINE: NEGATIVE
PDW BLD-RTO: 12.6 % (ref 11.5–15)
PH, URINE: 5.5 (ref 5–9)
PLATELET # BLD: 188 K/UL (ref 130–450)
PMV BLD AUTO: 11.7 FL (ref 7–12)
POTASSIUM SERPL-SCNC: 4.3 MMOL/L (ref 3.5–5)
PROTEIN UA: NEGATIVE MG/DL
RBC # BLD: 4 M/UL (ref 3.5–5.5)
RBC UA: ABNORMAL /HPF
SODIUM BLD-SCNC: 140 MMOL/L (ref 132–146)
SPECIFIC GRAVITY UA: 1.02 (ref 1–1.03)
T4 FREE: 1.3 NG/DL (ref 0.9–1.7)
TOTAL PROTEIN: 6.4 G/DL (ref 6.4–8.3)
TRIGLYCERIDE, FASTING: 120 MG/DL
TSH SERPL DL<=0.05 MIU/L-ACNC: 1.55 UIU/ML (ref 0.27–4.2)
TURBIDITY: ABNORMAL
URINE HGB: NEGATIVE
UROBILINOGEN, URINE: 0.2 EU/DL (ref 0–1)
VITAMIN B-12: 1363 PG/ML (ref 211–946)
VITAMIN D 25-HYDROXY: 54.1 NG/ML (ref 30–100)
VLDLC SERPL CALC-MCNC: 24 MG/DL
WBC # BLD: 7 K/UL (ref 4.5–11.5)
WBC UA: ABNORMAL /HPF

## 2024-10-03 LAB
6-MAM, QUANTITATIVE, URINE: <10 NG/ML
6-MONOACETYLMORPHINE, URINE: NEGATIVE
ABNORMAL SPECIMEN VALIDITY TEST: ABNORMAL
ALCOHOL URINE: NOT DETECTED MG/DL
AMPHETAMINE SCREEN URINE: NEGATIVE
BARBITURATE SCREEN URINE: NEGATIVE
BENZODIAZEPINE SCREEN, URINE: NEGATIVE
BUPRENORPHINE URINE: NEGATIVE
CANNABINOID SCREEN URINE: NEGATIVE
COCAINE METABOLITE, URINE: NEGATIVE
CODEINE, QUANTITATIVE, URINE: <50 NG/ML
COMPLIANCE DRUG ANALYSIS, URINE: NORMAL
FENTANYL URINE: POSITIVE
FENTANYL, URN, QUANT: <5 NG/ML
HYDROCODONE, QUANTITATIVE, URINE: >1000 NG/ML
HYDROMORPHONE, QUANTITATIVE, URINE: 364.7 NG/ML
INTEGRITY CHECK, CREATININE, URINE: 271 MG/DL (ref 22–250)
INTEGRITY CHECK, OXIDANT, URINE: 96 MG/L
INTEGRITY CHECK, PH, URINE: 5.6 (ref 4.5–9)
INTEGRITY CHECK, SPECIFIC GRAVITY, URINE: 1.02 (ref 1–1.03)
METHADONE SCREEN, URINE: NEGATIVE
MORPHINE, QUANTITATIVE, URINE: <50 NG/ML
NORFENTANYL, URN, QUANT: <10 NG/ML
NORHYDROCODONE, QUANTITATIVE, URINE: >1000 NG/ML
NOROXYCODONE, QUANTITATIVE, URINE: <50 NG/ML
OPIATES, URINE: POSITIVE
OXYCODONE SCREEN URINE: NEGATIVE
OXYCODONE URINE, QUANTITATIVE: <50 NG/ML
OXYMORPHONE, QUANTITATIVE, URINE: <50 NG/ML
PCP,URINE: NEGATIVE
TEST INFORMATION: ABNORMAL
TRAMADOL, URINE: NEGATIVE

## 2024-10-10 ENCOUNTER — OFFICE VISIT (OUTPATIENT)
Dept: FAMILY MEDICINE CLINIC | Age: 89
End: 2024-10-10

## 2024-10-10 VITALS
WEIGHT: 155 LBS | BODY MASS INDEX: 27.46 KG/M2 | RESPIRATION RATE: 22 BRPM | OXYGEN SATURATION: 97 % | HEIGHT: 63 IN | HEART RATE: 70 BPM | TEMPERATURE: 98 F | DIASTOLIC BLOOD PRESSURE: 68 MMHG | SYSTOLIC BLOOD PRESSURE: 126 MMHG

## 2024-10-10 DIAGNOSIS — M15.9 GENERALIZED OSTEOARTHRITIS: ICD-10-CM

## 2024-10-10 DIAGNOSIS — F32.0 CURRENT MILD EPISODE OF MAJOR DEPRESSIVE DISORDER, UNSPECIFIED WHETHER RECURRENT (HCC): ICD-10-CM

## 2024-10-10 DIAGNOSIS — D34 HURTHLE CELL TUMOR: ICD-10-CM

## 2024-10-10 DIAGNOSIS — R73.01 IMPAIRED FASTING GLUCOSE: ICD-10-CM

## 2024-10-10 DIAGNOSIS — I10 ESSENTIAL HYPERTENSION: ICD-10-CM

## 2024-10-10 DIAGNOSIS — M54.50 CHRONIC BILATERAL LOW BACK PAIN WITHOUT SCIATICA: ICD-10-CM

## 2024-10-10 DIAGNOSIS — R01.1 MURMUR, CARDIAC: ICD-10-CM

## 2024-10-10 DIAGNOSIS — E89.0 POSTOPERATIVE HYPOTHYROIDISM: ICD-10-CM

## 2024-10-10 DIAGNOSIS — E78.2 MIXED HYPERLIPIDEMIA: ICD-10-CM

## 2024-10-10 DIAGNOSIS — N18.31 STAGE 3A CHRONIC KIDNEY DISEASE (HCC): ICD-10-CM

## 2024-10-10 DIAGNOSIS — N39.3 STRESS INCONTINENCE OF URINE: ICD-10-CM

## 2024-10-10 DIAGNOSIS — K21.9 GASTROESOPHAGEAL REFLUX DISEASE WITHOUT ESOPHAGITIS: Primary | ICD-10-CM

## 2024-10-10 DIAGNOSIS — I49.9 CARDIAC ARRHYTHMIA, UNSPECIFIED CARDIAC ARRHYTHMIA TYPE: ICD-10-CM

## 2024-10-10 DIAGNOSIS — G89.29 CHRONIC BILATERAL LOW BACK PAIN WITHOUT SCIATICA: ICD-10-CM

## 2024-10-10 RX ORDER — HYDROCODONE BITARTRATE AND ACETAMINOPHEN 5; 325 MG/1; MG/1
1 TABLET ORAL 2 TIMES DAILY PRN
Qty: 60 TABLET | Refills: 0 | Status: SHIPPED | OUTPATIENT
Start: 2024-10-10 | End: 2024-11-09

## 2024-10-10 ASSESSMENT — ENCOUNTER SYMPTOMS
VOMITING: 0
NAUSEA: 0
EYE PAIN: 0
CONSTIPATION: 0
COUGH: 0
DIARRHEA: 0
BACK PAIN: 1
SHORTNESS OF BREATH: 0
CHEST TIGHTNESS: 0
RHINORRHEA: 0
SORE THROAT: 0
BLOOD IN STOOL: 0
ABDOMINAL PAIN: 0

## 2024-10-10 NOTE — PROGRESS NOTES
[facility] Physicians   Internal Medicine     10/10/2024  Briana Mosqueda : 1933 Sex: female  Age:89 y.o.    Chief Complaint   Patient presents with    Follow-up    Medication Refill    Chronic Pain        HPI:   Patient presents to office for evaluation for the following medical concerns.    - States having some back discomfort.. States ache that is constant. Improved if sits or lies down. Worse with walking and activity. States located to lower mid back. No radiation.  No numbness, tingling. States occastional weakness in legs.   Has seen pain management.   - States has had () - median nerve branch block L4-5 and L5-S1   - () - RFA of median branches of rt l4-5 and l5-s1   - States had epidural (2022) no reports at present.    - () - op b/l L5-S1 transforaminal epidural steroid injection.    - States last injection (2023) have helped.    - States taking hydrocodone daily, No reported side effects some occasional dizzy. Changed pain medication to twice a day (2023). States does not always takes 2 doses, no reported side effects. Helping symptoms 10/10/2024.   - States may follow up with pain management to inquire about follow up injections     - Still reports pain to the left shoulder.  The patient states the pain has been present chronically but pain has worsened recently. States located in the shoulder and pain in upper arm. No swelling. No erythema. Had seen ortho. Was given an injection.  xr cervical spine () -severe multilevel degenerative disc diseaseStates unchanged. op () -cervical epidural steroid injection C7-T1.  () -cervical epidural steroid injection at C7-T1, states helped. Still with pain 10/10/2024, stable     - States labs showed renal insuffiencey. Was not sure if related to new medications or increased blood pressure or both. Has seen nephrology. Last visit with nephrology per reviewed note  () -CKD 3, hypertension continue current treatment

## 2024-11-04 ENCOUNTER — OFFICE VISIT (OUTPATIENT)
Dept: FAMILY MEDICINE CLINIC | Age: 89
End: 2024-11-04

## 2024-11-04 VITALS
HEART RATE: 85 BPM | RESPIRATION RATE: 18 BRPM | BODY MASS INDEX: 27.29 KG/M2 | OXYGEN SATURATION: 97 % | HEIGHT: 63 IN | DIASTOLIC BLOOD PRESSURE: 64 MMHG | SYSTOLIC BLOOD PRESSURE: 144 MMHG | TEMPERATURE: 98.1 F | WEIGHT: 154 LBS

## 2024-11-04 DIAGNOSIS — I10 ESSENTIAL HYPERTENSION: ICD-10-CM

## 2024-11-04 DIAGNOSIS — N18.31 STAGE 3A CHRONIC KIDNEY DISEASE (HCC): ICD-10-CM

## 2024-11-04 DIAGNOSIS — K21.9 GASTROESOPHAGEAL REFLUX DISEASE WITHOUT ESOPHAGITIS: Primary | ICD-10-CM

## 2024-11-04 DIAGNOSIS — I49.9 CARDIAC ARRHYTHMIA, UNSPECIFIED CARDIAC ARRHYTHMIA TYPE: ICD-10-CM

## 2024-11-04 DIAGNOSIS — E78.2 MIXED HYPERLIPIDEMIA: ICD-10-CM

## 2024-11-04 DIAGNOSIS — F32.0 CURRENT MILD EPISODE OF MAJOR DEPRESSIVE DISORDER, UNSPECIFIED WHETHER RECURRENT (HCC): ICD-10-CM

## 2024-11-04 DIAGNOSIS — R01.1 MURMUR, CARDIAC: ICD-10-CM

## 2024-11-04 DIAGNOSIS — G89.29 CHRONIC BILATERAL LOW BACK PAIN WITHOUT SCIATICA: ICD-10-CM

## 2024-11-04 DIAGNOSIS — M54.50 CHRONIC BILATERAL LOW BACK PAIN WITHOUT SCIATICA: ICD-10-CM

## 2024-11-04 DIAGNOSIS — M15.9 GENERALIZED OSTEOARTHRITIS: ICD-10-CM

## 2024-11-04 RX ORDER — HYDROCODONE BITARTRATE AND ACETAMINOPHEN 5; 325 MG/1; MG/1
1 TABLET ORAL 2 TIMES DAILY PRN
Qty: 60 TABLET | Refills: 0 | Status: SHIPPED | OUTPATIENT
Start: 2024-11-04 | End: 2024-12-04

## 2024-11-04 ASSESSMENT — ENCOUNTER SYMPTOMS
CONSTIPATION: 0
NAUSEA: 0
VOMITING: 0
COUGH: 0
DIARRHEA: 0
EYE PAIN: 0
ABDOMINAL PAIN: 0
CHEST TIGHTNESS: 0
SORE THROAT: 0
RHINORRHEA: 0
BACK PAIN: 1
BLOOD IN STOOL: 0
SHORTNESS OF BREATH: 0

## 2024-11-04 NOTE — PROGRESS NOTES
[facility] Physicians   Internal Medicine     2024  Briana Mosqueda : 1933 Sex: female  Age:89 y.o.    Chief Complaint   Patient presents with    Follow-up    Medication Refill    Chronic Pain        HPI:   Patient presents to office for evaluation for the following medical concerns.    - States having some back discomfort.. States ache that is constant. Improved if sits or lies down. Worse with walking and activity. States located to lower mid back. No radiation.  No numbness, tingling. States occastional weakness in legs.   Has seen pain management.   - States has had () - median nerve branch block L4-5 and L5-S1   - () - RFA of median branches of rt l4-5 and l5-s1   - States had epidural (2022) no reports at present.    - () - op b/l L5-S1 transforaminal epidural steroid injection.    - States last injection (2023) have helped.    - States taking hydrocodone daily, No reported side effects some occasional dizzy. Changed pain medication to twice a day. States does not always takes 2 doses, no reported side effects. Helping symptoms 2024.   - States may follow up with pain management to inquire about follow up injections   - unchanged 2024    - Still reports pain to the left shoulder.  The patient states the pain has been present chronically but pain has worsened recently. States located in the shoulder and pain in upper arm. No swelling. No erythema. Had seen ortho. Was given an injection.  xr cervical spine () -severe multilevel degenerative disc diseaseStates unchanged. op () -cervical epidural steroid injection C7-T1.  () -cervical epidural steroid injection at C7-T1, states helped. Still with pain 2024, unchanged stable     - States labs showed renal insuffiencey. Was not sure if related to new medications or increased blood pressure or both. Has seen nephrology. Last visit with nephrology per reviewed note  () -CKD 3, hypertension continue

## 2024-12-05 ENCOUNTER — OFFICE VISIT (OUTPATIENT)
Dept: FAMILY MEDICINE CLINIC | Age: 88
End: 2024-12-05

## 2024-12-05 VITALS
HEIGHT: 63 IN | OXYGEN SATURATION: 97 % | BODY MASS INDEX: 27.11 KG/M2 | WEIGHT: 153 LBS | SYSTOLIC BLOOD PRESSURE: 164 MMHG | DIASTOLIC BLOOD PRESSURE: 70 MMHG | HEART RATE: 67 BPM | TEMPERATURE: 98.1 F | RESPIRATION RATE: 20 BRPM

## 2024-12-05 DIAGNOSIS — I10 ESSENTIAL HYPERTENSION: ICD-10-CM

## 2024-12-05 DIAGNOSIS — D34 HURTHLE CELL TUMOR: ICD-10-CM

## 2024-12-05 DIAGNOSIS — M54.50 CHRONIC BILATERAL LOW BACK PAIN WITHOUT SCIATICA: ICD-10-CM

## 2024-12-05 DIAGNOSIS — N18.31 STAGE 3A CHRONIC KIDNEY DISEASE (HCC): ICD-10-CM

## 2024-12-05 DIAGNOSIS — I49.9 CARDIAC ARRHYTHMIA, UNSPECIFIED CARDIAC ARRHYTHMIA TYPE: ICD-10-CM

## 2024-12-05 DIAGNOSIS — G89.29 CHRONIC BILATERAL LOW BACK PAIN WITHOUT SCIATICA: ICD-10-CM

## 2024-12-05 DIAGNOSIS — R73.01 IMPAIRED FASTING GLUCOSE: ICD-10-CM

## 2024-12-05 DIAGNOSIS — E78.2 MIXED HYPERLIPIDEMIA: ICD-10-CM

## 2024-12-05 DIAGNOSIS — R01.1 MURMUR, CARDIAC: ICD-10-CM

## 2024-12-05 DIAGNOSIS — E89.0 POSTOPERATIVE HYPOTHYROIDISM: ICD-10-CM

## 2024-12-05 DIAGNOSIS — F32.0 CURRENT MILD EPISODE OF MAJOR DEPRESSIVE DISORDER, UNSPECIFIED WHETHER RECURRENT (HCC): ICD-10-CM

## 2024-12-05 DIAGNOSIS — K21.9 GASTROESOPHAGEAL REFLUX DISEASE WITHOUT ESOPHAGITIS: ICD-10-CM

## 2024-12-05 DIAGNOSIS — N39.3 STRESS INCONTINENCE OF URINE: ICD-10-CM

## 2024-12-05 DIAGNOSIS — M15.9 GENERALIZED OSTEOARTHRITIS: Primary | ICD-10-CM

## 2024-12-05 RX ORDER — HYDROCODONE BITARTRATE AND ACETAMINOPHEN 5; 325 MG/1; MG/1
1 TABLET ORAL 2 TIMES DAILY PRN
Qty: 60 TABLET | Refills: 0 | Status: SHIPPED | OUTPATIENT
Start: 2024-12-05 | End: 2025-01-04

## 2024-12-05 ASSESSMENT — ENCOUNTER SYMPTOMS
RHINORRHEA: 0
BLOOD IN STOOL: 0
COUGH: 0
EYE PAIN: 0
CONSTIPATION: 0
SHORTNESS OF BREATH: 0
DIARRHEA: 0
ABDOMINAL PAIN: 0
BACK PAIN: 1
VOMITING: 0
SORE THROAT: 0
CHEST TIGHTNESS: 0
NAUSEA: 0

## 2024-12-05 NOTE — PROGRESS NOTES
[facility] Phys[facility]   Internal Medicine     2024  Briana Mosqueda : 1933 Sex: female  Age:89 y.o.    Chief Complaint   Patient presents with    Medication Refill    Chronic Pain    Follow-up        HPI:   Patient presents to office for evaluation for the following medical concerns.    - States having some back discomfort.. States ache that is constant. Improved if sits or lies down. Worse with walking and activity. States located to lower mid back. No radiation.  No numbness, tingling. States occastional weakness in legs.   Has seen pain management.   - States has had () - median nerve branch block L4-5 and L5-S1   - () - RFA of median branches of rt l4-5 and l5-s1   - States had epidural (2022) no reports at present.    - () - op b/l L5-S1 transforaminal epidural steroid injection.    - States last injection (2023) have helped.    - States taking hydrocodone daily, No reported side effects some occasional dizzy. Changed pain medication to twice a day. States does not always takes 2 doses, no reported side effects. Helping symptoms 2024.   - States may follow up with pain management to inquire about follow up injections   - unchanged 2024    - Still reports pain to the left shoulder.  The patient states the pain has been present chronically but pain has worsened recently. States located in the shoulder and pain in upper arm. No swelling. No erythema. Had seen ortho. Was given an injection.  xr cervical spine () -severe multilevel degenerative disc diseaseStates unchanged. op () -cervical epidural steroid injection C7-T1.  () -cervical epidural steroid injection at C7-T1, states helped. Still with pain 2024, unchanged stable     - States labs showed renal insuffiencey. Was not sure if related to new medications or increased blood pressure or both. Has seen nephrology. Last visit with nephrology per reviewed note () -CKD 3 A,  hypertension, resolved

## 2025-01-07 ENCOUNTER — OFFICE VISIT (OUTPATIENT)
Dept: FAMILY MEDICINE CLINIC | Age: 89
End: 2025-01-07
Payer: MEDICARE

## 2025-01-07 VITALS
TEMPERATURE: 98 F | BODY MASS INDEX: 27.29 KG/M2 | SYSTOLIC BLOOD PRESSURE: 134 MMHG | HEIGHT: 63 IN | DIASTOLIC BLOOD PRESSURE: 64 MMHG | HEART RATE: 74 BPM | RESPIRATION RATE: 18 BRPM | OXYGEN SATURATION: 95 % | WEIGHT: 154 LBS

## 2025-01-07 VITALS
RESPIRATION RATE: 18 BRPM | TEMPERATURE: 98 F | OXYGEN SATURATION: 95 % | BODY MASS INDEX: 27.29 KG/M2 | DIASTOLIC BLOOD PRESSURE: 64 MMHG | WEIGHT: 154 LBS | HEIGHT: 63 IN | HEART RATE: 74 BPM | SYSTOLIC BLOOD PRESSURE: 134 MMHG

## 2025-01-07 DIAGNOSIS — E78.2 MIXED HYPERLIPIDEMIA: Chronic | ICD-10-CM

## 2025-01-07 DIAGNOSIS — D34 HURTHLE CELL TUMOR: ICD-10-CM

## 2025-01-07 DIAGNOSIS — N39.3 STRESS INCONTINENCE OF URINE: ICD-10-CM

## 2025-01-07 DIAGNOSIS — I49.9 CARDIAC ARRHYTHMIA, UNSPECIFIED CARDIAC ARRHYTHMIA TYPE: ICD-10-CM

## 2025-01-07 DIAGNOSIS — R01.1 MURMUR, CARDIAC: ICD-10-CM

## 2025-01-07 DIAGNOSIS — R73.01 IMPAIRED FASTING GLUCOSE: ICD-10-CM

## 2025-01-07 DIAGNOSIS — I10 ESSENTIAL HYPERTENSION: ICD-10-CM

## 2025-01-07 DIAGNOSIS — F32.0 CURRENT MILD EPISODE OF MAJOR DEPRESSIVE DISORDER, UNSPECIFIED WHETHER RECURRENT (HCC): Chronic | ICD-10-CM

## 2025-01-07 DIAGNOSIS — M54.50 CHRONIC BILATERAL LOW BACK PAIN WITHOUT SCIATICA: ICD-10-CM

## 2025-01-07 DIAGNOSIS — E89.0 POSTOPERATIVE HYPOTHYROIDISM: ICD-10-CM

## 2025-01-07 DIAGNOSIS — Z00.00 MEDICARE ANNUAL WELLNESS VISIT, SUBSEQUENT: Primary | ICD-10-CM

## 2025-01-07 DIAGNOSIS — G89.29 CHRONIC BILATERAL LOW BACK PAIN WITHOUT SCIATICA: ICD-10-CM

## 2025-01-07 DIAGNOSIS — K21.9 GASTROESOPHAGEAL REFLUX DISEASE WITHOUT ESOPHAGITIS: Chronic | ICD-10-CM

## 2025-01-07 DIAGNOSIS — N18.31 STAGE 3A CHRONIC KIDNEY DISEASE (HCC): ICD-10-CM

## 2025-01-07 DIAGNOSIS — M15.9 GENERALIZED OSTEOARTHRITIS: Primary | ICD-10-CM

## 2025-01-07 PROCEDURE — 1123F ACP DISCUSS/DSCN MKR DOCD: CPT | Performed by: INTERNAL MEDICINE

## 2025-01-07 PROCEDURE — G0439 PPPS, SUBSEQ VISIT: HCPCS | Performed by: INTERNAL MEDICINE

## 2025-01-07 PROCEDURE — 99214 OFFICE O/P EST MOD 30 MIN: CPT | Performed by: INTERNAL MEDICINE

## 2025-01-07 PROCEDURE — 1160F RVW MEDS BY RX/DR IN RCRD: CPT | Performed by: INTERNAL MEDICINE

## 2025-01-07 PROCEDURE — 1159F MED LIST DOCD IN RCRD: CPT | Performed by: INTERNAL MEDICINE

## 2025-01-07 RX ORDER — LISINOPRIL 10 MG/1
10 TABLET ORAL DAILY
Qty: 90 TABLET | Refills: 3 | Status: SHIPPED | OUTPATIENT
Start: 2025-01-07

## 2025-01-07 RX ORDER — HYDROCODONE BITARTRATE AND ACETAMINOPHEN 5; 325 MG/1; MG/1
1 TABLET ORAL 2 TIMES DAILY PRN
Qty: 60 TABLET | Refills: 0 | Status: SHIPPED | OUTPATIENT
Start: 2025-01-07 | End: 2025-02-06

## 2025-01-07 RX ORDER — SERTRALINE HYDROCHLORIDE 25 MG/1
25 TABLET, FILM COATED ORAL DAILY
Qty: 90 TABLET | Refills: 3 | Status: SHIPPED | OUTPATIENT
Start: 2025-01-07

## 2025-01-07 RX ORDER — TERAZOSIN 2 MG/1
2 CAPSULE ORAL NIGHTLY
Qty: 90 CAPSULE | Refills: 3 | Status: SHIPPED | OUTPATIENT
Start: 2025-01-07

## 2025-01-07 RX ORDER — OMEPRAZOLE 40 MG/1
40 CAPSULE, DELAYED RELEASE ORAL DAILY
Qty: 90 CAPSULE | Refills: 3 | Status: SHIPPED | OUTPATIENT
Start: 2025-01-07

## 2025-01-07 RX ORDER — ATORVASTATIN CALCIUM 20 MG/1
20 TABLET, FILM COATED ORAL DAILY
Qty: 90 TABLET | Refills: 3 | Status: SHIPPED | OUTPATIENT
Start: 2025-01-07

## 2025-01-07 RX ORDER — VERAPAMIL HYDROCHLORIDE 240 MG/1
240 TABLET, FILM COATED, EXTENDED RELEASE ORAL DAILY
Qty: 90 TABLET | Refills: 3 | Status: SHIPPED | OUTPATIENT
Start: 2025-01-07

## 2025-01-07 RX ORDER — LEVOTHYROXINE SODIUM 75 UG/1
75 TABLET ORAL DAILY
Qty: 90 TABLET | Refills: 3 | Status: SHIPPED | OUTPATIENT
Start: 2025-01-07

## 2025-01-07 RX ORDER — SERTRALINE HYDROCHLORIDE 100 MG/1
100 TABLET, FILM COATED ORAL DAILY
Qty: 90 TABLET | Refills: 3 | Status: SHIPPED | OUTPATIENT
Start: 2025-01-07

## 2025-01-07 ASSESSMENT — PATIENT HEALTH QUESTIONNAIRE - PHQ9
5. POOR APPETITE OR OVEREATING: NOT AT ALL
3. TROUBLE FALLING OR STAYING ASLEEP: NOT AT ALL
1. LITTLE INTEREST OR PLEASURE IN DOING THINGS: SEVERAL DAYS
SUM OF ALL RESPONSES TO PHQ QUESTIONS 1-9: 4
10. IF YOU CHECKED OFF ANY PROBLEMS, HOW DIFFICULT HAVE THESE PROBLEMS MADE IT FOR YOU TO DO YOUR WORK, TAKE CARE OF THINGS AT HOME, OR GET ALONG WITH OTHER PEOPLE: NOT DIFFICULT AT ALL
SUM OF ALL RESPONSES TO PHQ QUESTIONS 1-9: 4
9. THOUGHTS THAT YOU WOULD BE BETTER OFF DEAD, OR OF HURTING YOURSELF: NOT AT ALL
8. MOVING OR SPEAKING SO SLOWLY THAT OTHER PEOPLE COULD HAVE NOTICED. OR THE OPPOSITE, BEING SO FIGETY OR RESTLESS THAT YOU HAVE BEEN MOVING AROUND A LOT MORE THAN USUAL: NOT AT ALL
SUM OF ALL RESPONSES TO PHQ QUESTIONS 1-9: 4
6. FEELING BAD ABOUT YOURSELF - OR THAT YOU ARE A FAILURE OR HAVE LET YOURSELF OR YOUR FAMILY DOWN: NOT AT ALL
SUM OF ALL RESPONSES TO PHQ9 QUESTIONS 1 & 2: 2
2. FEELING DOWN, DEPRESSED OR HOPELESS: SEVERAL DAYS
SUM OF ALL RESPONSES TO PHQ QUESTIONS 1-9: 4
4. FEELING TIRED OR HAVING LITTLE ENERGY: MORE THAN HALF THE DAYS
7. TROUBLE CONCENTRATING ON THINGS, SUCH AS READING THE NEWSPAPER OR WATCHING TELEVISION: NOT AT ALL

## 2025-01-07 ASSESSMENT — ENCOUNTER SYMPTOMS
EYE PAIN: 0
BLOOD IN STOOL: 0
VOMITING: 0
SORE THROAT: 0
CONSTIPATION: 0
NAUSEA: 0
SHORTNESS OF BREATH: 0
RHINORRHEA: 0
DIARRHEA: 0
ABDOMINAL PAIN: 0
BACK PAIN: 1
COUGH: 1
CHEST TIGHTNESS: 0

## 2025-01-07 ASSESSMENT — LIFESTYLE VARIABLES
HOW OFTEN DO YOU HAVE A DRINK CONTAINING ALCOHOL: NEVER
HOW MANY STANDARD DRINKS CONTAINING ALCOHOL DO YOU HAVE ON A TYPICAL DAY: PATIENT DOES NOT DRINK

## 2025-01-07 NOTE — PROGRESS NOTES
Medicare Annual Wellness Visit    Briana Mosqueda is here for Medicare AWV    Assessment & Plan   Medicare annual wellness visit, subsequent         Health Maintenance   - immunizations:   Influenza Vaccination - ([id number]), (2024)  Pneumonia Vaccination - (12/2015) - prevnar, (12/2016) - pneumonoccal  Zoster/Shingles Vaccine (12/2023) #1, (3/2024) #2 - shingrix   Tetanus Vaccination - (12/15/2015), (1/1/2020) - tdap   covid (1/20/2021) #1, (2/17/2021) #2, (10/2021) #3  (04/07/2022) #4 - moderna,  (10/07/2022) - bivalent, (10/2023) - 23-24 moderna, (2024)   RSV (12/2023)     - Screenings:   Bone Density Scan - (2/28/2019)   Pelvic/Pap Exam  Mammogram - (8/14/2014), (4/2019) - neg     Colonoscopy - (2011) - neg per patient    Return for Medicare Annual Wellness Visit in 1 year.       Subjective       Patient's complete Health Risk Assessment and screening values have been reviewed and are found in Flowsheets. The following problems were reviewed today and where indicated follow up appointments were made and/or referrals ordered.    Positive Risk Factor Screenings with Interventions:    Fall Risk:  Do you feel unsteady or are you worried about falling? : (!) yes  2 or more falls in past year?: no  Fall with injury in past year?: no     Interventions:    Reviewed medications, home hazards, visual acuity, and co-morbidities that can increase risk for falls  Recommended Assisted Device  See AVS for additional education material         Controlled Medication Review:    Today's Pain Level: No data recorded   Opioid Risk: (Low risk score <55) Opioid risk score: 14    Patient is low risk for opioid use disorder or overdose.    Last PDMP [place] as Reviewed:  Review User Review Instant Review Result   JAVON EDMONDSON 1/7/2025 11:05 AM     Reviewed PDMP [1]     Last Controlled Substance Monitoring Documentation      Flowsheet Row Office Visit from 12/5/2024 in [facility]   Periodic Controlled Substance

## 2025-01-07 NOTE — PROGRESS NOTES
There is no guarding or rebound.   Musculoskeletal:      Cervical back: Normal range of motion and neck supple.      Lumbar back: Tenderness present. Decreased range of motion.   Lymphadenopathy:      Cervical: No cervical adenopathy.   Skin:     General: Skin is warm and dry.   Neurological:      Mental Status: She is alert and oriented to person, place, and time.      Cranial Nerves: No cranial nerve deficit.   Psychiatric:         Judgment: Judgment normal.         Assessment and Plan:    Diagnoses and all orders for this visit:    Gastroesophageal reflux disease without esophagitis  - watch diet  - on omeprazole 40mg   - states has been having worsening symptoms despite medications   - discussed switching PPI - declines 1/7/2025  - discussed referral to general surgery or GI - declines 1/7/2025  - improved 1/7/2025    Generalized osteoarthritis  - following with ortho  - asking for change in pain medications = asking for twice a day (3/2023), spoke to pain management regarding changes - increased amount to #60 (4/2023)   - advise follow up in 1 month (4/2023)   - disucssed poss switch to duloxetine (4/2023) - declines   - no changes 1/7/2025    [place] report reviewed (1/7/2025)   Controlled substance agreement updated (3/29/2021)  - urine pain management screen (10/2/2024)     Controlled Substance Monitoring:    Acute and Chronic Pain Monitoring:   RX Monitoring Periodic Controlled Substance Monitoring   1/7/2025  11:06 AM Possible medication side effects, risk of tolerance/dependence & alternative treatments discussed.;No signs of potential drug abuse or diversion identified.;Obtaining appropriate analgesic effect of treatment.       Chronic bilateral low back pain without sciatica  - uncertain as to cause - most prob OA   - home exercises   - discussed physical therapy - declines   - following with pain management   - continue norco as prescribed   - s/p median nerve branch block L4-5 and L5-S1, helped (9/2021)

## 2025-01-07 NOTE — PATIENT INSTRUCTIONS
group, you can talk to others who have dealt with the same problems or illness as you. You can encourage one another and learn ways to cope with tough emotions.  How can you find a support group?  Finding a support group that works for you may take time. There are many options. Some groups have a  who helps lead discussions or shares information. Others are less formal. Some meet in person, while others meet online.  Try using these resources to help you find the best support group for you.  Your doctor, health care team, or counselor.  People with the same health concern.  Your local Christianity, Anabaptism, Druze, or other Buddhist group.  A city, state, or national group that provides support for your health concern. Check your local library or community center for a list of these groups. Or look for information online.  Your local community, friends, and family.  Supportive relationships  A supportive relationship includes emotional support such as love, trust, and understanding, as well as advice and concrete help, such as help managing your time.  Reach out to others  Family and friends can help you. Ask them to:  Listen to you and give you encouragement. This can keep you from feeling hopeless or alone.  Help with small daily tasks or with bigger problems. A helping hand can keep you from feeling overwhelmed.  Help you manage a health problem. For example, ask them to go to doctor visits with you. Your loved ones can offer support by being involved in your medical care.  Respect your relationships  A good relationship is also a two-way street. You count on help from others, but they also count on you.  Know your friends' limits. You don't have to see or call your friends every day. If you are going through a rough patch, ask friends if you can contact them outside of the usual boundaries.  Don't always complain or talk about yourself. Know when it's time to stop talking and listen or just enjoy your

## 2025-02-04 DIAGNOSIS — M15.9 GENERALIZED OSTEOARTHRITIS: ICD-10-CM

## 2025-02-04 RX ORDER — HYDROCODONE BITARTRATE AND ACETAMINOPHEN 5; 325 MG/1; MG/1
1 TABLET ORAL 2 TIMES DAILY PRN
Qty: 60 TABLET | Refills: 0 | Status: SHIPPED | OUTPATIENT
Start: 2025-02-05 | End: 2025-03-07

## 2025-02-04 NOTE — TELEPHONE ENCOUNTER
Last Appointment:  1/7/2025  Future Appointments   Date Time Provider Department Center   4/29/2025  3:00 PM Randall Tran MD [facility] DEP

## 2025-03-07 DIAGNOSIS — M15.9 GENERALIZED OSTEOARTHRITIS: ICD-10-CM

## 2025-03-07 RX ORDER — HYDROCODONE BITARTRATE AND ACETAMINOPHEN 5; 325 MG/1; MG/1
1 TABLET ORAL 2 TIMES DAILY PRN
Qty: 60 TABLET | Refills: 0 | Status: SHIPPED | OUTPATIENT
Start: 2025-03-07 | End: 2025-04-06

## 2025-03-07 NOTE — TELEPHONE ENCOUNTER
Briana a new script for Hydrocodone sent to [facility] in [place].      I have this ready to send.      Last Appointment:  1/7/2025  Future Appointments   Date Time Provider Department Center   4/29/2025  3:00 PM Randall Tran MD [facility] DE[facility]

## 2025-03-27 ENCOUNTER — OFFICE VISIT (OUTPATIENT)
Dept: FAMILY MEDICINE CLINIC | Age: 89
End: 2025-03-27

## 2025-03-27 VITALS
OXYGEN SATURATION: 99 % | HEART RATE: 64 BPM | DIASTOLIC BLOOD PRESSURE: 64 MMHG | HEIGHT: 62 IN | TEMPERATURE: 98.4 F | WEIGHT: 156 LBS | BODY MASS INDEX: 28.71 KG/M2 | RESPIRATION RATE: 18 BRPM | SYSTOLIC BLOOD PRESSURE: 128 MMHG

## 2025-03-27 DIAGNOSIS — H61.23 BILATERAL IMPACTED CERUMEN: Primary | ICD-10-CM

## 2025-03-27 NOTE — PROGRESS NOTES
Chief Complaint       Ear Fullness (Left started 3/4 days ago)      History of Present Illness   Source of history provided by:  patient.      Briana Mosqueda is a 89 y.o. old female presenting to the walk in clinic for evaluation of difficulty hearing out of the left ear for the past 3 to 4 days.  Denies any associated pain or URI symptoms. Denies any discharge from the ear canal. Denies any fever, chills, CP, SOB, abdominal pain, neck stiffness, rash, or lethargy.     ROS    Unless otherwise stated in this report or unable to obtain because of the patient's clinical or mental status as evidenced by the medical record, this patients's positive and negative responses for Review of Systems, constitutional, psych, eyes, ENT, cardiovascular, respiratory, gastrointestinal, neurological, genitourinary, musculoskeletal, integument systems and systems related to the presenting problem are either stated in the preceding or were not pertinent or were negative for the symptoms and/or complaints related to the medical problem.    Past Medical History:  has a past medical history of Abnormal EKG, Anxiety, Arthritis, Benign neoplasm of thyroid gland, Depression, GERD (gastroesophageal reflux disease), Hyperlipemia, Hypertension, Impaired fasting glucose, Postoperative hypothyroidism, Presence of right artificial knee joint, and Thyroid disease.  Past Surgical History:  has a past surgical history that includes Hysterectomy; lumbar laminectomy (05/29/2013); Cholecystectomy; eye surgery; and pr arthrp kne condyle&platu medial&lat compartments (Right, 5/1/2018).  Social History:  reports that she has never smoked. She has never used smokeless tobacco. She reports that she does not drink alcohol and does not use drugs.  Family History: family history includes Cancer in her mother; Coronary Art Dis in her father; Heart Attack in her father.   Allergies: Patient has no known allergies.    Physical Exam         VS:  /64

## 2025-04-07 DIAGNOSIS — M15.9 GENERALIZED OSTEOARTHRITIS: ICD-10-CM

## 2025-04-07 RX ORDER — HYDROCODONE BITARTRATE AND ACETAMINOPHEN 5; 325 MG/1; MG/1
1 TABLET ORAL 2 TIMES DAILY PRN
Qty: 60 TABLET | Refills: 0 | Status: SHIPPED | OUTPATIENT
Start: 2025-04-07 | End: 2025-05-07

## 2025-04-07 NOTE — TELEPHONE ENCOUNTER
Requested Prescriptions     Pending Prescriptions Disp Refills    HYDROcodone-acetaminophen (NORCO) 5-325 MG per tablet 60 tablet 0     Sig: Take 1 tablet by mouth 2 times daily as needed for Pain for up to 30 days. Max Daily Amount: 2 tablets     Controlled Substance Monitoring:    Acute and Chronic Pain Monitoring:   RX Monitoring Periodic Controlled Substance Monitoring   4/7/2025   4:57 PM No signs of potential drug abuse or diversion identified.       I sent the Rx to the pharmacy.

## 2025-04-07 NOTE — TELEPHONE ENCOUNTER
Briana calling for a new script for [place] to be sent to [facility] in [place].      Last Appointment:  1/7/2025  Future Appointments   Date Time Provider Department Center   4/29/2025  3:00 PM Randall Tran MD [facility] DE[facility]

## 2025-04-29 ENCOUNTER — OFFICE VISIT (OUTPATIENT)
Dept: FAMILY MEDICINE CLINIC | Age: 89
End: 2025-04-29
Payer: MEDICARE

## 2025-04-29 VITALS
SYSTOLIC BLOOD PRESSURE: 134 MMHG | RESPIRATION RATE: 24 BRPM | HEART RATE: 75 BPM | BODY MASS INDEX: 28.34 KG/M2 | HEIGHT: 62 IN | WEIGHT: 154 LBS | DIASTOLIC BLOOD PRESSURE: 60 MMHG | OXYGEN SATURATION: 97 % | TEMPERATURE: 98.8 F

## 2025-04-29 DIAGNOSIS — I49.9 CARDIAC ARRHYTHMIA, UNSPECIFIED CARDIAC ARRHYTHMIA TYPE: ICD-10-CM

## 2025-04-29 DIAGNOSIS — E89.0 POSTOPERATIVE HYPOTHYROIDISM: ICD-10-CM

## 2025-04-29 DIAGNOSIS — M15.9 GENERALIZED OSTEOARTHRITIS: Primary | ICD-10-CM

## 2025-04-29 DIAGNOSIS — R73.01 IMPAIRED FASTING GLUCOSE: ICD-10-CM

## 2025-04-29 DIAGNOSIS — R05.3 CHRONIC COUGH: ICD-10-CM

## 2025-04-29 DIAGNOSIS — F11.20 OPIOID DEPENDENCE WITH CURRENT USE (HCC): ICD-10-CM

## 2025-04-29 DIAGNOSIS — R01.1 MURMUR, CARDIAC: ICD-10-CM

## 2025-04-29 DIAGNOSIS — F32.0 CURRENT MILD EPISODE OF MAJOR DEPRESSIVE DISORDER, UNSPECIFIED WHETHER RECURRENT: ICD-10-CM

## 2025-04-29 DIAGNOSIS — K21.9 GASTROESOPHAGEAL REFLUX DISEASE WITHOUT ESOPHAGITIS: ICD-10-CM

## 2025-04-29 DIAGNOSIS — I10 ESSENTIAL HYPERTENSION: ICD-10-CM

## 2025-04-29 DIAGNOSIS — Z79.899 LONG TERM CURRENT USE OF THERAPEUTIC DRUG: ICD-10-CM

## 2025-04-29 DIAGNOSIS — G89.29 CHRONIC BILATERAL LOW BACK PAIN WITHOUT SCIATICA: ICD-10-CM

## 2025-04-29 DIAGNOSIS — N18.31 STAGE 3A CHRONIC KIDNEY DISEASE (HCC): ICD-10-CM

## 2025-04-29 DIAGNOSIS — M54.50 CHRONIC BILATERAL LOW BACK PAIN WITHOUT SCIATICA: ICD-10-CM

## 2025-04-29 DIAGNOSIS — E78.2 MIXED HYPERLIPIDEMIA: ICD-10-CM

## 2025-04-29 PROBLEM — N18.32 STAGE 3B CHRONIC KIDNEY DISEASE (HCC): Status: RESOLVED | Noted: 2023-03-07 | Resolved: 2025-04-29

## 2025-04-29 PROCEDURE — 1159F MED LIST DOCD IN RCRD: CPT | Performed by: INTERNAL MEDICINE

## 2025-04-29 PROCEDURE — 99214 OFFICE O/P EST MOD 30 MIN: CPT | Performed by: INTERNAL MEDICINE

## 2025-04-29 PROCEDURE — 1123F ACP DISCUSS/DSCN MKR DOCD: CPT | Performed by: INTERNAL MEDICINE

## 2025-04-29 RX ORDER — FLUTICASONE PROPIONATE 50 MCG
1 SPRAY, SUSPENSION (ML) NASAL DAILY
Qty: 16 G | Refills: 2 | Status: SHIPPED | OUTPATIENT
Start: 2025-04-29

## 2025-04-29 RX ORDER — HYDROCODONE BITARTRATE AND ACETAMINOPHEN 5; 325 MG/1; MG/1
1 TABLET ORAL 2 TIMES DAILY PRN
Qty: 60 TABLET | Refills: 0 | Status: SHIPPED | OUTPATIENT
Start: 2025-05-05 | End: 2025-06-04

## 2025-04-29 SDOH — ECONOMIC STABILITY: FOOD INSECURITY: WITHIN THE PAST 12 MONTHS, THE FOOD YOU BOUGHT JUST DIDN'T LAST AND YOU DIDN'T HAVE MONEY TO GET MORE.: PATIENT DECLINED

## 2025-04-29 SDOH — ECONOMIC STABILITY: FOOD INSECURITY: WITHIN THE PAST 12 MONTHS, YOU WORRIED THAT YOUR FOOD WOULD RUN OUT BEFORE YOU GOT MONEY TO BUY MORE.: PATIENT DECLINED

## 2025-04-29 ASSESSMENT — ENCOUNTER SYMPTOMS
EYE PAIN: 0
COUGH: 1
BLOOD IN STOOL: 0
NAUSEA: 0
DIARRHEA: 0
SORE THROAT: 0
CONSTIPATION: 0
RHINORRHEA: 0
VOMITING: 0
SHORTNESS OF BREATH: 0
ABDOMINAL PAIN: 0
BACK PAIN: 1
CHEST TIGHTNESS: 0

## 2025-04-29 NOTE — PROGRESS NOTES
twice daily, Disp: 400 g, Rfl: 2    aspirin 81 MG tablet, Take 1 tablet by mouth daily, Disp: , Rfl:     Multiple Vitamins-Minerals (CENTRUM SILVER PO), Take 500 mg by mouth daily, Disp: , Rfl:     ascorbic acid (VITAMIN C) 500 MG tablet, Take 2 tablets by mouth daily, Disp: , Rfl:     Cholecalciferol (VITAMIN D3) 1000 units TABS, Take 1 tablet by mouth daily, Disp: , Rfl:     [START ON 5/5/2025] HYDROcodone-acetaminophen (NORCO) 5-325 MG per tablet, Take 1 tablet by mouth 2 times daily as needed for Pain for up to 30 days. 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    Depression     GERD (gastroesophageal reflux disease)     Hyperlipemia     Hypertension     Impaired fasting glucose 5/14/2019    Postoperative hypothyroidism 5/14/2019    Presence of right artificial knee joint 5/14/2019    Thyroid disease        Past Surgical History:   Procedure Laterality Date    CHOLECYSTECTOMY      EYE SURGERY      bilat cataract    HYSTERECTOMY (CERVIX STATUS UNKNOWN)      LUMBAR LAMINECTOMY  05/29/2013    L3-L4 with microdiscectomy    CT ARTHRP KNE CONDYLE&PLATU MEDIAL&LAT COMPARTMENTS Right 5/1/2018    RIGHT KNEE TOTAL ARTHROPLASTY (CHARLES)---PRP---PNB--- performed by Chau Adams MD at [facility] OR       Family History   Problem Relation Age of Onset    Cancer Mother         stomach    Heart Attack Father     Coronary Art Dis Father        Social History     Socioeconomic History    Marital status:      Spouse name: Not on file    Number of children: Not on file    Years of education: Not on file    Highest education level: Not on file   Occupational History    Not on file   Tobacco Use    Smoking status: Never    Smokeless tobacco: Never

## 2025-05-23 DIAGNOSIS — N18.31 STAGE 3A CHRONIC KIDNEY DISEASE (HCC): ICD-10-CM

## 2025-05-23 DIAGNOSIS — Z79.899 LONG TERM CURRENT USE OF THERAPEUTIC DRUG: ICD-10-CM

## 2025-05-23 DIAGNOSIS — E78.2 MIXED HYPERLIPIDEMIA: ICD-10-CM

## 2025-05-23 DIAGNOSIS — R73.01 IMPAIRED FASTING GLUCOSE: ICD-10-CM

## 2025-05-23 DIAGNOSIS — E89.0 POSTOPERATIVE HYPOTHYROIDISM: ICD-10-CM

## 2025-05-23 LAB
ALBUMIN: 4.2 G/DL (ref 3.5–5.2)
ALP BLD-CCNC: 97 U/L (ref 35–104)
ALT SERPL-CCNC: 11 U/L (ref 0–35)
ANION GAP SERPL CALCULATED.3IONS-SCNC: 11 MMOL/L (ref 7–16)
AST SERPL-CCNC: 19 U/L (ref 0–35)
BACTERIA: ABNORMAL
BASOPHILS ABSOLUTE: 0.04 K/UL (ref 0–0.2)
BASOPHILS RELATIVE PERCENT: 1 % (ref 0–2)
BILIRUB SERPL-MCNC: 0.9 MG/DL (ref 0–1.2)
BILIRUBIN, URINE: NEGATIVE
BUN BLDV-MCNC: 16 MG/DL (ref 8–23)
CALCIUM SERPL-MCNC: 9.4 MG/DL (ref 8.8–10.2)
CHLORIDE BLD-SCNC: 108 MMOL/L (ref 98–107)
CHOLESTEROL, FASTING: 162 MG/DL
CO2: 27 MMOL/L (ref 22–29)
COLOR, UA: YELLOW
CREAT SERPL-MCNC: 1.1 MG/DL (ref 0.5–1)
CREATININE URINE: 40.4 MG/DL (ref 29–226)
EOSINOPHILS ABSOLUTE: 0.03 K/UL (ref 0.05–0.5)
EOSINOPHILS RELATIVE PERCENT: 1 % (ref 0–6)
EPITHELIAL CELLS, UA: ABNORMAL /HPF
FOLATE: 14.8 NG/ML (ref 4.6–34.8)
GFR, ESTIMATED: 50 ML/MIN/1.73M2
GLUCOSE BLD-MCNC: 112 MG/DL (ref 74–99)
GLUCOSE URINE: NEGATIVE MG/DL
HBA1C MFR BLD: 5.5 % (ref 4–5.6)
HCT VFR BLD CALC: 40.3 % (ref 34–48)
HDLC SERPL-MCNC: 61 MG/DL
HEMOGLOBIN: 13.1 G/DL (ref 11.5–15.5)
IMMATURE GRANULOCYTES %: 0 % (ref 0–5)
IMMATURE GRANULOCYTES ABSOLUTE: <0.03 K/UL (ref 0–0.58)
KETONES, URINE: NEGATIVE MG/DL
LDL CHOLESTEROL: 82 MG/DL
LEUKOCYTE ESTERASE, URINE: ABNORMAL
LYMPHOCYTES ABSOLUTE: 1.07 K/UL (ref 1.5–4)
LYMPHOCYTES RELATIVE PERCENT: 17 % (ref 20–42)
MAGNESIUM: 1.9 MG/DL (ref 1.6–2.4)
MCH RBC QN AUTO: 31.7 PG (ref 26–35)
MCHC RBC AUTO-ENTMCNC: 32.5 G/DL (ref 32–34.5)
MCV RBC AUTO: 97.6 FL (ref 80–99.9)
MICROALBUMIN/CREAT 24H UR: <12 MG/L (ref 0–20)
MICROALBUMIN/CREAT UR-RTO: <30 MCG/MG CREAT (ref 0–30)
MONOCYTES ABSOLUTE: 0.46 K/UL (ref 0.1–0.95)
MONOCYTES RELATIVE PERCENT: 7 % (ref 2–12)
NEUTROPHILS ABSOLUTE: 4.64 K/UL (ref 1.8–7.3)
NEUTROPHILS RELATIVE PERCENT: 74 % (ref 43–80)
NITRITE, URINE: NEGATIVE
PDW BLD-RTO: 12.6 % (ref 11.5–15)
PH, URINE: 6.5 (ref 5–8)
PLATELET # BLD: 188 K/UL (ref 130–450)
PMV BLD AUTO: 11.4 FL (ref 7–12)
POTASSIUM SERPL-SCNC: 4.3 MMOL/L (ref 3.5–5.1)
PROTEIN UA: NEGATIVE MG/DL
RBC # BLD: 4.13 M/UL (ref 3.5–5.5)
RBC UA: ABNORMAL /HPF
SODIUM BLD-SCNC: 146 MMOL/L (ref 136–145)
SPECIFIC GRAVITY UA: 1.01 (ref 1–1.03)
TOTAL PROTEIN: 6.4 G/DL (ref 6.4–8.3)
TRIGLYCERIDE, FASTING: 97 MG/DL
TSH SERPL DL<=0.05 MIU/L-ACNC: 0.98 UIU/ML (ref 0.27–4.2)
TURBIDITY: CLEAR
URINE HGB: NEGATIVE
UROBILINOGEN, URINE: 0.2 EU/DL (ref 0–1)
VITAMIN B-12: >2000 PG/ML (ref 232–1245)
VITAMIN D 25-HYDROXY: 45.4 NG/ML (ref 30–100)
VLDLC SERPL CALC-MCNC: 19 MG/DL
WBC # BLD: 6.3 K/UL (ref 4.5–11.5)
WBC UA: ABNORMAL /HPF

## 2025-05-24 ENCOUNTER — TELEPHONE (OUTPATIENT)
Dept: PRIMARY CARE CLINIC | Age: 89
End: 2025-05-24

## 2025-05-24 NOTE — TELEPHONE ENCOUNTER
Please let the patient know that blood work results showed    Labs showed electrolyte abnormalities with slight increase in sodium and chloride levels.  Uncertain as to the cause of the results however could be related to medication and/or dehydration    Labs also showed slight kidney dysfunction which also could be related to medication and/or dehydration recommending avoiding NSAID type medications and increasing fluids    Other electrolytes including magnesium and liver functions were normal    Urine analysis showed slight white blood cells bacteria and skin cells.  These findings are overall nonspecific and the presence of skin cells could suggest slight contamination.  There was no evidence of microscopic protein    Blood counts were normal except for slight decrease in the lymphocyte count of uncertain cause or significance    Vitamin B-12 and folic acid levels were normal    Thyroid levels were normal    Cholesterol levels were fair    Hemoglobin A1c is a measure 3 blood sugar control was in normal range at 5.5.  No evidence for prediabetes or diabetes at present    Vitamin D levels were normal    Thanks

## 2025-06-02 ENCOUNTER — CLINICAL SUPPORT (OUTPATIENT)
Dept: FAMILY MEDICINE CLINIC | Age: 89
End: 2025-06-02
Payer: MEDICARE

## 2025-06-02 DIAGNOSIS — M15.9 GENERALIZED OSTEOARTHRITIS: ICD-10-CM

## 2025-06-02 PROCEDURE — 99212 OFFICE O/P EST SF 10 MIN: CPT | Performed by: INTERNAL MEDICINE

## 2025-06-02 RX ORDER — HYDROCODONE BITARTRATE AND ACETAMINOPHEN 5; 325 MG/1; MG/1
1 TABLET ORAL 2 TIMES DAILY PRN
Qty: 60 TABLET | Refills: 0 | Status: SHIPPED | OUTPATIENT
Start: 2025-06-02 | End: 2025-07-02

## 2025-06-02 NOTE — PROGRESS NOTES
Subjective:  Chief Complaint   Patient presents with    Medication Refill    Chronic Pain       Current Medications:  Current Outpatient Medications   Medication Sig Dispense Refill    HYDROcodone-acetaminophen (NORCO) 5-325 MG per tablet Take 1 tablet by mouth 2 times daily as needed for Pain for up to 30 days. Max Daily Amount: 2 tablets 60 tablet 0    fluticasone (FLONASE) 50 MCG/ACT nasal spray 1 spray by Each Nostril route daily 16 g 2    atorvastatin (LIPITOR) 20 MG tablet Take 1 tablet by mouth daily 90 tablet 3    levothyroxine (SYNTHROID) 75 MCG tablet Take 1 tablet by mouth Daily 90 tablet 3    lisinopril (PRINIVIL;ZESTRIL) 10 MG tablet Take 1 tablet by mouth daily 90 tablet 3    omeprazole (PRILOSEC) 40 MG delayed release capsule Take 1 capsule by mouth daily Instructed to take with sip water am of procedure 90 capsule 3    sertraline (ZOLOFT) 100 MG tablet Take 1 tablet by mouth daily 90 tablet 3    sertraline (ZOLOFT) 25 MG tablet Take 1 tablet by mouth daily 90 tablet 3    terazosin (HYTRIN) 2 MG capsule Take 1 capsule by mouth nightly 90 capsule 3    verapamil (CALAN SR) 240 MG extended release tablet Take 1 tablet by mouth daily 90 tablet 3    vitamin B-6 (PYRIDOXINE) 50 MG tablet Take 1 tablet by mouth daily      ibuprofen (ADVIL;MOTRIN) 800 MG tablet       vitamin B-12 (CYANOCOBALAMIN) 500 MCG tablet Take 1 tablet by mouth daily      ammonium lactate (LAC-HYDRIN) 12 % lotion Apply topically twice daily 400 g 2    aspirin 81 MG tablet Take 1 tablet by mouth daily      Multiple Vitamins-Minerals (CENTRUM SILVER PO) Take 500 mg by mouth daily      ascorbic acid (VITAMIN C) 500 MG tablet Take 2 tablets by mouth daily      Cholecalciferol (VITAMIN D3) 1000 units TABS Take 1 tablet by mouth daily       No current facility-administered medications for this visit.       Allergies:  No Known Allergies    Plan Per Assessment:  1. Generalized osteoarthritis  - following with ortho  - asking for change in pain

## 2025-07-01 ENCOUNTER — OFFICE VISIT (OUTPATIENT)
Dept: FAMILY MEDICINE CLINIC | Age: 89
End: 2025-07-01
Payer: MEDICARE

## 2025-07-01 VITALS
HEART RATE: 78 BPM | HEIGHT: 62 IN | SYSTOLIC BLOOD PRESSURE: 144 MMHG | DIASTOLIC BLOOD PRESSURE: 64 MMHG | WEIGHT: 155 LBS | BODY MASS INDEX: 28.52 KG/M2 | OXYGEN SATURATION: 96 % | TEMPERATURE: 98.6 F | RESPIRATION RATE: 20 BRPM

## 2025-07-01 DIAGNOSIS — I10 ESSENTIAL HYPERTENSION: ICD-10-CM

## 2025-07-01 DIAGNOSIS — K21.9 GASTROESOPHAGEAL REFLUX DISEASE WITHOUT ESOPHAGITIS: ICD-10-CM

## 2025-07-01 DIAGNOSIS — R05.3 CHRONIC COUGH: Primary | ICD-10-CM

## 2025-07-01 DIAGNOSIS — G89.29 CHRONIC BILATERAL LOW BACK PAIN WITHOUT SCIATICA: ICD-10-CM

## 2025-07-01 DIAGNOSIS — M15.9 GENERALIZED OSTEOARTHRITIS: ICD-10-CM

## 2025-07-01 DIAGNOSIS — M54.50 CHRONIC BILATERAL LOW BACK PAIN WITHOUT SCIATICA: ICD-10-CM

## 2025-07-01 PROCEDURE — 99213 OFFICE O/P EST LOW 20 MIN: CPT | Performed by: INTERNAL MEDICINE

## 2025-07-01 PROCEDURE — 1123F ACP DISCUSS/DSCN MKR DOCD: CPT | Performed by: INTERNAL MEDICINE

## 2025-07-01 PROCEDURE — 1160F RVW MEDS BY RX/DR IN RCRD: CPT | Performed by: INTERNAL MEDICINE

## 2025-07-01 PROCEDURE — 1159F MED LIST DOCD IN RCRD: CPT | Performed by: INTERNAL MEDICINE

## 2025-07-01 RX ORDER — HYDROCODONE BITARTRATE AND ACETAMINOPHEN 5; 325 MG/1; MG/1
1 TABLET ORAL 2 TIMES DAILY PRN
Qty: 60 TABLET | Refills: 0 | Status: SHIPPED | OUTPATIENT
Start: 2025-07-01 | End: 2025-07-31

## 2025-07-01 ASSESSMENT — ENCOUNTER SYMPTOMS
VOMITING: 0
SORE THROAT: 0
BACK PAIN: 1
SHORTNESS OF BREATH: 0
DIARRHEA: 0
BLOOD IN STOOL: 0
EYE PAIN: 0
ABDOMINAL PAIN: 0
RHINORRHEA: 0
CHEST TIGHTNESS: 0
NAUSEA: 0
COUGH: 1
CONSTIPATION: 0

## 2025-07-01 NOTE — PROGRESS NOTES
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 Now hypothyroid and on synthroid. No side effects reported. Administration instructions reviewed. Last Lab (10/2024) - stable tsh and free t4.     - States has urinary incontinence - stable with meds (terazosin). States urinary frequency. States stable.      - States has had right total knee arthoplasty for arthritis. Still having right Knee pain. States also has some back pain. States also right wrist pain. States following with ortho (Dr. Adams). States had compression stockings. States also using heating pad to back. States needs refill of meds. Takes Norco mostly at night - helping. No reported side effects or complications reported.     - states having left foot pain. States Has seen podiatry. Has bunion and hammer toes. Recommend pad. Still with discomfort.     - States has chronic rhinitis. Stable. States no cough. States nasal congestion. No sore throat. No chest pain, No fever or chills. No nausea or vomiting.     Health Maintenance   - immunizations:   Influenza Vaccination - ([id number]), (2024)  Pneumonia Vaccination - (12/2015) - prevnar, (12/2016) - pneumonoccal  Zoster/Shingles Vaccine (12/2023) #1, (3/2024) #2 - shingrix   Tetanus Vaccination - (12/15/2015), (1/1/2020) - tdap   covid (1/20/2021) #1, (2/17/2021) #2, (10/2021) #3  (04/07/2022) #4 - moderna,  (10/07/2022) - bivalent, (10/2023) - 23-24 moderna, (2024)   RSV (12/2023)     - Screenings:   Bone Density Scan - (2/28/2019)   Pelvic/Pap Exam  Mammogram - (8/14/2014), (4/2019) - neg     Colonoscopy - (2011) - neg per patient    Carotid Artery Study - (3/2016) - <50% b/l, also showed thyroid nodules,  (12/2019) - < 50% b/l     stress test (10/22) -perfusion imaging normal with attenuation artifact EKG did not change, low risk general pharmacologic stress test    ROS:  Review of Systems   Constitutional:  Positive for fatigue. Negative for appetite change, chills, fever and unexpected weight

## 2025-07-29 ENCOUNTER — OFFICE VISIT (OUTPATIENT)
Dept: FAMILY MEDICINE CLINIC | Age: 89
End: 2025-07-29
Payer: MEDICARE

## 2025-07-29 VITALS
HEIGHT: 62 IN | BODY MASS INDEX: 28.89 KG/M2 | SYSTOLIC BLOOD PRESSURE: 116 MMHG | WEIGHT: 157 LBS | OXYGEN SATURATION: 95 % | RESPIRATION RATE: 24 BRPM | TEMPERATURE: 99 F | DIASTOLIC BLOOD PRESSURE: 52 MMHG | HEART RATE: 88 BPM

## 2025-07-29 DIAGNOSIS — E78.2 MIXED HYPERLIPIDEMIA: ICD-10-CM

## 2025-07-29 DIAGNOSIS — K21.9 GASTROESOPHAGEAL REFLUX DISEASE WITHOUT ESOPHAGITIS: ICD-10-CM

## 2025-07-29 DIAGNOSIS — R01.1 MURMUR, CARDIAC: ICD-10-CM

## 2025-07-29 DIAGNOSIS — E89.0 POSTOPERATIVE HYPOTHYROIDISM: ICD-10-CM

## 2025-07-29 DIAGNOSIS — M54.50 CHRONIC BILATERAL LOW BACK PAIN WITHOUT SCIATICA: Primary | ICD-10-CM

## 2025-07-29 DIAGNOSIS — I10 ESSENTIAL HYPERTENSION: ICD-10-CM

## 2025-07-29 DIAGNOSIS — N18.31 STAGE 3A CHRONIC KIDNEY DISEASE (HCC): ICD-10-CM

## 2025-07-29 DIAGNOSIS — R73.01 IMPAIRED FASTING GLUCOSE: ICD-10-CM

## 2025-07-29 DIAGNOSIS — F11.20 OPIOID DEPENDENCE WITH CURRENT USE (HCC): ICD-10-CM

## 2025-07-29 DIAGNOSIS — F32.0 CURRENT MILD EPISODE OF MAJOR DEPRESSIVE DISORDER, UNSPECIFIED WHETHER RECURRENT: ICD-10-CM

## 2025-07-29 DIAGNOSIS — M15.9 GENERALIZED OSTEOARTHRITIS: ICD-10-CM

## 2025-07-29 DIAGNOSIS — I49.9 CARDIAC ARRHYTHMIA, UNSPECIFIED CARDIAC ARRHYTHMIA TYPE: ICD-10-CM

## 2025-07-29 DIAGNOSIS — G89.29 CHRONIC BILATERAL LOW BACK PAIN WITHOUT SCIATICA: Primary | ICD-10-CM

## 2025-07-29 PROCEDURE — 99213 OFFICE O/P EST LOW 20 MIN: CPT | Performed by: INTERNAL MEDICINE

## 2025-07-29 PROCEDURE — 1160F RVW MEDS BY RX/DR IN RCRD: CPT | Performed by: INTERNAL MEDICINE

## 2025-07-29 PROCEDURE — 1123F ACP DISCUSS/DSCN MKR DOCD: CPT | Performed by: INTERNAL MEDICINE

## 2025-07-29 PROCEDURE — 1159F MED LIST DOCD IN RCRD: CPT | Performed by: INTERNAL MEDICINE

## 2025-07-29 RX ORDER — HYDROCODONE BITARTRATE AND ACETAMINOPHEN 5; 325 MG/1; MG/1
1 TABLET ORAL 2 TIMES DAILY PRN
Qty: 60 TABLET | Refills: 0 | Status: SHIPPED | OUTPATIENT
Start: 2025-07-29 | End: 2025-08-28

## 2025-07-29 ASSESSMENT — ENCOUNTER SYMPTOMS
ABDOMINAL PAIN: 0
COUGH: 1
CONSTIPATION: 0
EYE PAIN: 0
NAUSEA: 0
DIARRHEA: 0
VOMITING: 0
RHINORRHEA: 0
CHEST TIGHTNESS: 0
BACK PAIN: 1
SORE THROAT: 0
BLOOD IN STOOL: 0
SHORTNESS OF BREATH: 0

## 2025-07-29 NOTE — PROGRESS NOTES
of Systems   Constitutional:  Positive for fatigue. Negative for appetite change, chills, fever and unexpected weight change.   HENT:  Negative for congestion, rhinorrhea and sore throat.    Eyes:  Negative for pain and visual disturbance.   Respiratory:  Positive for cough. Negative for chest tightness and shortness of breath.    Cardiovascular:  Negative for chest pain and palpitations.   Gastrointestinal:  Negative for abdominal pain, blood in stool, constipation, diarrhea, nausea and vomiting.   Genitourinary:  Negative for difficulty urinating, dysuria, frequency, pelvic pain, urgency and vaginal bleeding.   Musculoskeletal:  Positive for arthralgias and back pain. Negative for myalgias.   Skin:  Negative for rash.   Neurological:  Negative for dizziness, syncope, weakness, light-headedness, numbness and headaches.   Psychiatric/Behavioral:  Negative for suicidal ideas. The patient is not nervous/anxious.          Current Outpatient Medications:     HYDROcodone-acetaminophen (NORCO) 5-325 MG per tablet, Take 1 tablet by mouth 2 times daily as needed for Pain for up to 30 days. Max Daily Amount: 2 tablets, Disp: 60 tablet, Rfl: 0    fluticasone (FLONASE) 50 MCG/ACT nasal spray, 1 spray by Each Nostril route daily, Disp: 16 g, Rfl: 2    atorvastatin (LIPITOR) 20 MG tablet, Take 1 tablet by mouth daily, Disp: 90 tablet, Rfl: 3    levothyroxine (SYNTHROID) 75 MCG tablet, Take 1 tablet by mouth Daily, Disp: 90 tablet, Rfl: 3    lisinopril (PRINIVIL;ZESTRIL) 10 MG tablet, Take 1 tablet by mouth daily, Disp: 90 tablet, Rfl: 3    omeprazole (PRILOSEC) 40 MG delayed release capsule, Take 1 capsule by mouth daily Instructed to take with sip water am of procedure, Disp: 90 capsule, Rfl: 3    sertraline (ZOLOFT) 100 MG tablet, Take 1 tablet by mouth daily, Disp: 90 tablet, Rfl: 3    sertraline (ZOLOFT) 25 MG tablet, Take 1 tablet by mouth daily, Disp: 90 tablet, Rfl: 3    terazosin (HYTRIN) 2 MG capsule, Take 1 capsule by

## 2025-08-26 ENCOUNTER — OFFICE VISIT (OUTPATIENT)
Dept: FAMILY MEDICINE CLINIC | Age: 89
End: 2025-08-26

## 2025-08-26 VITALS
RESPIRATION RATE: 22 BRPM | HEART RATE: 59 BPM | HEIGHT: 62 IN | OXYGEN SATURATION: 96 % | TEMPERATURE: 97.8 F | DIASTOLIC BLOOD PRESSURE: 46 MMHG | BODY MASS INDEX: 28.71 KG/M2 | SYSTOLIC BLOOD PRESSURE: 102 MMHG | WEIGHT: 156 LBS

## 2025-08-26 DIAGNOSIS — I49.9 CARDIAC ARRHYTHMIA, UNSPECIFIED CARDIAC ARRHYTHMIA TYPE: ICD-10-CM

## 2025-08-26 DIAGNOSIS — M54.50 CHRONIC BILATERAL LOW BACK PAIN WITHOUT SCIATICA: Primary | ICD-10-CM

## 2025-08-26 DIAGNOSIS — N18.31 STAGE 3A CHRONIC KIDNEY DISEASE (HCC): ICD-10-CM

## 2025-08-26 DIAGNOSIS — M15.9 GENERALIZED OSTEOARTHRITIS: ICD-10-CM

## 2025-08-26 DIAGNOSIS — E78.2 MIXED HYPERLIPIDEMIA: ICD-10-CM

## 2025-08-26 DIAGNOSIS — I10 ESSENTIAL HYPERTENSION: ICD-10-CM

## 2025-08-26 DIAGNOSIS — G89.29 CHRONIC BILATERAL LOW BACK PAIN WITHOUT SCIATICA: Primary | ICD-10-CM

## 2025-08-26 RX ORDER — HYDROCODONE BITARTRATE AND ACETAMINOPHEN 5; 325 MG/1; MG/1
1 TABLET ORAL 2 TIMES DAILY PRN
Qty: 60 TABLET | Refills: 0 | Status: SHIPPED | OUTPATIENT
Start: 2025-08-26 | End: 2025-09-25

## 2025-08-26 ASSESSMENT — ENCOUNTER SYMPTOMS
RHINORRHEA: 0
VOMITING: 0
CHEST TIGHTNESS: 0
ABDOMINAL PAIN: 0
SHORTNESS OF BREATH: 0
EYE PAIN: 0
COUGH: 1
BLOOD IN STOOL: 0
SORE THROAT: 0
NAUSEA: 0
BACK PAIN: 1
DIARRHEA: 0
CONSTIPATION: 0

## (undated) DEVICE — GRADUATE

## (undated) DEVICE — BANDAGE COMPR W6INXL12FT SMOOTH FOR LIMB EXSANG ESMARCH

## (undated) DEVICE — STRIP,CLOSURE,WOUND,MEDI-STRIP,1/2X4: Brand: MEDLINE

## (undated) DEVICE — COVER HNDL LT DISP

## (undated) DEVICE — DRESSING PETRO W3XL8IN OIL EMUL N ADH GZ KNIT IMPREG CELOS

## (undated) DEVICE — PAD,ABDOMINAL,5"X9",ST,LF,25/BX: Brand: MEDLINE INDUSTRIES, INC.

## (undated) DEVICE — ELECTRODE PT RET AD L9FT HI MOIST COND ADH HYDRGEL CORDED

## (undated) DEVICE — BOWL AND CEMENT CARTRIDGE WITH BREAKAWAY FEMORAL NOZZLE: Brand: ACM

## (undated) DEVICE — BASIC SINGLE BASIN 1-LF: Brand: MEDLINE INDUSTRIES, INC.

## (undated) DEVICE — ZIMMER® STERILE DISPOSABLE TOURNIQUET CUFF WITH PLC, DUAL PORT, SINGLE BLADDER, 30 IN. (76 CM)

## (undated) DEVICE — Z INACTIVE USE 2660664 SOLUTION IRRIG 3000ML 0.9% SOD CHL USP UROMATIC PLAS CONT

## (undated) DEVICE — TUBE IRRIG HNDPC HI FLO TP INTRPULS W/SUCTION TUBE

## (undated) DEVICE — SOLUTION IV IRRIG WATER 1000ML POUR BRL 2F7114

## (undated) DEVICE — SOLUTION IV 500ML 0.9% SOD CHL PH 5 INJ USP VIAFLX PLAS

## (undated) DEVICE — STANDARD HYPODERMIC NEEDLE,POLYPROPYLENE HUB: Brand: MONOJECT

## (undated) DEVICE — BANDAGE COMPR W6INXL10YD ST M E WHITE/BEIGE

## (undated) DEVICE — TOWEL,OR,DSP,ST,BLUE,STD,6/PK,12PK/CS: Brand: MEDLINE

## (undated) DEVICE — STERILE PVP: Brand: MEDLINE INDUSTRIES, INC.

## (undated) DEVICE — SYRINGE MED 20ML STD CLR PLAS LUERLOCK TIP N CTRL DISP

## (undated) DEVICE — SET STRYKER GLUE GUN

## (undated) DEVICE — GOWN,SIRUS,FABRNF,XL,20/CS: Brand: MEDLINE

## (undated) DEVICE — GAUZE,SPONGE,4"X4",8PLY,STRL,LF,10/TRAY: Brand: MEDLINE

## (undated) DEVICE — STRYKER PERFORMANCE SERIES SAGITTAL BLADE: Brand: STRYKER PERFORMANCE SERIES

## (undated) DEVICE — SET EXTN L14IN 1ML IV L BOR NO FLTR NO STPCOCK

## (undated) DEVICE — APPLICATOR PREP 26ML 0.7% IOD POVACRYLEX 74% ISO ALC ST

## (undated) DEVICE — 1000 S-DRAPE TOWEL DRAPE 10/BX: Brand: STERI-DRAPE™

## (undated) DEVICE — SUTURE STRATAFIX SYMMETRIC SZ 1 L18IN ABSRB VLT CT1 L36CM SXPP1A404

## (undated) DEVICE — TOTAL KNEE PK

## (undated) DEVICE — TUBING, SUCTION, 9/32" X 10', STRAIGHT: Brand: MEDLINE

## (undated) DEVICE — 3M™ IOBAN™ 2 ANTIMICROBIAL INCISE DRAPE 6650EZ: Brand: IOBAN™ 2

## (undated) DEVICE — DRAPE,TOP,102X53,STERILE: Brand: MEDLINE

## (undated) DEVICE — BLADE SAW SAG 6 SYS 135X1.19X90MM

## (undated) DEVICE — 4-PORT MANIFOLD: Brand: NEPTUNE 2

## (undated) DEVICE — DOUBLE BASIN SET: Brand: MEDLINE INDUSTRIES, INC.

## (undated) DEVICE — KIT PLT RATIO DISPNS KT 2IN CANN TIP SPRY TIP DISP MAGELLAN

## (undated) DEVICE — TAPE ADH W3INXL10YD WHT COT WVN BK POWERFUL RUB BASE HIGHLY

## (undated) DEVICE — TOTAL TRAY, DB, 100% SILI FOLEY, 16FR 10: Brand: MEDLINE

## (undated) DEVICE — DRAPE,REIN 53X77,STERILE: Brand: MEDLINE

## (undated) DEVICE — ZIMMER® STERILE DISPOSABLE TOURNIQUET CUFF WITH PLC, DUAL PORT, SINGLE BLADDER, 42 IN. (107 CM)

## (undated) DEVICE — SPONGE LAP W18XL18IN WHT COT 4 PLY FLD STRUNG RADPQ DISP ST

## (undated) DEVICE — PADDING CAST W6INXL4YD COT LO LINTING WYTEX

## (undated) DEVICE — 2108 SERIES SAGITTAL BLADE FAN, OFFSET  (34.5 X 0.8 X 64.0MM)

## (undated) DEVICE — PACK PROCEDURE SURG GEN CUST